# Patient Record
Sex: FEMALE | Race: WHITE | Employment: OTHER | URBAN - METROPOLITAN AREA
[De-identification: names, ages, dates, MRNs, and addresses within clinical notes are randomized per-mention and may not be internally consistent; named-entity substitution may affect disease eponyms.]

---

## 2017-01-06 RX ORDER — CARBAMAZEPINE 100 MG/1
200 TABLET, EXTENDED RELEASE ORAL 2 TIMES DAILY
Qty: 135 TABLET | OUTPATIENT
Start: 2017-01-06

## 2017-01-12 ENCOUNTER — NURSE ONLY (OUTPATIENT)
Dept: FAMILY MEDICINE CLINIC | Age: 82
End: 2017-01-12

## 2017-01-12 ENCOUNTER — HOSPITAL ENCOUNTER (OUTPATIENT)
Dept: NON INVASIVE DIAGNOSTICS | Age: 82
Discharge: HOME | End: 2017-01-12
Attending: PSYCHIATRY & NEUROLOGY | Admitting: PSYCHIATRY & NEUROLOGY

## 2017-01-12 DIAGNOSIS — E53.8 B12 DEFICIENCY: Primary | ICD-10-CM

## 2017-01-12 LAB
CARBAMAZEPINE DOSE: NORMAL
CARBAMAZEPINE LEVEL: 6.8 UG/ML (ref 4–12)

## 2017-01-12 PROCEDURE — 96372 THER/PROPH/DIAG INJ SC/IM: CPT | Performed by: FAMILY MEDICINE

## 2017-01-12 RX ORDER — CYANOCOBALAMIN 1000 UG/ML
1000 INJECTION INTRAMUSCULAR; SUBCUTANEOUS ONCE
Status: COMPLETED | OUTPATIENT
Start: 2017-01-12 | End: 2017-01-12

## 2017-01-12 RX ADMIN — CYANOCOBALAMIN 1000 MCG: 1000 INJECTION INTRAMUSCULAR; SUBCUTANEOUS at 08:47

## 2017-01-14 RX ORDER — CARBAMAZEPINE 100 MG/1
300 TABLET, EXTENDED RELEASE ORAL 3 TIMES DAILY
Qty: 135 TABLET | OUTPATIENT
Start: 2017-01-14

## 2017-01-27 ENCOUNTER — TELEPHONE (OUTPATIENT)
Dept: FAMILY MEDICINE CLINIC | Age: 82
End: 2017-01-27

## 2017-01-27 ENCOUNTER — OFFICE VISIT (OUTPATIENT)
Dept: PAIN MANAGEMENT | Age: 82
End: 2017-01-27

## 2017-01-27 VITALS
WEIGHT: 200 LBS | HEART RATE: 65 BPM | SYSTOLIC BLOOD PRESSURE: 164 MMHG | BODY MASS INDEX: 32.14 KG/M2 | DIASTOLIC BLOOD PRESSURE: 72 MMHG

## 2017-01-27 DIAGNOSIS — M54.12 CERVICAL RADICULITIS: ICD-10-CM

## 2017-01-27 DIAGNOSIS — G89.4 CHRONIC PAIN SYNDROME: ICD-10-CM

## 2017-01-27 DIAGNOSIS — M47.812 CERVICAL SPONDYLOSIS WITHOUT MYELOPATHY: ICD-10-CM

## 2017-01-27 DIAGNOSIS — M50.30 DEGENERATION OF CERVICAL INTERVERTEBRAL DISC: ICD-10-CM

## 2017-01-27 DIAGNOSIS — M54.30 SCIATICA, UNSPECIFIED LATERALITY: ICD-10-CM

## 2017-01-27 PROCEDURE — 4040F PNEUMOC VAC/ADMIN/RCVD: CPT | Performed by: INTERNAL MEDICINE

## 2017-01-27 PROCEDURE — 1123F ACP DISCUSS/DSCN MKR DOCD: CPT | Performed by: INTERNAL MEDICINE

## 2017-01-27 PROCEDURE — G8484 FLU IMMUNIZE NO ADMIN: HCPCS | Performed by: INTERNAL MEDICINE

## 2017-01-27 PROCEDURE — 1036F TOBACCO NON-USER: CPT | Performed by: INTERNAL MEDICINE

## 2017-01-27 PROCEDURE — G8419 CALC BMI OUT NRM PARAM NOF/U: HCPCS | Performed by: INTERNAL MEDICINE

## 2017-01-27 PROCEDURE — 99213 OFFICE O/P EST LOW 20 MIN: CPT | Performed by: INTERNAL MEDICINE

## 2017-01-27 PROCEDURE — G8427 DOCREV CUR MEDS BY ELIG CLIN: HCPCS | Performed by: INTERNAL MEDICINE

## 2017-01-27 PROCEDURE — 1090F PRES/ABSN URINE INCON ASSESS: CPT | Performed by: INTERNAL MEDICINE

## 2017-01-27 PROCEDURE — G8399 PT W/DXA RESULTS DOCUMENT: HCPCS | Performed by: INTERNAL MEDICINE

## 2017-01-27 PROCEDURE — G8598 ASA/ANTIPLAT THER USED: HCPCS | Performed by: INTERNAL MEDICINE

## 2017-01-27 RX ORDER — PREGABALIN 50 MG/1
CAPSULE ORAL
Qty: 270 CAPSULE | Refills: 0 | Status: SHIPPED | OUTPATIENT
Start: 2017-01-27 | End: 2017-05-03 | Stop reason: SDUPTHER

## 2017-01-27 RX ORDER — OXYCODONE AND ACETAMINOPHEN 7.5; 325 MG/1; MG/1
1 TABLET ORAL EVERY 6 HOURS PRN
Qty: 90 TABLET | Refills: 0 | Status: SHIPPED | OUTPATIENT
Start: 2017-01-27 | End: 2017-02-14 | Stop reason: SDUPTHER

## 2017-02-14 ENCOUNTER — OFFICE VISIT (OUTPATIENT)
Dept: FAMILY MEDICINE CLINIC | Age: 82
End: 2017-02-14

## 2017-02-14 VITALS
DIASTOLIC BLOOD PRESSURE: 70 MMHG | WEIGHT: 198.4 LBS | TEMPERATURE: 98 F | HEIGHT: 66 IN | BODY MASS INDEX: 31.88 KG/M2 | SYSTOLIC BLOOD PRESSURE: 134 MMHG

## 2017-02-14 DIAGNOSIS — E53.8 B12 DEFICIENCY: ICD-10-CM

## 2017-02-14 DIAGNOSIS — F41.9 ANXIETY: Primary | ICD-10-CM

## 2017-02-14 DIAGNOSIS — L65.9 HAIR LOSS: ICD-10-CM

## 2017-02-14 LAB — TSH REFLEX: 2.07 UIU/ML (ref 0.27–4.2)

## 2017-02-14 PROCEDURE — 4040F PNEUMOC VAC/ADMIN/RCVD: CPT | Performed by: FAMILY MEDICINE

## 2017-02-14 PROCEDURE — G8484 FLU IMMUNIZE NO ADMIN: HCPCS | Performed by: FAMILY MEDICINE

## 2017-02-14 PROCEDURE — 96372 THER/PROPH/DIAG INJ SC/IM: CPT | Performed by: FAMILY MEDICINE

## 2017-02-14 PROCEDURE — 99214 OFFICE O/P EST MOD 30 MIN: CPT | Performed by: FAMILY MEDICINE

## 2017-02-14 PROCEDURE — G8598 ASA/ANTIPLAT THER USED: HCPCS | Performed by: FAMILY MEDICINE

## 2017-02-14 PROCEDURE — 1123F ACP DISCUSS/DSCN MKR DOCD: CPT | Performed by: FAMILY MEDICINE

## 2017-02-14 PROCEDURE — G8399 PT W/DXA RESULTS DOCUMENT: HCPCS | Performed by: FAMILY MEDICINE

## 2017-02-14 PROCEDURE — 1090F PRES/ABSN URINE INCON ASSESS: CPT | Performed by: FAMILY MEDICINE

## 2017-02-14 PROCEDURE — G8417 CALC BMI ABV UP PARAM F/U: HCPCS | Performed by: FAMILY MEDICINE

## 2017-02-14 PROCEDURE — G8428 CUR MEDS NOT DOCUMENT: HCPCS | Performed by: FAMILY MEDICINE

## 2017-02-14 PROCEDURE — 1036F TOBACCO NON-USER: CPT | Performed by: FAMILY MEDICINE

## 2017-02-14 RX ORDER — ALPRAZOLAM 1 MG/1
TABLET ORAL
Qty: 60 TABLET | Refills: 0 | Status: SHIPPED | OUTPATIENT
Start: 2017-02-14 | End: 2017-02-14 | Stop reason: SDUPTHER

## 2017-02-14 RX ORDER — CYANOCOBALAMIN 1000 UG/ML
1000 INJECTION INTRAMUSCULAR; SUBCUTANEOUS ONCE
Status: COMPLETED | OUTPATIENT
Start: 2017-02-14 | End: 2017-02-14

## 2017-02-14 RX ORDER — ALPRAZOLAM 1 MG/1
TABLET ORAL
Qty: 60 TABLET | Refills: 0 | Status: SHIPPED | OUTPATIENT
Start: 2017-02-14 | End: 2017-05-18 | Stop reason: SDUPTHER

## 2017-02-14 RX ADMIN — CYANOCOBALAMIN 1000 MCG: 1000 INJECTION INTRAMUSCULAR; SUBCUTANEOUS at 10:48

## 2017-02-15 ENCOUNTER — TELEPHONE (OUTPATIENT)
Dept: FAMILY MEDICINE CLINIC | Age: 82
End: 2017-02-15

## 2017-02-20 ASSESSMENT — ENCOUNTER SYMPTOMS
COLOR CHANGE: 0
ABDOMINAL PAIN: 0
SHORTNESS OF BREATH: 0

## 2017-03-14 DIAGNOSIS — F41.9 ANXIETY: ICD-10-CM

## 2017-03-14 RX ORDER — ALPRAZOLAM 1 MG/1
TABLET ORAL
Qty: 60 TABLET | Refills: 0 | OUTPATIENT
Start: 2017-03-14

## 2017-03-16 ENCOUNTER — NURSE ONLY (OUTPATIENT)
Dept: FAMILY MEDICINE CLINIC | Age: 82
End: 2017-03-16

## 2017-03-16 DIAGNOSIS — E53.8 B12 DEFICIENCY: Primary | ICD-10-CM

## 2017-03-16 PROCEDURE — 96372 THER/PROPH/DIAG INJ SC/IM: CPT | Performed by: FAMILY MEDICINE

## 2017-03-16 RX ORDER — CYANOCOBALAMIN 1000 UG/ML
1000 INJECTION INTRAMUSCULAR; SUBCUTANEOUS ONCE
Status: COMPLETED | OUTPATIENT
Start: 2017-03-16 | End: 2017-03-16

## 2017-03-16 RX ADMIN — CYANOCOBALAMIN 1000 MCG: 1000 INJECTION INTRAMUSCULAR; SUBCUTANEOUS at 09:53

## 2017-04-13 ENCOUNTER — NURSE ONLY (OUTPATIENT)
Dept: FAMILY MEDICINE CLINIC | Age: 82
End: 2017-04-13

## 2017-04-13 DIAGNOSIS — E53.8 VITAMIN B 12 DEFICIENCY: Primary | ICD-10-CM

## 2017-04-13 PROCEDURE — 96372 THER/PROPH/DIAG INJ SC/IM: CPT | Performed by: FAMILY MEDICINE

## 2017-04-13 RX ORDER — CYANOCOBALAMIN 1000 UG/ML
1000 INJECTION INTRAMUSCULAR; SUBCUTANEOUS ONCE
Status: COMPLETED | OUTPATIENT
Start: 2017-04-13 | End: 2017-04-13

## 2017-04-13 RX ADMIN — CYANOCOBALAMIN 1000 MCG: 1000 INJECTION INTRAMUSCULAR; SUBCUTANEOUS at 11:50

## 2017-05-03 ENCOUNTER — OFFICE VISIT (OUTPATIENT)
Dept: PAIN MANAGEMENT | Age: 82
End: 2017-05-03

## 2017-05-03 VITALS
HEART RATE: 77 BPM | BODY MASS INDEX: 31.66 KG/M2 | DIASTOLIC BLOOD PRESSURE: 50 MMHG | SYSTOLIC BLOOD PRESSURE: 120 MMHG | WEIGHT: 197 LBS

## 2017-05-03 DIAGNOSIS — M47.812 CERVICAL SPONDYLOSIS WITHOUT MYELOPATHY: ICD-10-CM

## 2017-05-03 DIAGNOSIS — M54.30 SCIATICA, UNSPECIFIED LATERALITY: ICD-10-CM

## 2017-05-03 DIAGNOSIS — M54.12 CERVICAL RADICULITIS: ICD-10-CM

## 2017-05-03 DIAGNOSIS — M50.30 DEGENERATION OF CERVICAL INTERVERTEBRAL DISC: ICD-10-CM

## 2017-05-03 DIAGNOSIS — G89.4 CHRONIC PAIN SYNDROME: ICD-10-CM

## 2017-05-03 PROCEDURE — G8427 DOCREV CUR MEDS BY ELIG CLIN: HCPCS | Performed by: INTERNAL MEDICINE

## 2017-05-03 PROCEDURE — G8417 CALC BMI ABV UP PARAM F/U: HCPCS | Performed by: INTERNAL MEDICINE

## 2017-05-03 PROCEDURE — G8598 ASA/ANTIPLAT THER USED: HCPCS | Performed by: INTERNAL MEDICINE

## 2017-05-03 PROCEDURE — G8399 PT W/DXA RESULTS DOCUMENT: HCPCS | Performed by: INTERNAL MEDICINE

## 2017-05-03 PROCEDURE — 4040F PNEUMOC VAC/ADMIN/RCVD: CPT | Performed by: INTERNAL MEDICINE

## 2017-05-03 PROCEDURE — 1090F PRES/ABSN URINE INCON ASSESS: CPT | Performed by: INTERNAL MEDICINE

## 2017-05-03 PROCEDURE — 99213 OFFICE O/P EST LOW 20 MIN: CPT | Performed by: INTERNAL MEDICINE

## 2017-05-03 PROCEDURE — 1036F TOBACCO NON-USER: CPT | Performed by: INTERNAL MEDICINE

## 2017-05-03 PROCEDURE — 1123F ACP DISCUSS/DSCN MKR DOCD: CPT | Performed by: INTERNAL MEDICINE

## 2017-05-03 RX ORDER — PREGABALIN 50 MG/1
CAPSULE ORAL
Qty: 270 CAPSULE | Refills: 0 | Status: SHIPPED | OUTPATIENT
Start: 2017-05-03 | End: 2017-08-09 | Stop reason: SDUPTHER

## 2017-05-03 RX ORDER — OXYCODONE AND ACETAMINOPHEN 7.5; 325 MG/1; MG/1
1 TABLET ORAL EVERY 6 HOURS PRN
Qty: 90 TABLET | Refills: 0 | Status: SHIPPED | OUTPATIENT
Start: 2017-05-03 | End: 2017-08-09 | Stop reason: SDUPTHER

## 2017-05-18 ENCOUNTER — OFFICE VISIT (OUTPATIENT)
Dept: FAMILY MEDICINE CLINIC | Age: 82
End: 2017-05-18

## 2017-05-18 VITALS
WEIGHT: 193 LBS | BODY MASS INDEX: 31.02 KG/M2 | SYSTOLIC BLOOD PRESSURE: 100 MMHG | DIASTOLIC BLOOD PRESSURE: 70 MMHG | TEMPERATURE: 99.2 F

## 2017-05-18 DIAGNOSIS — F41.9 ANXIETY: Primary | ICD-10-CM

## 2017-05-18 DIAGNOSIS — E53.8 B12 DEFICIENCY: ICD-10-CM

## 2017-05-18 PROCEDURE — 1036F TOBACCO NON-USER: CPT | Performed by: FAMILY MEDICINE

## 2017-05-18 PROCEDURE — 4040F PNEUMOC VAC/ADMIN/RCVD: CPT | Performed by: FAMILY MEDICINE

## 2017-05-18 PROCEDURE — 99213 OFFICE O/P EST LOW 20 MIN: CPT | Performed by: FAMILY MEDICINE

## 2017-05-18 PROCEDURE — G8417 CALC BMI ABV UP PARAM F/U: HCPCS | Performed by: FAMILY MEDICINE

## 2017-05-18 PROCEDURE — 1090F PRES/ABSN URINE INCON ASSESS: CPT | Performed by: FAMILY MEDICINE

## 2017-05-18 PROCEDURE — 1123F ACP DISCUSS/DSCN MKR DOCD: CPT | Performed by: FAMILY MEDICINE

## 2017-05-18 PROCEDURE — G8399 PT W/DXA RESULTS DOCUMENT: HCPCS | Performed by: FAMILY MEDICINE

## 2017-05-18 PROCEDURE — 96372 THER/PROPH/DIAG INJ SC/IM: CPT | Performed by: FAMILY MEDICINE

## 2017-05-18 PROCEDURE — G8598 ASA/ANTIPLAT THER USED: HCPCS | Performed by: FAMILY MEDICINE

## 2017-05-18 PROCEDURE — G8427 DOCREV CUR MEDS BY ELIG CLIN: HCPCS | Performed by: FAMILY MEDICINE

## 2017-05-18 RX ORDER — ALPRAZOLAM 1 MG/1
TABLET ORAL
Qty: 60 TABLET | Refills: 0 | Status: SHIPPED | OUTPATIENT
Start: 2017-05-18 | End: 2017-05-18 | Stop reason: SDUPTHER

## 2017-05-18 RX ORDER — CYANOCOBALAMIN 1000 UG/ML
1000 INJECTION INTRAMUSCULAR; SUBCUTANEOUS ONCE
Status: COMPLETED | OUTPATIENT
Start: 2017-05-18 | End: 2017-05-18

## 2017-05-18 RX ORDER — ALPRAZOLAM 1 MG/1
TABLET ORAL
Qty: 60 TABLET | Refills: 0 | Status: SHIPPED | OUTPATIENT
Start: 2017-05-18 | End: 2017-08-17 | Stop reason: SDUPTHER

## 2017-05-18 RX ADMIN — CYANOCOBALAMIN 1000 MCG: 1000 INJECTION INTRAMUSCULAR; SUBCUTANEOUS at 11:18

## 2017-06-14 ASSESSMENT — ENCOUNTER SYMPTOMS
ABDOMINAL PAIN: 0
SHORTNESS OF BREATH: 0

## 2017-06-20 ENCOUNTER — NURSE ONLY (OUTPATIENT)
Dept: FAMILY MEDICINE CLINIC | Age: 82
End: 2017-06-20

## 2017-06-20 DIAGNOSIS — E53.8 VITAMIN B 12 DEFICIENCY: Primary | ICD-10-CM

## 2017-06-20 PROCEDURE — 96372 THER/PROPH/DIAG INJ SC/IM: CPT | Performed by: FAMILY MEDICINE

## 2017-06-20 RX ORDER — CYANOCOBALAMIN 1000 UG/ML
1000 INJECTION INTRAMUSCULAR; SUBCUTANEOUS ONCE
Status: COMPLETED | OUTPATIENT
Start: 2017-06-20 | End: 2017-06-20

## 2017-06-20 RX ADMIN — CYANOCOBALAMIN 1000 MCG: 1000 INJECTION INTRAMUSCULAR; SUBCUTANEOUS at 10:43

## 2017-07-20 ENCOUNTER — NURSE ONLY (OUTPATIENT)
Dept: FAMILY MEDICINE CLINIC | Age: 82
End: 2017-07-20

## 2017-07-20 DIAGNOSIS — E53.8 B12 DEFICIENCY: Primary | ICD-10-CM

## 2017-07-20 PROCEDURE — 96372 THER/PROPH/DIAG INJ SC/IM: CPT | Performed by: FAMILY MEDICINE

## 2017-07-20 RX ORDER — CYANOCOBALAMIN 1000 UG/ML
1000 INJECTION INTRAMUSCULAR; SUBCUTANEOUS ONCE
Status: COMPLETED | OUTPATIENT
Start: 2017-07-20 | End: 2017-07-20

## 2017-07-20 RX ADMIN — CYANOCOBALAMIN 1000 MCG: 1000 INJECTION INTRAMUSCULAR; SUBCUTANEOUS at 10:44

## 2017-07-25 RX ORDER — LEVOTHYROXINE SODIUM 0.1 MG/1
TABLET ORAL
Qty: 90 TABLET | Refills: 0 | Status: SHIPPED | OUTPATIENT
Start: 2017-07-25 | End: 2018-01-27 | Stop reason: SDUPTHER

## 2017-08-09 ENCOUNTER — OFFICE VISIT (OUTPATIENT)
Dept: PAIN MANAGEMENT | Age: 82
End: 2017-08-09

## 2017-08-09 VITALS
DIASTOLIC BLOOD PRESSURE: 61 MMHG | BODY MASS INDEX: 31.34 KG/M2 | HEART RATE: 62 BPM | WEIGHT: 195 LBS | SYSTOLIC BLOOD PRESSURE: 136 MMHG

## 2017-08-09 DIAGNOSIS — M54.12 CERVICAL RADICULITIS: ICD-10-CM

## 2017-08-09 DIAGNOSIS — G89.4 CHRONIC PAIN SYNDROME: ICD-10-CM

## 2017-08-09 DIAGNOSIS — M54.30 SCIATICA, UNSPECIFIED LATERALITY: ICD-10-CM

## 2017-08-09 DIAGNOSIS — M50.30 DEGENERATION OF CERVICAL INTERVERTEBRAL DISC: ICD-10-CM

## 2017-08-09 DIAGNOSIS — M47.812 CERVICAL SPONDYLOSIS WITHOUT MYELOPATHY: ICD-10-CM

## 2017-08-09 PROCEDURE — G8399 PT W/DXA RESULTS DOCUMENT: HCPCS | Performed by: INTERNAL MEDICINE

## 2017-08-09 PROCEDURE — 1123F ACP DISCUSS/DSCN MKR DOCD: CPT | Performed by: INTERNAL MEDICINE

## 2017-08-09 PROCEDURE — 1090F PRES/ABSN URINE INCON ASSESS: CPT | Performed by: INTERNAL MEDICINE

## 2017-08-09 PROCEDURE — G8417 CALC BMI ABV UP PARAM F/U: HCPCS | Performed by: INTERNAL MEDICINE

## 2017-08-09 PROCEDURE — 4040F PNEUMOC VAC/ADMIN/RCVD: CPT | Performed by: INTERNAL MEDICINE

## 2017-08-09 PROCEDURE — 1036F TOBACCO NON-USER: CPT | Performed by: INTERNAL MEDICINE

## 2017-08-09 PROCEDURE — G8598 ASA/ANTIPLAT THER USED: HCPCS | Performed by: INTERNAL MEDICINE

## 2017-08-09 PROCEDURE — G8427 DOCREV CUR MEDS BY ELIG CLIN: HCPCS | Performed by: INTERNAL MEDICINE

## 2017-08-09 PROCEDURE — 99213 OFFICE O/P EST LOW 20 MIN: CPT | Performed by: INTERNAL MEDICINE

## 2017-08-09 RX ORDER — PREGABALIN 50 MG/1
CAPSULE ORAL
Qty: 270 CAPSULE | Refills: 0 | Status: SHIPPED | OUTPATIENT
Start: 2017-08-09 | End: 2017-11-10 | Stop reason: SDUPTHER

## 2017-08-09 RX ORDER — OXYCODONE AND ACETAMINOPHEN 7.5; 325 MG/1; MG/1
1 TABLET ORAL EVERY 6 HOURS PRN
Qty: 90 TABLET | Refills: 0 | Status: SHIPPED | OUTPATIENT
Start: 2017-08-09 | End: 2017-11-10 | Stop reason: SDUPTHER

## 2017-08-17 ENCOUNTER — OFFICE VISIT (OUTPATIENT)
Dept: FAMILY MEDICINE CLINIC | Age: 82
End: 2017-08-17

## 2017-08-17 VITALS
HEIGHT: 66 IN | SYSTOLIC BLOOD PRESSURE: 122 MMHG | TEMPERATURE: 98 F | BODY MASS INDEX: 31.66 KG/M2 | DIASTOLIC BLOOD PRESSURE: 84 MMHG | HEART RATE: 62 BPM | WEIGHT: 197 LBS

## 2017-08-17 DIAGNOSIS — F41.9 ANXIETY: ICD-10-CM

## 2017-08-17 DIAGNOSIS — E53.8 B12 DEFICIENCY: Primary | ICD-10-CM

## 2017-08-17 PROCEDURE — G8598 ASA/ANTIPLAT THER USED: HCPCS | Performed by: FAMILY MEDICINE

## 2017-08-17 PROCEDURE — 96372 THER/PROPH/DIAG INJ SC/IM: CPT | Performed by: FAMILY MEDICINE

## 2017-08-17 PROCEDURE — G8427 DOCREV CUR MEDS BY ELIG CLIN: HCPCS | Performed by: FAMILY MEDICINE

## 2017-08-17 PROCEDURE — 1090F PRES/ABSN URINE INCON ASSESS: CPT | Performed by: FAMILY MEDICINE

## 2017-08-17 PROCEDURE — G8399 PT W/DXA RESULTS DOCUMENT: HCPCS | Performed by: FAMILY MEDICINE

## 2017-08-17 PROCEDURE — G8417 CALC BMI ABV UP PARAM F/U: HCPCS | Performed by: FAMILY MEDICINE

## 2017-08-17 PROCEDURE — 1036F TOBACCO NON-USER: CPT | Performed by: FAMILY MEDICINE

## 2017-08-17 PROCEDURE — 1123F ACP DISCUSS/DSCN MKR DOCD: CPT | Performed by: FAMILY MEDICINE

## 2017-08-17 PROCEDURE — 4040F PNEUMOC VAC/ADMIN/RCVD: CPT | Performed by: FAMILY MEDICINE

## 2017-08-17 PROCEDURE — 99213 OFFICE O/P EST LOW 20 MIN: CPT | Performed by: FAMILY MEDICINE

## 2017-08-17 RX ORDER — ALPRAZOLAM 1 MG/1
TABLET ORAL
Qty: 60 TABLET | Refills: 0 | Status: SHIPPED | OUTPATIENT
Start: 2017-08-17 | End: 2017-08-17 | Stop reason: SDUPTHER

## 2017-08-17 RX ORDER — ALPRAZOLAM 1 MG/1
TABLET ORAL
Qty: 60 TABLET | Refills: 0 | Status: SHIPPED | OUTPATIENT
Start: 2017-08-17 | End: 2017-11-21 | Stop reason: SDUPTHER

## 2017-08-17 RX ORDER — CYANOCOBALAMIN 1000 UG/ML
1000 INJECTION INTRAMUSCULAR; SUBCUTANEOUS ONCE
Status: COMPLETED | OUTPATIENT
Start: 2017-08-17 | End: 2017-08-17

## 2017-08-17 RX ADMIN — CYANOCOBALAMIN 1000 MCG: 1000 INJECTION INTRAMUSCULAR; SUBCUTANEOUS at 11:30

## 2017-08-21 ENCOUNTER — TELEPHONE (OUTPATIENT)
Dept: FAMILY MEDICINE CLINIC | Age: 82
End: 2017-08-21

## 2017-08-21 DIAGNOSIS — M25.561 ACUTE PAIN OF RIGHT KNEE: Primary | ICD-10-CM

## 2017-08-22 ENCOUNTER — HOSPITAL ENCOUNTER (OUTPATIENT)
Dept: NON INVASIVE DIAGNOSTICS | Age: 82
Discharge: HOME | End: 2017-08-22
Attending: FAMILY MEDICINE | Admitting: FAMILY MEDICINE

## 2017-08-22 DIAGNOSIS — M25.561 ACUTE PAIN OF RIGHT KNEE: ICD-10-CM

## 2017-08-23 ENCOUNTER — TELEPHONE (OUTPATIENT)
Dept: FAMILY MEDICINE CLINIC | Age: 82
End: 2017-08-23

## 2017-08-23 NOTE — TELEPHONE ENCOUNTER
Wilber Green is calling for the xray results. Pl advise. [phone]    Wilber Green is aware that  Is out of the office and this message will be addressed when the Dr. Pio Pearsonred back into the office.

## 2017-08-28 ASSESSMENT — ENCOUNTER SYMPTOMS
SHORTNESS OF BREATH: 0
ABDOMINAL PAIN: 0

## 2017-08-29 ENCOUNTER — TELEPHONE (OUTPATIENT)
Dept: FAMILY MEDICINE CLINIC | Age: 82
End: 2017-08-29

## 2017-08-29 NOTE — TELEPHONE ENCOUNTER
Pt is needing a rx from Dr. Femi Galloway for a lift chair. Pt already called her insurance and they will pay for this. Pt is needing to know where she can get one at. Pl advise.    [phone]

## 2017-08-31 NOTE — TELEPHONE ENCOUNTER
A lift chair? I know nothing about ordering this. So, I have more questions:   Do they mean just a chair like a recliner that helps her to stand from a sit? Is there a specific brand of chair that they want? She has trouble standing because of her arthritis? Anything else that prevents her from getting up on her own? Thanks for the additional info.

## 2017-08-31 NOTE — TELEPHONE ENCOUNTER
Spoke with patient she states she wants the chair like a recliner brings her up to the standing position, she does not have any certain brand in mind. She states she has leg weakness, and has trouble pushing herself up.

## 2017-09-05 ENCOUNTER — TELEPHONE (OUTPATIENT)
Dept: FAMILY MEDICINE CLINIC | Age: 82
End: 2017-09-05

## 2017-09-28 ENCOUNTER — NURSE ONLY (OUTPATIENT)
Dept: FAMILY MEDICINE CLINIC | Age: 82
End: 2017-09-28

## 2017-09-28 DIAGNOSIS — Z23 NEED FOR INFLUENZA VACCINATION: Primary | ICD-10-CM

## 2017-09-28 DIAGNOSIS — E53.8 B12 DEFICIENCY: ICD-10-CM

## 2017-09-28 PROCEDURE — 96372 THER/PROPH/DIAG INJ SC/IM: CPT | Performed by: FAMILY MEDICINE

## 2017-09-28 PROCEDURE — 90662 IIV NO PRSV INCREASED AG IM: CPT | Performed by: FAMILY MEDICINE

## 2017-09-28 PROCEDURE — G0008 ADMIN INFLUENZA VIRUS VAC: HCPCS | Performed by: FAMILY MEDICINE

## 2017-09-28 RX ORDER — CYANOCOBALAMIN 1000 UG/ML
1000 INJECTION INTRAMUSCULAR; SUBCUTANEOUS ONCE
Status: COMPLETED | OUTPATIENT
Start: 2017-09-28 | End: 2017-09-28

## 2017-09-28 RX ADMIN — CYANOCOBALAMIN 1000 MCG: 1000 INJECTION INTRAMUSCULAR; SUBCUTANEOUS at 11:23

## 2017-10-10 NOTE — TELEPHONE ENCOUNTER
Call to dtjuan Hong at [phone] (cell). Adv recliner lift chair rx ready for . She requested we fax to her work office at [phone]. Fax completed.

## 2017-10-26 ENCOUNTER — NURSE ONLY (OUTPATIENT)
Dept: FAMILY MEDICINE CLINIC | Age: 82
End: 2017-10-26

## 2017-10-26 DIAGNOSIS — E53.8 VITAMIN B 12 DEFICIENCY: Primary | ICD-10-CM

## 2017-10-26 PROCEDURE — 96372 THER/PROPH/DIAG INJ SC/IM: CPT | Performed by: FAMILY MEDICINE

## 2017-10-26 RX ORDER — CYANOCOBALAMIN 1000 UG/ML
1000 INJECTION INTRAMUSCULAR; SUBCUTANEOUS ONCE
Status: COMPLETED | OUTPATIENT
Start: 2017-10-26 | End: 2017-10-26

## 2017-10-26 RX ADMIN — CYANOCOBALAMIN 1000 MCG: 1000 INJECTION INTRAMUSCULAR; SUBCUTANEOUS at 10:13

## 2017-11-10 ENCOUNTER — OFFICE VISIT (OUTPATIENT)
Dept: PAIN MANAGEMENT | Age: 82
End: 2017-11-10

## 2017-11-10 VITALS
DIASTOLIC BLOOD PRESSURE: 65 MMHG | BODY MASS INDEX: 31.8 KG/M2 | SYSTOLIC BLOOD PRESSURE: 148 MMHG | HEART RATE: 50 BPM | WEIGHT: 197 LBS

## 2017-11-10 DIAGNOSIS — M54.12 CERVICAL RADICULITIS: ICD-10-CM

## 2017-11-10 DIAGNOSIS — M47.812 CERVICAL SPONDYLOSIS WITHOUT MYELOPATHY: ICD-10-CM

## 2017-11-10 DIAGNOSIS — G89.4 CHRONIC PAIN SYNDROME: ICD-10-CM

## 2017-11-10 DIAGNOSIS — M54.30 SCIATICA, UNSPECIFIED LATERALITY: ICD-10-CM

## 2017-11-10 DIAGNOSIS — M50.30 DEGENERATION OF CERVICAL INTERVERTEBRAL DISC: ICD-10-CM

## 2017-11-10 DIAGNOSIS — M17.11 PRIMARY OSTEOARTHRITIS OF RIGHT KNEE: ICD-10-CM

## 2017-11-10 PROCEDURE — 1123F ACP DISCUSS/DSCN MKR DOCD: CPT | Performed by: INTERNAL MEDICINE

## 2017-11-10 PROCEDURE — G8484 FLU IMMUNIZE NO ADMIN: HCPCS | Performed by: INTERNAL MEDICINE

## 2017-11-10 PROCEDURE — 20610 DRAIN/INJ JOINT/BURSA W/O US: CPT | Performed by: INTERNAL MEDICINE

## 2017-11-10 PROCEDURE — G8417 CALC BMI ABV UP PARAM F/U: HCPCS | Performed by: INTERNAL MEDICINE

## 2017-11-10 PROCEDURE — G8598 ASA/ANTIPLAT THER USED: HCPCS | Performed by: INTERNAL MEDICINE

## 2017-11-10 PROCEDURE — G8399 PT W/DXA RESULTS DOCUMENT: HCPCS | Performed by: INTERNAL MEDICINE

## 2017-11-10 PROCEDURE — 4040F PNEUMOC VAC/ADMIN/RCVD: CPT | Performed by: INTERNAL MEDICINE

## 2017-11-10 PROCEDURE — 1090F PRES/ABSN URINE INCON ASSESS: CPT | Performed by: INTERNAL MEDICINE

## 2017-11-10 PROCEDURE — 99213 OFFICE O/P EST LOW 20 MIN: CPT | Performed by: INTERNAL MEDICINE

## 2017-11-10 PROCEDURE — 1036F TOBACCO NON-USER: CPT | Performed by: INTERNAL MEDICINE

## 2017-11-10 PROCEDURE — G8427 DOCREV CUR MEDS BY ELIG CLIN: HCPCS | Performed by: INTERNAL MEDICINE

## 2017-11-10 RX ORDER — PREGABALIN 50 MG/1
CAPSULE ORAL
Qty: 270 CAPSULE | Refills: 0 | Status: SHIPPED | OUTPATIENT
Start: 2017-11-10 | End: 2018-01-10 | Stop reason: SDUPTHER

## 2017-11-10 RX ORDER — OXYCODONE AND ACETAMINOPHEN 7.5; 325 MG/1; MG/1
1 TABLET ORAL EVERY 6 HOURS PRN
Qty: 90 TABLET | Refills: 0 | Status: SHIPPED | OUTPATIENT
Start: 2017-11-10 | End: 2018-01-10 | Stop reason: SDUPTHER

## 2017-11-10 RX ORDER — TRIAMCINOLONE ACETONIDE 40 MG/ML
40 INJECTION, SUSPENSION INTRA-ARTICULAR; INTRAMUSCULAR ONCE
Status: SHIPPED | OUTPATIENT
Start: 2017-11-10

## 2017-11-10 NOTE — PROGRESS NOTES
Subjective:      Patient ID: Shantanu Chris is a 81 y.o. female.     HPI    Review of Systems    Objective:   Physical Exam    Assessment:      ***      Plan:      ***

## 2017-11-10 NOTE — PROGRESS NOTES
[facility]  1935  [id number]    HISTORY OF PRESENT ILLNESS:  Ms. Sapna Gil is a 81 y.o. female returns for a follow up visit for multiple medical problems. Her current presenting problems are   1. Chronic pain syndrome    2. Cervical spondylosis without myelopathy    3. Degeneration of cervical intervertebral disc    4. Cervical radiculitis    5. Primary osteoarthritis of right knee    . As per information/history obtained from the PADT(patient assessment and documentation tool) - She complains of pain in the neck with radiation to the shoulders Bilateral and knees Right She rates the pain 7/10 and describes it as sharp, aching. Pain is made worse by: walking. Current treatment regimen has helped relieve about 50% of the pain. She denies side effects from the current pain regimen. Patient reports that since the last follow up visit the physical functioning is worse, family/social relationships are unchanged, mood is unchanged and sleep patterns are unchanged, and that the overall functioning is worse. Patient denies neurological bowel or bladder. Patient denies misusing/abusing her narcotic pain medications or using any illegal drugs. There are No indicators for possible drug abuse, addiction or diversion problems. Upon obtaining the medical history from Ms. Sapna Gil regarding today's office visit for her presenting problems, Patient states she has not been able to walk because of the right knee hurting. She states she cannot bear weight, has been using a walker for ambulation. She says she has been hurting in the knee. Ms. Sapna Gil mentions she has tried the topical NSAID'S, which has not been helping. Patient states her sleep is fair. Has fairly normal sleep latency. Averages about 4-6 hours of sleep a night. Denies any signs of sleep apnea. Feels somewhat rested in the morning.        ALLERGIES: Patients list of allergies were reviewed     MEDICATIONS: Ms. Sapna Gil list of medications were reviewed. Her current medications are   Outpatient Medications Prior to Visit   Medication Sig Dispense Refill    ALPRAZolam (XANAX) 1 MG tablet TAKE ONE TABLET BY MOUTH TWICE A DAY AS NEEDED FOR ANXIETY. NO ACOHOL. NO DRIVING. Do not take with percocet. 60 tablet 0    oxyCODONE-acetaminophen (PERCOCET) 7.5-325 MG per tablet Take 1 tablet by mouth every 6 hours as needed for Pain  Max 1-2 PER DAY. 90 tablet 0    pregabalin (LYRICA) 50 MG capsule 1 tab AM 2 tabs PM. 270 capsule 0    levothyroxine (SYNTHROID) 100 MCG tablet TAKE ONE TABLET BY MOUTH DAILY 90 tablet 0    carBAMazepine (TEGRETOL-XR) 200 MG extended release tablet Take 200 mg by mouth 3 times daily Indications: taking 800 mg daily, 300mg morning, 200mg afternoon, 300mg at night      atorvastatin (LIPITOR) 80 MG tablet Take 80 mg by mouth daily      amLODIPine (NORVASC) 5 MG tablet Take 5 mg by mouth daily      DULoxetine (CYMBALTA) 20 MG extended release capsule Take 20 mg by mouth daily       carBAMazepine (TEGRETOL-XR) 100 MG extended release tablet Take 1 tablet by mouth 2 times daily (Patient taking differently: Take 300 mg by mouth 3 times daily Indications: Taking 800 mg daily, 300mg, 200mg, 300mg ) 100 tablet 3    allopurinol (ZYLOPRIM) 300 MG tablet Take 300 mg by mouth      Respiratory Therapy Supplies (NEBULIZER COMPRESSOR) KIT 1 kit by Does not apply route once      Roflumilast (DALIRESP) 500 MCG tablet Take 500 mcg by mouth daily      theophylline (PILAR-24) 200 MG SR capsule Take 1 capsule by mouth daily 30 capsule 3    isosorbide mononitrate (IMDUR) 30 MG CR tablet Take 1 tablet by mouth daily. (Patient taking differently: Take 30 mg by mouth 2 times daily.) 90 tablet 0    nitroGLYCERIN (NITROSTAT) 0.4 MG SL tablet 1 SL q 5 min prn chest pain. After 3 tabs, if pain persists, go to ER.  25 tablet 0    azithromycin (ZITHROMAX) 500 MG tablet Take 500 mg by mouth. m-w-f      pantoprazole (PROTONIX) 40 MG tablet Take 40 mg by mouth daily.      metoprolol (LOPRESSOR) 25 MG tablet Take 50 mg by mouth 2 times daily.  PREDNISONE Take 10 mg by mouth daily at 1800       tiotropium (SPIRIVA) 18 MCG inhalation capsule Inhale 18 mcg into the lungs daily.  montelukast (SINGULAIR) 10 MG tablet Take 10 mg by mouth nightly.  psyllium (METAMUCIL SMOOTH TEXTURE) 28 % packet Take 1 packet by mouth 2 times daily. Take with full glass of h20 or juice       albuterol (PROVENTIL HFA;VENTOLIN HFA) 108 (90 BASE) MCG/ACT inhaler Inhale 2 puffs into the lungs every 6 hours as needed.  Potassium Chloride (KLOR-CON PO) Take 20 mEq by mouth daily       fluticasone-salmeterol (ADVAIR DISKUS) 250-50 MCG/DOSE AEPB Inhale 1 puff into the lungs 2 times daily.  clopidogrel (PLAVIX) 75 MG tablet Take 75 mg by mouth daily.  aspirin 81 MG EC tablet Take 81 mg by mouth daily. No facility-administered medications prior to visit. SOCIAL/FAMILY/PAST MEDICAL HISTORY: Ms. Smith Self, family and past medical history was reviewed. REVIEW OF SYSTEMS:    Respiratory: Negative for apnea, chest tightness and shortness of breath or change in baseline breathing. Gastrointestinal: Negative for nausea, vomiting, abdominal pain, diarrhea, constipation, blood in stool and abdominal distention. PHYSICAL EXAM:   Nursing note and vitals reviewed. BP (!) 148/65 (Site: Right Arm, Position: Sitting)   Pulse 50   Wt 197 lb (89.4 kg)   Breastfeeding? No   BMI 31.80 kg/m²   Constitutional: She appears well-developed and well-nourished. No acute distress. Skin: Skin is warm and dry, good turgor. No rash noted. She is not diaphoretic. Cardiovascular: Normal rate, regular rhythm, normal heart sounds, and does not have murmur. Pulmonary/Chest: Effort normal. No respiratory distress. She does not have wheezes in the lung fields. She has no rales. Neurological/Psychiatric:She is alert and oriented to person, place, and time. and in the presence of Dr. Eric Pavon.    11/10/17  4:24 PM  Irma Chappell MA   I, Dr. Eric Pavon, personally performed the services described in this documentation as scribed by   Tod Graham MA in my presence and it is both accurate and complete

## 2017-11-21 ENCOUNTER — OFFICE VISIT (OUTPATIENT)
Dept: FAMILY MEDICINE CLINIC | Age: 82
End: 2017-11-21

## 2017-11-21 VITALS
HEIGHT: 66 IN | SYSTOLIC BLOOD PRESSURE: 118 MMHG | DIASTOLIC BLOOD PRESSURE: 55 MMHG | BODY MASS INDEX: 30.89 KG/M2 | WEIGHT: 192.2 LBS | HEART RATE: 76 BPM | TEMPERATURE: 97.4 F

## 2017-11-21 DIAGNOSIS — E03.9 ACQUIRED HYPOTHYROIDISM: ICD-10-CM

## 2017-11-21 DIAGNOSIS — E03.9 ACQUIRED HYPOTHYROIDISM: Primary | ICD-10-CM

## 2017-11-21 DIAGNOSIS — M54.12 CERVICAL RADICULITIS: ICD-10-CM

## 2017-11-21 DIAGNOSIS — Z51.81 ENCOUNTER FOR THERAPEUTIC DRUG MONITORING: ICD-10-CM

## 2017-11-21 DIAGNOSIS — I63.9 CEREBROVASCULAR ACCIDENT (CVA), UNSPECIFIED MECHANISM (HCC): ICD-10-CM

## 2017-11-21 DIAGNOSIS — M10.472 GOUT DUE TO OTHER SECONDARY CAUSE INVOLVING TOE OF LEFT FOOT, UNSPECIFIED CHRONICITY: ICD-10-CM

## 2017-11-21 DIAGNOSIS — Z13.1 DIABETES MELLITUS SCREENING: ICD-10-CM

## 2017-11-21 DIAGNOSIS — E78.1 HYPERTRIGLYCERIDEMIA: ICD-10-CM

## 2017-11-21 DIAGNOSIS — M81.0 AGE-RELATED OSTEOPOROSIS WITHOUT CURRENT PATHOLOGICAL FRACTURE: ICD-10-CM

## 2017-11-21 DIAGNOSIS — F41.9 ANXIETY: ICD-10-CM

## 2017-11-21 LAB
A/G RATIO: 1.7 (ref 1.1–2.2)
ALBUMIN SERPL-MCNC: 3.9 G/DL (ref 3.4–5)
ALP BLD-CCNC: 156 U/L (ref 40–129)
ALT SERPL-CCNC: 13 U/L (ref 10–40)
ANION GAP SERPL CALCULATED.3IONS-SCNC: 15 MMOL/L (ref 3–16)
AST SERPL-CCNC: 15 U/L (ref 15–37)
BASOPHILS ABSOLUTE: 0 K/UL (ref 0–0.2)
BASOPHILS RELATIVE PERCENT: 0.3 %
BILIRUB SERPL-MCNC: 0.3 MG/DL (ref 0–1)
BUN BLDV-MCNC: 13 MG/DL (ref 7–20)
CALCIUM SERPL-MCNC: 8.4 MG/DL (ref 8.3–10.6)
CHLORIDE BLD-SCNC: 99 MMOL/L (ref 99–110)
CHOLESTEROL, TOTAL: 188 MG/DL (ref 0–199)
CO2: 30 MMOL/L (ref 21–32)
CREAT SERPL-MCNC: 0.9 MG/DL (ref 0.6–1.2)
EOSINOPHILS ABSOLUTE: 0.2 K/UL (ref 0–0.6)
EOSINOPHILS RELATIVE PERCENT: 2.1 %
FOLATE: 3.48 NG/ML (ref 4.78–24.2)
GFR AFRICAN AMERICAN: >60
GFR NON-AFRICAN AMERICAN: 60
GLOBULIN: 2.3 G/DL
GLUCOSE BLD-MCNC: 121 MG/DL (ref 70–99)
HCT VFR BLD CALC: 38.5 % (ref 36–48)
HDLC SERPL-MCNC: 46 MG/DL (ref 40–60)
HEMOGLOBIN: 12.9 G/DL (ref 12–16)
LDL CHOLESTEROL CALCULATED: ABNORMAL MG/DL
LDL CHOLESTEROL DIRECT: 60 MG/DL
LYMPHOCYTES ABSOLUTE: 1.9 K/UL (ref 1–5.1)
LYMPHOCYTES RELATIVE PERCENT: 19.6 %
MCH RBC QN AUTO: 33.8 PG (ref 26–34)
MCHC RBC AUTO-ENTMCNC: 33.5 G/DL (ref 31–36)
MCV RBC AUTO: 100.8 FL (ref 80–100)
MONOCYTES ABSOLUTE: 0.8 K/UL (ref 0–1.3)
MONOCYTES RELATIVE PERCENT: 8.5 %
NEUTROPHILS ABSOLUTE: 6.6 K/UL (ref 1.7–7.7)
NEUTROPHILS RELATIVE PERCENT: 69.5 %
PDW BLD-RTO: 13.3 % (ref 12.4–15.4)
PLATELET # BLD: 210 K/UL (ref 135–450)
PMV BLD AUTO: 9.6 FL (ref 5–10.5)
POTASSIUM SERPL-SCNC: 3.5 MMOL/L (ref 3.5–5.1)
RBC # BLD: 3.82 M/UL (ref 4–5.2)
SODIUM BLD-SCNC: 144 MMOL/L (ref 136–145)
TOTAL PROTEIN: 6.2 G/DL (ref 6.4–8.2)
TRIGL SERPL-MCNC: 472 MG/DL (ref 0–150)
TSH REFLEX: 1.51 UIU/ML (ref 0.27–4.2)
URIC ACID, SERUM: 3.5 MG/DL (ref 2.6–6)
VITAMIN B-12: >2000 PG/ML (ref 211–911)
VITAMIN D 25-HYDROXY: 11.8 NG/ML
VLDLC SERPL CALC-MCNC: ABNORMAL MG/DL
WBC # BLD: 9.6 K/UL (ref 4–11)

## 2017-11-21 PROCEDURE — G8484 FLU IMMUNIZE NO ADMIN: HCPCS | Performed by: FAMILY MEDICINE

## 2017-11-21 PROCEDURE — 1090F PRES/ABSN URINE INCON ASSESS: CPT | Performed by: FAMILY MEDICINE

## 2017-11-21 PROCEDURE — 1036F TOBACCO NON-USER: CPT | Performed by: FAMILY MEDICINE

## 2017-11-21 PROCEDURE — G8399 PT W/DXA RESULTS DOCUMENT: HCPCS | Performed by: FAMILY MEDICINE

## 2017-11-21 PROCEDURE — G8598 ASA/ANTIPLAT THER USED: HCPCS | Performed by: FAMILY MEDICINE

## 2017-11-21 PROCEDURE — 4040F PNEUMOC VAC/ADMIN/RCVD: CPT | Performed by: FAMILY MEDICINE

## 2017-11-21 PROCEDURE — 1123F ACP DISCUSS/DSCN MKR DOCD: CPT | Performed by: FAMILY MEDICINE

## 2017-11-21 PROCEDURE — G8427 DOCREV CUR MEDS BY ELIG CLIN: HCPCS | Performed by: FAMILY MEDICINE

## 2017-11-21 PROCEDURE — 99213 OFFICE O/P EST LOW 20 MIN: CPT | Performed by: FAMILY MEDICINE

## 2017-11-21 PROCEDURE — G8417 CALC BMI ABV UP PARAM F/U: HCPCS | Performed by: FAMILY MEDICINE

## 2017-11-21 PROCEDURE — 4005F PHARM THX FOR OP RXD: CPT | Performed by: FAMILY MEDICINE

## 2017-11-21 RX ORDER — ALPRAZOLAM 1 MG/1
TABLET ORAL
Qty: 60 TABLET | Refills: 0 | Status: SHIPPED | OUTPATIENT
Start: 2017-11-21 | End: 2017-11-21 | Stop reason: SDUPTHER

## 2017-11-21 RX ORDER — CYANOCOBALAMIN 1000 UG/ML
1000 INJECTION INTRAMUSCULAR; SUBCUTANEOUS ONCE
Status: SHIPPED | OUTPATIENT
Start: 2017-11-21

## 2017-11-21 RX ORDER — ALPRAZOLAM 1 MG/1
TABLET ORAL
Qty: 60 TABLET | Refills: 0 | Status: SHIPPED | OUTPATIENT
Start: 2017-11-21 | End: 2018-02-15 | Stop reason: SDUPTHER

## 2017-11-26 LAB
6-ACETYLMORPHINE: NOT DETECTED
7-AMINOCLONAZEPAM: NOT DETECTED
ALPHA-OH-ALPRAZOLAM: PRESENT
ALPRAZOLAM: PRESENT
AMPHETAMINE: NOT DETECTED
BARBITURATES: NOT DETECTED
BENZOYLECGONINE: NOT DETECTED
BUPRENORPHINE: NOT DETECTED
CARISOPRODOL: NOT DETECTED
CLONAZEPAM: NOT DETECTED
CODEINE: NOT DETECTED
CREATININE URINE: 184 MG/DL (ref 20–400)
DIAZEPAM: NOT DETECTED
DRUGS EXPECTED: NORMAL
EER PAIN MGT DRUG PANEL, HIGH RES/EMIT U: NORMAL
ETHYL GLUCURONIDE: NOT DETECTED
FENTANYL: NOT DETECTED
HYDROCODONE: NOT DETECTED
HYDROMORPHONE: NOT DETECTED
LORAZEPAM: NOT DETECTED
MARIJUANA METABOLITE: NOT DETECTED
MDA: NOT DETECTED
MDEA: NOT DETECTED
MDMA URINE: NOT DETECTED
MEPERIDINE: NOT DETECTED
METHADONE: NOT DETECTED
METHAMPHETAMINE: NOT DETECTED
METHYLPHENIDATE: NOT DETECTED
MIDAZOLAM: NOT DETECTED
MORPHINE: NOT DETECTED
NORBUPRENORPHINE, FREE: NOT DETECTED
NORDIAZEPAM: NOT DETECTED
NORFENTANYL: NOT DETECTED
NORHYDROCODONE, URINE: NOT DETECTED
NOROXYCODONE: PRESENT
NOROXYMORPHONE, URINE: NOT DETECTED
OXAZEPAM: NOT DETECTED
OXYCODONE: PRESENT
OXYMORPHONE: NOT DETECTED
PAIN MANAGEMENT DRUG PANEL: NORMAL
PAIN MANAGEMENT DRUG PANEL: NORMAL
PCP: NOT DETECTED
PHENTERMINE: NOT DETECTED
PROPOXYPHENE: NOT DETECTED
TAPENTADOL, URINE: NOT DETECTED
TAPENTADOL-O-SULFATE, URINE: NOT DETECTED
TEMAZEPAM: NOT DETECTED
TRAMADOL: NOT DETECTED
ZOLPIDEM: NOT DETECTED

## 2017-11-27 ENCOUNTER — TELEPHONE (OUTPATIENT)
Dept: FAMILY MEDICINE CLINIC | Age: 82
End: 2017-11-27

## 2017-11-27 DIAGNOSIS — Z13.21 ENCOUNTER FOR VITAMIN DEFICIENCY SCREENING: Primary | ICD-10-CM

## 2017-11-27 DIAGNOSIS — Z13.1 SCREENING FOR DIABETES MELLITUS: ICD-10-CM

## 2017-11-28 ASSESSMENT — ENCOUNTER SYMPTOMS
SHORTNESS OF BREATH: 0
ABDOMINAL PAIN: 0

## 2017-11-28 NOTE — PROGRESS NOTES
11/21/2017    Yoko Novoa is a 81 y.o. female    Chief Complaint   Patient presents with    Follow-up     3 month follow up meds,        SUBJECTIVE  HPI   Esther Gómez is here today for refill of meds. She actually feels great       Review of Systems   Constitutional: Negative for chills, fatigue and fever. Respiratory: Negative for shortness of breath. Cardiovascular: Negative for chest pain. Gastrointestinal: Negative for abdominal pain. Musculoskeletal: Negative for myalgias. OBJECTIVE  Physical Exam   Constitutional: She is oriented to person, place, and time. She appears well-developed and well-nourished. Neck: No thyromegaly present. Cardiovascular: Normal rate and regular rhythm. Pulmonary/Chest: Effort normal and breath sounds normal.   Musculoskeletal: She exhibits no edema. Neurological: She is alert and oriented to person, place, and time. Psychiatric: She has a normal mood and affect. Her behavior is normal.   Vitals reviewed. Allergies  Codeine    Current Outpatient Prescriptions   Medication Sig Dispense Refill    ALPRAZolam (XANAX) 1 MG tablet TAKE ONE TABLET BY MOUTH TWICE A DAY AS NEEDED FOR ANXIETY. NO ACOHOL. NO DRIVING. Do not take with percocet. . 60 tablet 0    oxyCODONE-acetaminophen (PERCOCET) 7.5-325 MG per tablet Take 1 tablet by mouth every 6 hours as needed for Pain  Max 2-3 PER DAY.  90 tablet 0    pregabalin (LYRICA) 50 MG capsule 1 tab AM 2 tabs PM.. 270 capsule 0    diclofenac sodium (VOLTAREN) 1 % GEL Apply 2-4 grams to right knee TID 5 Tube 2    levothyroxine (SYNTHROID) 100 MCG tablet TAKE ONE TABLET BY MOUTH DAILY 90 tablet 0    carBAMazepine (TEGRETOL-XR) 200 MG extended release tablet Take 200 mg by mouth 3 times daily Indications: taking 800 mg daily, 300mg morning, 200mg afternoon, 300mg at night      atorvastatin (LIPITOR) 80 MG tablet Take 80 mg by mouth daily      amLODIPine (NORVASC) 5 MG tablet Take 5 mg by mouth daily      DULoxetine (CYMBALTA) 20 MG extended release capsule Take 20 mg by mouth daily       carBAMazepine (TEGRETOL-XR) 100 MG extended release tablet Take 1 tablet by mouth 2 times daily (Patient taking differently: Take 300 mg by mouth 3 times daily Indications: Taking 800 mg daily, 300mg, 200mg, 300mg ) 100 tablet 3    allopurinol (ZYLOPRIM) 300 MG tablet Take 300 mg by mouth      Respiratory Therapy Supplies (NEBULIZER COMPRESSOR) KIT 1 kit by Does not apply route once      Roflumilast (DALIRESP) 500 MCG tablet Take 500 mcg by mouth daily      theophylline (PILAR-24) 200 MG SR capsule Take 1 capsule by mouth daily 30 capsule 3    isosorbide mononitrate (IMDUR) 30 MG CR tablet Take 1 tablet by mouth daily. (Patient taking differently: Take 30 mg by mouth 2 times daily.) 90 tablet 0    nitroGLYCERIN (NITROSTAT) 0.4 MG SL tablet 1 SL q 5 min prn chest pain. After 3 tabs, if pain persists, go to ER. 25 tablet 0    azithromycin (ZITHROMAX) 500 MG tablet Take 500 mg by mouth. m-w-f      pantoprazole (PROTONIX) 40 MG tablet Take 40 mg by mouth daily.  metoprolol (LOPRESSOR) 25 MG tablet Take 50 mg by mouth 2 times daily.  PREDNISONE Take 10 mg by mouth daily at 1800       tiotropium (SPIRIVA) 18 MCG inhalation capsule Inhale 18 mcg into the lungs daily.  montelukast (SINGULAIR) 10 MG tablet Take 10 mg by mouth nightly.  psyllium (METAMUCIL SMOOTH TEXTURE) 28 % packet Take 1 packet by mouth 2 times daily. Take with full glass of h20 or juice       albuterol (PROVENTIL HFA;VENTOLIN HFA) 108 (90 BASE) MCG/ACT inhaler Inhale 2 puffs into the lungs every 6 hours as needed.  Potassium Chloride (KLOR-CON PO) Take 20 mEq by mouth daily       fluticasone-salmeterol (ADVAIR DISKUS) 250-50 MCG/DOSE AEPB Inhale 1 puff into the lungs 2 times daily.  clopidogrel (PLAVIX) 75 MG tablet Take 75 mg by mouth daily.  aspirin 81 MG EC tablet Take 81 mg by mouth daily.        Current Facility-Administered Medications   Medication Dose Route Frequency Provider Last Rate Last Dose    cyanocobalamin injection 1,000 mcg  1,000 mcg Intramuscular Once Monica Spence DO        triamcinolone acetonide (KENALOG-40) injection 40 mg  40 mg Intramuscular Once Aidan Cohen MD         Vitals:    11/21/17 1039   BP: (!) 118/55   Pulse:    Temp:      Body mass index is 31.02 kg/m². uds done today    Controlled Substances Monitoring:     Attestation: The Prescription Monitoring Report for this patient was reviewed today. Monica Spence DO)  Documentation: No signs of potential drug abuse or diversion identified., Existing medication contract. ALEN Clark          ASSESSMENT  1. Acquired hypothyroidism  TSH with Reflex   2. Hypertriglyceridemia  Lipid Panel   3. Cerebrovascular accident (CVA), unspecified mechanism ([facility] [address]  CBC Auto Differential   4. Diabetes mellitus screening  Vitamin B12    COMPREHENSIVE METABOLIC PANEL    FOLATE   5. 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 Age-related osteoporosis without current pathological fracture  Vitamin D 25 Hydroxy   7. Anxiety  ALPRAZolam (XANAX) 1 MG tablet    DISCONTINUED: ALPRAZolam (XANAX) 1 MG tablet    DISCONTINUED: ALPRAZolam (XANAX) 1 MG tablet   8. Cervical radiculitis  cyanocobalamin injection 1,000 mcg   9. Encounter for therapeutic drug monitoring  Drug Panel-PM-HI Res-UR Interp-A       PLAN  Pt is stable on current meds. Continue with current meds. New meds this visit:    Orders Placed This Encounter   Medications    DISCONTD: ALPRAZolam (XANAX) 1 MG tablet     Sig: TAKE ONE TABLET BY MOUTH TWICE A DAY AS NEEDED FOR ANXIETY. NO ACOHOL. NO DRIVING. Do not take with percocet. .     Dispense:  60 tablet     Refill:  0     Do not refill before 11/22/17    DISCONTD: ALPRAZolam (XANAX) 1 MG tablet     Sig: TAKE ONE TABLET BY MOUTH TWICE A DAY AS NEEDED FOR ANXIETY. NO ACOHOL. NO DRIVING. Do not take with percocet. .     Dispense:  60 tablet     Refill:  0     Do not refill before 12/22/17    ALPRAZolam (XANAX) 1 MG tablet     Sig: TAKE ONE TABLET BY MOUTH TWICE A DAY AS NEEDED FOR ANXIETY. NO ACOHOL. NO DRIVING. Do not take with percocet. .     Dispense:  60 tablet     Refill:  0     Do not refill before 1/21/18    cyanocobalamin injection 1,000 mcg     New orders placed this visit:    Orders Placed This Encounter   Procedures    CBC Auto Differential     Standing Status:   Future     Number of Occurrences:   1     Standing Expiration Date:   11/21/2018    Lipid Panel     Standing Status:   Future     Number of Occurrences:   1     Standing Expiration Date:   11/21/2018     Order Specific Question:   Is Patient Fasting?/# of Hours     Answer:   12 hours    TSH with Reflex     Standing Status:   Future     Number of Occurrences:   1     Standing Expiration Date:   11/21/2018    Uric Acid     Standing Status:   Future     Number of Occurrences:   1     Standing Expiration Date:   11/21/2018    Vitamin D 25 Hydroxy     Standing Status:   Future     Number of Occurrences:   1     Standing Expiration Date:   11/21/2018    Vitamin B12     Standing Status:   Future     Number of Occurrences:   1     Standing Expiration Date:   11/21/2018    COMPREHENSIVE METABOLIC PANEL     Standing Status:   Future     Number of Occurrences:   1     Standing Expiration Date:   11/21/2018    FOLATE     Standing Status:   Future     Number of Occurrences:   1     Standing Expiration Date:   11/21/2018    Drug Panel-PM-HI Res-UR Interp-A     Current Outpatient Prescriptions:  ALPRAZolam (XANAX) 1 MG tablet, TAKE ONE TABLET BY MOUTH TWICE A DAY AS NEEDED FOR ANXIETY. NO ACOHOL. NO DRIVING. Do not take with percocet. ., Disp: 60 tablet, Rfl: 0  oxyCODONE-acetaminophen (PERCOCET) 7.5-325 MG per tablet, Take 1 tablet by mouth every 6 hours as needed for Pain  Max 2-3 PER DAY., Disp: 90 afternoon, 300mg at night      atorvastatin (LIPITOR) 80 MG tablet Take 80 mg by mouth daily      amLODIPine (NORVASC) 5 MG tablet Take 5 mg by mouth daily      DULoxetine (CYMBALTA) 20 MG extended release capsule Take 20 mg by mouth daily       carBAMazepine (TEGRETOL-XR) 100 MG extended release tablet Take 1 tablet by mouth 2 times daily (Patient taking differently: Take 300 mg by mouth 3 times daily Indications: Taking 800 mg daily, 300mg, 200mg, 300mg ) 100 tablet 3    allopurinol (ZYLOPRIM) 300 MG tablet Take 300 mg by mouth      Respiratory Therapy Supplies (NEBULIZER COMPRESSOR) KIT 1 kit by Does not apply route once      Roflumilast (DALIRESP) 500 MCG tablet Take 500 mcg by mouth daily      theophylline (PILAR-24) 200 MG SR capsule Take 1 capsule by mouth daily 30 capsule 3    isosorbide mononitrate (IMDUR) 30 MG CR tablet Take 1 tablet by mouth daily. (Patient taking differently: Take 30 mg by mouth 2 times daily.) 90 tablet 0    nitroGLYCERIN (NITROSTAT) 0.4 MG SL tablet 1 SL q 5 min prn chest pain. After 3 tabs, if pain persists, go to ER. 25 tablet 0    azithromycin (ZITHROMAX) 500 MG tablet Take 500 mg by mouth. m-w-f      pantoprazole (PROTONIX) 40 MG tablet Take 40 mg by mouth daily.  metoprolol (LOPRESSOR) 25 MG tablet Take 50 mg by mouth 2 times daily.  PREDNISONE Take 10 mg by mouth daily at 1800       tiotropium (SPIRIVA) 18 MCG inhalation capsule Inhale 18 mcg into the lungs daily.  montelukast (SINGULAIR) 10 MG tablet Take 10 mg by mouth nightly.  psyllium (METAMUCIL SMOOTH TEXTURE) 28 % packet Take 1 packet by mouth 2 times daily. Take with full glass of h20 or juice       albuterol (PROVENTIL HFA;VENTOLIN HFA) 108 (90 BASE) MCG/ACT inhaler Inhale 2 puffs into the lungs every 6 hours as needed.       Potassium Chloride (KLOR-CON PO) Take 20 mEq by mouth daily       fluticasone-salmeterol (ADVAIR DISKUS) 250-50 MCG/DOSE AEPB Inhale 1 puff into the lungs 2 times

## 2017-11-30 RX ORDER — ERGOCALCIFEROL (VITAMIN D2) 1250 MCG
50000 CAPSULE ORAL WEEKLY
Qty: 4 CAPSULE | Refills: 3 | Status: SHIPPED | OUTPATIENT
Start: 2017-11-30 | End: 2018-02-15 | Stop reason: SDUPTHER

## 2018-01-02 RX ORDER — ERGOCALCIFEROL (VITAMIN D2) 1250 MCG
50000 CAPSULE ORAL WEEKLY
Qty: 12 CAPSULE | Refills: 1 | Status: CANCELLED | OUTPATIENT
Start: 2018-01-02

## 2018-01-10 ENCOUNTER — OFFICE VISIT (OUTPATIENT)
Dept: PAIN MANAGEMENT | Age: 83
End: 2018-01-10

## 2018-01-10 VITALS
BODY MASS INDEX: 30.99 KG/M2 | DIASTOLIC BLOOD PRESSURE: 98 MMHG | SYSTOLIC BLOOD PRESSURE: 129 MMHG | WEIGHT: 192 LBS | HEART RATE: 61 BPM

## 2018-01-10 DIAGNOSIS — M50.30 DEGENERATION OF CERVICAL INTERVERTEBRAL DISC: ICD-10-CM

## 2018-01-10 DIAGNOSIS — M47.812 CERVICAL SPONDYLOSIS WITHOUT MYELOPATHY: ICD-10-CM

## 2018-01-10 DIAGNOSIS — G89.4 CHRONIC PAIN SYNDROME: ICD-10-CM

## 2018-01-10 DIAGNOSIS — M17.11 PRIMARY OSTEOARTHRITIS OF RIGHT KNEE: ICD-10-CM

## 2018-01-10 DIAGNOSIS — M54.12 CERVICAL RADICULITIS: ICD-10-CM

## 2018-01-10 PROCEDURE — G8598 ASA/ANTIPLAT THER USED: HCPCS | Performed by: INTERNAL MEDICINE

## 2018-01-10 PROCEDURE — G8484 FLU IMMUNIZE NO ADMIN: HCPCS | Performed by: INTERNAL MEDICINE

## 2018-01-10 PROCEDURE — 1036F TOBACCO NON-USER: CPT | Performed by: INTERNAL MEDICINE

## 2018-01-10 PROCEDURE — 1090F PRES/ABSN URINE INCON ASSESS: CPT | Performed by: INTERNAL MEDICINE

## 2018-01-10 PROCEDURE — G8417 CALC BMI ABV UP PARAM F/U: HCPCS | Performed by: INTERNAL MEDICINE

## 2018-01-10 PROCEDURE — G8399 PT W/DXA RESULTS DOCUMENT: HCPCS | Performed by: INTERNAL MEDICINE

## 2018-01-10 PROCEDURE — G8427 DOCREV CUR MEDS BY ELIG CLIN: HCPCS | Performed by: INTERNAL MEDICINE

## 2018-01-10 PROCEDURE — 1123F ACP DISCUSS/DSCN MKR DOCD: CPT | Performed by: INTERNAL MEDICINE

## 2018-01-10 PROCEDURE — 4040F PNEUMOC VAC/ADMIN/RCVD: CPT | Performed by: INTERNAL MEDICINE

## 2018-01-10 PROCEDURE — 99213 OFFICE O/P EST LOW 20 MIN: CPT | Performed by: INTERNAL MEDICINE

## 2018-01-10 RX ORDER — OXYCODONE AND ACETAMINOPHEN 7.5; 325 MG/1; MG/1
1 TABLET ORAL EVERY 6 HOURS PRN
Qty: 90 TABLET | Refills: 0 | Status: SHIPPED | OUTPATIENT
Start: 2018-01-10 | End: 2018-03-07 | Stop reason: SDUPTHER

## 2018-01-10 RX ORDER — PREGABALIN 50 MG/1
CAPSULE ORAL
Qty: 270 CAPSULE | Refills: 1 | Status: SHIPPED | OUTPATIENT
Start: 2018-01-10 | End: 2018-03-07 | Stop reason: SDUPTHER

## 2018-01-10 NOTE — PROGRESS NOTES
mouth once a week 4 capsule 3    ALPRAZolam (XANAX) 1 MG tablet TAKE ONE TABLET BY MOUTH TWICE A DAY AS NEEDED FOR ANXIETY. NO ACOHOL. NO DRIVING. Do not take with percocet. . 60 tablet 0    oxyCODONE-acetaminophen (PERCOCET) 7.5-325 MG per tablet Take 1 tablet by mouth every 6 hours as needed for Pain  Max 2-3 PER DAY. 90 tablet 0    pregabalin (LYRICA) 50 MG capsule 1 tab AM 2 tabs PM.. 270 capsule 0    diclofenac sodium (VOLTAREN) 1 % GEL Apply 2-4 grams to right knee TID 5 Tube 2    levothyroxine (SYNTHROID) 100 MCG tablet TAKE ONE TABLET BY MOUTH DAILY 90 tablet 0    carBAMazepine (TEGRETOL-XR) 200 MG extended release tablet Take 200 mg by mouth 3 times daily Indications: taking 800 mg daily, 300mg morning, 200mg afternoon, 300mg at night      atorvastatin (LIPITOR) 80 MG tablet Take 80 mg by mouth daily      amLODIPine (NORVASC) 5 MG tablet Take 5 mg by mouth daily      DULoxetine (CYMBALTA) 20 MG extended release capsule Take 20 mg by mouth daily       carBAMazepine (TEGRETOL-XR) 100 MG extended release tablet Take 1 tablet by mouth 2 times daily (Patient taking differently: Take 300 mg by mouth 3 times daily Indications: Taking 800 mg daily, 300mg, 200mg, 300mg ) 100 tablet 3    allopurinol (ZYLOPRIM) 300 MG tablet Take 300 mg by mouth      Respiratory Therapy Supplies (NEBULIZER COMPRESSOR) KIT 1 kit by Does not apply route once      Roflumilast (DALIRESP) 500 MCG tablet Take 500 mcg by mouth daily      theophylline (PILAR-24) 200 MG SR capsule Take 1 capsule by mouth daily 30 capsule 3    isosorbide mononitrate (IMDUR) 30 MG CR tablet Take 1 tablet by mouth daily. (Patient taking differently: Take 30 mg by mouth 2 times daily.) 90 tablet 0    nitroGLYCERIN (NITROSTAT) 0.4 MG SL tablet 1 SL q 5 min prn chest pain. After 3 tabs, if pain persists, go to ER.  25 tablet 0    azithromycin (ZITHROMAX) 500 MG tablet Take 500 mg by mouth. m-w-f      pantoprazole (PROTONIX) 40 MG tablet Take 40 mg by BASE) MCG/ACT inhaler Inhale 2 puffs into the lungs every 6 hours as needed.  Potassium Chloride (KLOR-CON PO) Take 20 mEq by mouth daily       fluticasone-salmeterol (ADVAIR DISKUS) 250-50 MCG/DOSE AEPB Inhale 1 puff into the lungs 2 times daily.  clopidogrel (PLAVIX) 75 MG tablet Take 75 mg by mouth daily.  aspirin 81 MG EC tablet Take 81 mg by mouth daily. Current Facility-Administered Medications   Medication Dose Route Frequency Provider Last Rate Last Dose    cyanocobalamin injection 1,000 mcg  1,000 mcg Intramuscular Once Reda Damascus, DO        triamcinolone acetonide (KENALOG-40) injection 40 mg  40 mg Intramuscular Once Karyle Prosper, MD         I will continue her current medication regimen  which is part of the above treatment schedule. It has been helping with Ms. Inman's chronic  medical problems which for this visit include:   Diagnoses of Chronic pain syndrome, Cervical spondylosis without myelopathy, Degeneration of cervical intervertebral disc, Cervical radiculitis, Primary osteoarthritis of right knee, Sciatica, unspecified laterality, and Anxiety were pertinent to this visit. Risks and benefits of the medications and other alternative treatments  including no treatment were discussed with the patient. The common side effects of these medications were also explained to the patient. Informed verbal consent was obtained. Goals of current treatment regimen include improvement in pain, restoration of functioning- with focus on improvement in physical performance, general activity, work or disability,emotional distress, health care utilization and  decreased medication consumption. Will continue to monitor progress towards achieving/maintaining therapeutic goals with special emphasis on  1. Improvement in perceived interfernce  of pain with ADL's. Ability to do home exercises independently.  Ability to do household chores indoor and/or outdoor work and social and

## 2018-02-08 ENCOUNTER — TELEPHONE (OUTPATIENT)
Dept: PHARMACY | Facility: CLINIC | Age: 83
End: 2018-02-08

## 2018-02-09 ENCOUNTER — TELEPHONE (OUTPATIENT)
Dept: FAMILY MEDICINE CLINIC | Age: 83
End: 2018-02-09

## 2018-02-09 DIAGNOSIS — R73.9 HYPERGLYCEMIA: ICD-10-CM

## 2018-02-09 DIAGNOSIS — E78.1 HYPERTRIGLYCERIDEMIA: Primary | ICD-10-CM

## 2018-02-13 ENCOUNTER — TELEPHONE (OUTPATIENT)
Dept: FAMILY MEDICINE CLINIC | Age: 83
End: 2018-02-13

## 2018-02-13 DIAGNOSIS — Z00.00 ROUTINE GENERAL MEDICAL EXAMINATION AT A HEALTH CARE FACILITY: Primary | ICD-10-CM

## 2018-02-13 DIAGNOSIS — E78.1 HYPERTRIGLYCERIDEMIA: ICD-10-CM

## 2018-02-13 DIAGNOSIS — Z00.00 ROUTINE GENERAL MEDICAL EXAMINATION AT A HEALTH CARE FACILITY: ICD-10-CM

## 2018-02-13 DIAGNOSIS — R73.9 HYPERGLYCEMIA: ICD-10-CM

## 2018-02-13 LAB
CHOLESTEROL, TOTAL: 205 MG/DL (ref 0–199)
HDLC SERPL-MCNC: 48 MG/DL (ref 40–60)
LDL CHOLESTEROL CALCULATED: ABNORMAL MG/DL
LDL CHOLESTEROL DIRECT: 57 MG/DL
TRIGL SERPL-MCNC: 488 MG/DL (ref 0–150)
VITAMIN B-12: 663 PG/ML (ref 211–911)
VITAMIN B-12: 691 PG/ML (ref 211–911)
VITAMIN D 25-HYDROXY: 29.3 NG/ML
VITAMIN D 25-HYDROXY: 31.4 NG/ML
VLDLC SERPL CALC-MCNC: ABNORMAL MG/DL

## 2018-02-14 DIAGNOSIS — D50.0 IRON DEFICIENCY ANEMIA DUE TO CHRONIC BLOOD LOSS: ICD-10-CM

## 2018-02-14 LAB
ESTIMATED AVERAGE GLUCOSE: 148.5 MG/DL
HBA1C MFR BLD: 6.8 %

## 2018-02-15 ENCOUNTER — OFFICE VISIT (OUTPATIENT)
Dept: FAMILY MEDICINE CLINIC | Age: 83
End: 2018-02-15

## 2018-02-15 VITALS
TEMPERATURE: 99 F | BODY MASS INDEX: 31.66 KG/M2 | WEIGHT: 197 LBS | SYSTOLIC BLOOD PRESSURE: 122 MMHG | DIASTOLIC BLOOD PRESSURE: 52 MMHG | HEIGHT: 66 IN

## 2018-02-15 DIAGNOSIS — E78.1 HYPERTRIGLYCERIDEMIA: ICD-10-CM

## 2018-02-15 DIAGNOSIS — F41.9 ANXIETY: ICD-10-CM

## 2018-02-15 DIAGNOSIS — E55.9 VITAMIN D DEFICIENCY: ICD-10-CM

## 2018-02-15 PROCEDURE — 4040F PNEUMOC VAC/ADMIN/RCVD: CPT | Performed by: FAMILY MEDICINE

## 2018-02-15 PROCEDURE — G8598 ASA/ANTIPLAT THER USED: HCPCS | Performed by: FAMILY MEDICINE

## 2018-02-15 PROCEDURE — 99214 OFFICE O/P EST MOD 30 MIN: CPT | Performed by: FAMILY MEDICINE

## 2018-02-15 PROCEDURE — 1123F ACP DISCUSS/DSCN MKR DOCD: CPT | Performed by: FAMILY MEDICINE

## 2018-02-15 PROCEDURE — 1036F TOBACCO NON-USER: CPT | Performed by: FAMILY MEDICINE

## 2018-02-15 PROCEDURE — G8399 PT W/DXA RESULTS DOCUMENT: HCPCS | Performed by: FAMILY MEDICINE

## 2018-02-15 PROCEDURE — 96372 THER/PROPH/DIAG INJ SC/IM: CPT | Performed by: FAMILY MEDICINE

## 2018-02-15 PROCEDURE — 1090F PRES/ABSN URINE INCON ASSESS: CPT | Performed by: FAMILY MEDICINE

## 2018-02-15 PROCEDURE — G8484 FLU IMMUNIZE NO ADMIN: HCPCS | Performed by: FAMILY MEDICINE

## 2018-02-15 PROCEDURE — G8417 CALC BMI ABV UP PARAM F/U: HCPCS | Performed by: FAMILY MEDICINE

## 2018-02-15 PROCEDURE — G8427 DOCREV CUR MEDS BY ELIG CLIN: HCPCS | Performed by: FAMILY MEDICINE

## 2018-02-15 RX ORDER — CYANOCOBALAMIN 1000 UG/ML
1000 INJECTION INTRAMUSCULAR; SUBCUTANEOUS ONCE
Status: COMPLETED | OUTPATIENT
Start: 2018-02-15 | End: 2018-02-15

## 2018-02-15 RX ORDER — ERGOCALCIFEROL (VITAMIN D2) 1250 MCG
50000 CAPSULE ORAL WEEKLY
Qty: 4 CAPSULE | Refills: 3 | Status: SHIPPED | OUTPATIENT
Start: 2018-02-15 | End: 2018-04-19 | Stop reason: SDUPTHER

## 2018-02-15 RX ORDER — ALPRAZOLAM 1 MG/1
TABLET ORAL
Qty: 60 TABLET | Refills: 0 | Status: SHIPPED | OUTPATIENT
Start: 2018-02-26 | End: 2018-02-15 | Stop reason: SDUPTHER

## 2018-02-15 RX ORDER — ALPRAZOLAM 1 MG/1
TABLET ORAL
Qty: 60 TABLET | Refills: 0 | Status: SHIPPED | OUTPATIENT
Start: 2018-04-27 | End: 2018-02-15 | Stop reason: SDUPTHER

## 2018-02-15 RX ORDER — ALPRAZOLAM 1 MG/1
TABLET ORAL
Qty: 60 TABLET | Refills: 0 | Status: SHIPPED | OUTPATIENT
Start: 2018-03-28 | End: 2018-02-15 | Stop reason: SDUPTHER

## 2018-02-15 RX ORDER — ALPRAZOLAM 1 MG/1
TABLET ORAL
Qty: 60 TABLET | Refills: 0 | Status: SHIPPED | OUTPATIENT
Start: 2018-02-27 | End: 2018-06-21 | Stop reason: SDUPTHER

## 2018-02-15 RX ADMIN — CYANOCOBALAMIN 1000 MCG: 1000 INJECTION INTRAMUSCULAR; SUBCUTANEOUS at 11:08

## 2018-02-15 ASSESSMENT — ENCOUNTER SYMPTOMS
SHORTNESS OF BREATH: 0
ABDOMINAL PAIN: 0

## 2018-02-15 NOTE — PROGRESS NOTES
glass of h20 or juice       albuterol (PROVENTIL HFA;VENTOLIN HFA) 108 (90 BASE) MCG/ACT inhaler Inhale 2 puffs into the lungs every 6 hours as needed.  Potassium Chloride (KLOR-CON PO) Take 20 mEq by mouth daily       fluticasone-salmeterol (ADVAIR DISKUS) 250-50 MCG/DOSE AEPB Inhale 1 puff into the lungs 2 times daily.  clopidogrel (PLAVIX) 75 MG tablet Take 75 mg by mouth daily.  aspirin 81 MG EC tablet Take 81 mg by mouth daily. Current Facility-Administered Medications   Medication Dose Route Frequency Provider Last Rate Last Dose    cyanocobalamin injection 1,000 mcg  1,000 mcg Intramuscular Once Jennifer Sanders DO        triamcinolone acetonide (KENALOG-40) injection 40 mg  40 mg Intramuscular Once Tricia Cortes MD         Vitals:    02/15/18 1001   BP: (!) 122/52   Temp: 99 °F (37.2 °C)     Body mass index is 31.8 kg/m². Immunization History   Administered Date(s) Administered    Influenza Virus Vaccine 10/01/2014    Influenza, High Dose 09/24/2015, 10/14/2016, 09/28/2017    Pneumococcal 13-valent Conjugate ([id number]) 05/01/2015    Pneumococcal Polysaccharide ([id number]) 08/19/2014    Td 08/19/2014    Zoster Live (Zostavax) 01/01/2013     Lab Review   Orders Only on 02/13/2018   Component Date Value    LDL Direct 02/13/2018 57     Vit D, 25-Hydroxy 02/13/2018 31.4     Vitamin B-12 02/13/2018 691     Vitamin B-12 02/13/2018 663     Vit D, 25-Hydroxy 02/13/2018 29.3*   Orders Only on 02/13/2018   Component Date Value    Cholesterol, Total 02/13/2018 205*    Triglycerides 02/13/2018 488*    HDL 02/13/2018 48     LDL Calculated 02/13/2018 see below     VLDL Cholesterol Calcula* 02/13/2018 see below     Hemoglobin A1C 02/13/2018 6.8     eAG 02/13/2018 148.5        ASSESSMENT  1. Uncontrolled type 2 diabetes mellitus without complication, without long-term current use of insulin (HCC)  VITAMIN B12    Hemoglobin A1C   2.  Anxiety  ALPRAZolam (XANAX) 1 MG tablet DISCONTINUED: ALPRAZolam (XANAX) 1 MG tablet    DISCONTINUED: ALPRAZolam (XANAX) 1 MG tablet    DISCONTINUED: ALPRAZolam (XANAX) 1 MG tablet   3. Hypertriglyceridemia  Lipid Panel   4. Vitamin D deficiency  VITAMIN D 25 HYDROXY    cyanocobalamin injection 1,000 mcg       PLAN  She will decrease daily intake of cake and then recheck numbers in 8 weeks  i9n 2 months vit d  And b12 and a1c and flp  b12 today    Per pt daughter Sheryle Dam -- she filled the last xanax on 1/27/18      New meds this visit:    Orders Placed This Encounter   Medications    ergocalciferol (ERGOCALCIFEROL) 05490 units capsule     Sig: Take 1 capsule by mouth once a week     Dispense:  4 capsule     Refill:  3    DISCONTD: ALPRAZolam (XANAX) 1 MG tablet     Sig: TAKE ONE TABLET BY MOUTH TWICE A DAY AS NEEDED FOR ANXIETY. NO ACOHOL. NO DRIVING. Do not take with percocet. .     Dispense:  60 tablet     Refill:  0     Do not refill before 3/28/18    DISCONTD: ALPRAZolam (XANAX) 1 MG tablet     Sig: TAKE ONE TABLET BY MOUTH TWICE A DAY AS NEEDED FOR ANXIETY. NO ACOHOL. NO DRIVING. Do not take with percocet. .     Dispense:  60 tablet     Refill:  0     Do not refill before 4/27/18    DISCONTD: ALPRAZolam (XANAX) 1 MG tablet     Sig: TAKE ONE TABLET BY MOUTH TWICE A DAY AS NEEDED FOR ANXIETY. NO ACOHOL. NO DRIVING. Do not take with percocet. .     Dispense:  60 tablet     Refill:  0     Do not refill before 5/27/18    ALPRAZolam (XANAX) 1 MG tablet     Sig: TAKE ONE TABLET BY MOUTH TWICE A DAY AS NEEDED FOR ANXIETY. NO ACOHOL. NO DRIVING. Do not take with percocet. .     Dispense:  60 tablet     Refill:  0     Do not refill before 2/27/18    cyanocobalamin injection 1,000 mcg     New orders placed this visit:    Orders Placed This Encounter   Procedures    VITAMIN B12     Standing Status:   Future     Standing Expiration Date:   5/15/2018    VITAMIN D 25 HYDROXY     Standing Status:   Future     Standing Expiration Date:   5/15/2018    Hemoglobin A1C     Standing Status:   Future     Standing Expiration Date:   5/15/2018    Lipid Panel     Standing Status:   Future     Standing Expiration Date:   5/15/2018     Order Specific Question:   Is Patient Fasting?/# of Hours     Answer:   12 hours     Return in about 2 months (around 4/15/2018) for 30 min appt. continue with the following meds:    Outpatient Encounter Prescriptions as of 2/15/2018   Medication Sig Dispense Refill    ergocalciferol (ERGOCALCIFEROL) 09343 units capsule Take 1 capsule by mouth once a week 4 capsule 3    [START ON 2/27/2018] ALPRAZolam (XANAX) 1 MG tablet TAKE ONE TABLET BY MOUTH TWICE A DAY AS NEEDED FOR ANXIETY. NO ACOHOL. NO DRIVING. Do not take with percocet. . 60 tablet 0    levothyroxine (SYNTHROID) 100 MCG tablet TAKE ONE TABLET BY MOUTH DAILY 90 tablet 3    pregabalin (LYRICA) 50 MG capsule 1 tab AM 2 tabs PM.. 270 capsule 1    diclofenac sodium (VOLTAREN) 1 % GEL Apply 2-4 grams to right knee TID 5 Tube 2    carBAMazepine (TEGRETOL-XR) 200 MG extended release tablet Take 200 mg by mouth 3 times daily Indications: taking 800 mg daily, 300mg morning, 200mg afternoon, 300mg at night      atorvastatin (LIPITOR) 80 MG tablet Take 80 mg by mouth daily      amLODIPine (NORVASC) 5 MG tablet Take 5 mg by mouth daily      DULoxetine (CYMBALTA) 20 MG extended release capsule Take 20 mg by mouth daily       carBAMazepine (TEGRETOL-XR) 100 MG extended release tablet Take 1 tablet by mouth 2 times daily (Patient taking differently: Take 300 mg by mouth 3 times daily Indications: Taking 800 mg daily, 300mg, 200mg, 300mg ) 100 tablet 3    allopurinol (ZYLOPRIM) 300 MG tablet Take 300 mg by mouth      Respiratory Therapy Supplies (NEBULIZER COMPRESSOR) KIT 1 kit by Does not apply route once      Roflumilast (DALIRESP) 500 MCG tablet Take 500 mcg by mouth daily      theophylline (PILAR-24) 200 MG SR capsule Take 1 capsule by mouth daily 30 capsule 3    isosorbide mononitrate (IMDUR) 30 MG CR tablet Take 1 tablet by mouth daily. (Patient taking differently: Take 30 mg by mouth 2 times daily.) 90 tablet 0    nitroGLYCERIN (NITROSTAT) 0.4 MG SL tablet 1 SL q 5 min prn chest pain. After 3 tabs, if pain persists, go to ER. 25 tablet 0    azithromycin (ZITHROMAX) 500 MG tablet Take 500 mg by mouth. m-w-f      pantoprazole (PROTONIX) 40 MG tablet Take 40 mg by mouth daily.  metoprolol (LOPRESSOR) 25 MG tablet Take 50 mg by mouth 2 times daily.  PREDNISONE Take 10 mg by mouth daily at 1800       tiotropium (SPIRIVA) 18 MCG inhalation capsule Inhale 18 mcg into the lungs daily.  montelukast (SINGULAIR) 10 MG tablet Take 10 mg by mouth nightly.  psyllium (METAMUCIL SMOOTH TEXTURE) 28 % packet Take 1 packet by mouth 2 times daily. Take with full glass of h20 or juice       albuterol (PROVENTIL HFA;VENTOLIN HFA) 108 (90 BASE) MCG/ACT inhaler Inhale 2 puffs into the lungs every 6 hours as needed.  Potassium Chloride (KLOR-CON PO) Take 20 mEq by mouth daily       fluticasone-salmeterol (ADVAIR DISKUS) 250-50 MCG/DOSE AEPB Inhale 1 puff into the lungs 2 times daily.  clopidogrel (PLAVIX) 75 MG tablet Take 75 mg by mouth daily.  aspirin 81 MG EC tablet Take 81 mg by mouth daily.  [DISCONTINUED] ALPRAZolam (XANAX) 1 MG tablet TAKE ONE TABLET BY MOUTH TWICE A DAY AS NEEDED FOR ANXIETY. NO ACOHOL. NO DRIVING. Do not take with percocet. . 60 tablet 0    [DISCONTINUED] ALPRAZolam (XANAX) 1 MG tablet TAKE ONE TABLET BY MOUTH TWICE A DAY AS NEEDED FOR ANXIETY. NO ACOHOL. NO DRIVING. Do not take with percocet. . 60 tablet 0    [DISCONTINUED] ALPRAZolam (XANAX) 1 MG tablet TAKE ONE TABLET BY MOUTH TWICE A DAY AS NEEDED FOR ANXIETY. NO ACOHOL. NO DRIVING. Do not take with percocet. . 60 tablet 0    [DISCONTINUED] ergocalciferol (ERGOCALCIFEROL) 10840 units capsule Take 1 capsule by mouth once a week 4 capsule 3    [DISCONTINUED] ALPRAZolam (XANAX) 1 MG tablet TAKE

## 2018-03-07 ENCOUNTER — OFFICE VISIT (OUTPATIENT)
Dept: PAIN MANAGEMENT | Age: 83
End: 2018-03-07

## 2018-03-07 VITALS
SYSTOLIC BLOOD PRESSURE: 147 MMHG | HEART RATE: 68 BPM | WEIGHT: 192 LBS | DIASTOLIC BLOOD PRESSURE: 65 MMHG | BODY MASS INDEX: 30.99 KG/M2

## 2018-03-07 DIAGNOSIS — G89.4 CHRONIC PAIN SYNDROME: ICD-10-CM

## 2018-03-07 DIAGNOSIS — M50.30 DEGENERATION OF CERVICAL INTERVERTEBRAL DISC: ICD-10-CM

## 2018-03-07 DIAGNOSIS — M47.812 CERVICAL SPONDYLOSIS WITHOUT MYELOPATHY: ICD-10-CM

## 2018-03-07 DIAGNOSIS — M54.12 CERVICAL RADICULITIS: ICD-10-CM

## 2018-03-07 DIAGNOSIS — M17.11 PRIMARY OSTEOARTHRITIS OF RIGHT KNEE: ICD-10-CM

## 2018-03-07 DIAGNOSIS — M54.30 SCIATICA, UNSPECIFIED LATERALITY: ICD-10-CM

## 2018-03-07 PROCEDURE — 20610 DRAIN/INJ JOINT/BURSA W/O US: CPT | Performed by: INTERNAL MEDICINE

## 2018-03-07 PROCEDURE — G8399 PT W/DXA RESULTS DOCUMENT: HCPCS | Performed by: INTERNAL MEDICINE

## 2018-03-07 PROCEDURE — 1090F PRES/ABSN URINE INCON ASSESS: CPT | Performed by: INTERNAL MEDICINE

## 2018-03-07 PROCEDURE — 1123F ACP DISCUSS/DSCN MKR DOCD: CPT | Performed by: INTERNAL MEDICINE

## 2018-03-07 PROCEDURE — G8598 ASA/ANTIPLAT THER USED: HCPCS | Performed by: INTERNAL MEDICINE

## 2018-03-07 PROCEDURE — G8427 DOCREV CUR MEDS BY ELIG CLIN: HCPCS | Performed by: INTERNAL MEDICINE

## 2018-03-07 PROCEDURE — 1036F TOBACCO NON-USER: CPT | Performed by: INTERNAL MEDICINE

## 2018-03-07 PROCEDURE — 4040F PNEUMOC VAC/ADMIN/RCVD: CPT | Performed by: INTERNAL MEDICINE

## 2018-03-07 PROCEDURE — G8417 CALC BMI ABV UP PARAM F/U: HCPCS | Performed by: INTERNAL MEDICINE

## 2018-03-07 PROCEDURE — G8482 FLU IMMUNIZE ORDER/ADMIN: HCPCS | Performed by: INTERNAL MEDICINE

## 2018-03-07 PROCEDURE — 99213 OFFICE O/P EST LOW 20 MIN: CPT | Performed by: INTERNAL MEDICINE

## 2018-03-07 RX ORDER — OXYCODONE AND ACETAMINOPHEN 7.5; 325 MG/1; MG/1
1 TABLET ORAL EVERY 6 HOURS PRN
Qty: 90 TABLET | Refills: 0 | Status: SHIPPED | OUTPATIENT
Start: 2018-03-07 | End: 2018-05-02 | Stop reason: SDUPTHER

## 2018-03-07 RX ORDER — TRIAMCINOLONE ACETONIDE 40 MG/ML
40 INJECTION, SUSPENSION INTRA-ARTICULAR; INTRAMUSCULAR ONCE
Status: COMPLETED | OUTPATIENT
Start: 2018-03-07 | End: 2018-03-07

## 2018-03-07 RX ORDER — PREGABALIN 50 MG/1
CAPSULE ORAL
Qty: 270 CAPSULE | Refills: 1 | Status: SHIPPED | OUTPATIENT
Start: 2018-03-07 | End: 2018-05-02 | Stop reason: SDUPTHER

## 2018-03-07 RX ADMIN — TRIAMCINOLONE ACETONIDE 40 MG: 40 INJECTION, SUSPENSION INTRA-ARTICULAR; INTRAMUSCULAR at 17:25

## 2018-03-07 NOTE — PROGRESS NOTES
Lew Anaya  1935  [id number]    HISTORY OF PRESENT ILLNESS:  Ms. Zana Pete is a 81 y.o. female returns for a follow up visit for multiple medical problems. Her current presenting problems are   1. Primary osteoarthritis of right knee    2. Chronic pain syndrome    3. Cervical spondylosis without myelopathy    4. Sciatica, unspecified laterality    5. Degeneration of cervical intervertebral disc    6. Cervical radiculitis    . As per information/history obtained from the PADT(patient assessment and documentation tool) - She complains of pain in the neck, shoulders Bilateral, lower back and knees Right with radiation to the buttocks and hips Right She rates the pain 5/10 and describes it as throbbing. Pain is made worse by: walking. Current treatment regimen has helped relieve about 70% of the pain. She denies side effects from the current pain regimen. Patient reports that since the last follow up visit the physical functioning is worse, family/social relationships are unchanged, mood is unchanged and sleep patterns are unchanged, and that the overall functioning is worse. Patient denies neurological bowel or bladder. Patient denies misusing/abusing her narcotic pain medications or using any illegal drugs. There are No indicators for possible drug abuse, addiction or diversion problems. Upon obtaining the medical history from Ms. Zana Pete regarding today's office visit for her presenting problems, patient states her knees are hurting again especially her right side, injection helped 4 months ago. Ms. Zana Pete says she is using her V.Gel PRN. Not sure if she is compliant with her medications. She says her neck pain is baseline, her facial pain is baseline. ALLERGIES: Patients list of allergies were reviewed     MEDICATIONS: Ms. Zana Pete list of medications were reviewed. Her current medications are   Outpatient Medications Prior to Visit   Medication Sig Dispense Refill    ergocalciferol (ERGOCALCIFEROL) 20085 units capsule Take 1 capsule by mouth once a week 4 capsule 3    ALPRAZolam (XANAX) 1 MG tablet TAKE ONE TABLET BY MOUTH TWICE A DAY AS NEEDED FOR ANXIETY. NO ACOHOL. NO DRIVING. Do not take with percocet. . 60 tablet 0    levothyroxine (SYNTHROID) 100 MCG tablet TAKE ONE TABLET BY MOUTH DAILY 90 tablet 3    pregabalin (LYRICA) 50 MG capsule 1 tab AM 2 tabs PM.. 270 capsule 1    diclofenac sodium (VOLTAREN) 1 % GEL Apply 2-4 grams to right knee TID 5 Tube 2    atorvastatin (LIPITOR) 80 MG tablet Take 80 mg by mouth daily      amLODIPine (NORVASC) 5 MG tablet Take 5 mg by mouth daily      carBAMazepine (TEGRETOL-XR) 100 MG extended release tablet Take 1 tablet by mouth 2 times daily (Patient taking differently: Take 300 mg by mouth 3 times daily Indications: Taking 800 mg daily, 300mg, 200mg, 300mg ) 100 tablet 3    allopurinol (ZYLOPRIM) 300 MG tablet Take 300 mg by mouth      Respiratory Therapy Supplies (NEBULIZER COMPRESSOR) KIT 1 kit by Does not apply route once      Roflumilast (DALIRESP) 500 MCG tablet Take 500 mcg by mouth daily      theophylline (PILAR-24) 200 MG SR capsule Take 1 capsule by mouth daily 30 capsule 3    isosorbide mononitrate (IMDUR) 30 MG CR tablet Take 1 tablet by mouth daily. (Patient taking differently: Take 30 mg by mouth 2 times daily.) 90 tablet 0    nitroGLYCERIN (NITROSTAT) 0.4 MG SL tablet 1 SL q 5 min prn chest pain. After 3 tabs, if pain persists, go to ER. 25 tablet 0    azithromycin (ZITHROMAX) 500 MG tablet Take 500 mg by mouth. m-w-f      pantoprazole (PROTONIX) 40 MG tablet Take 40 mg by mouth daily.  metoprolol (LOPRESSOR) 25 MG tablet Take 50 mg by mouth 2 times daily.  PREDNISONE Take 10 mg by mouth daily at 1800       tiotropium (SPIRIVA) 18 MCG inhalation capsule Inhale 18 mcg into the lungs daily.  montelukast (SINGULAIR) 10 MG tablet Take 10 mg by mouth nightly.       psyllium (METAMUCIL SMOOTH TEXTURE) 28 % packet Take 1 packet by mouth 2 times daily. Take with full glass of h20 or juice       albuterol (PROVENTIL HFA;VENTOLIN HFA) 108 (90 BASE) MCG/ACT inhaler Inhale 2 puffs into the lungs every 6 hours as needed.  Potassium Chloride (KLOR-CON PO) Take 20 mEq by mouth daily       fluticasone-salmeterol (ADVAIR DISKUS) 250-50 MCG/DOSE AEPB Inhale 1 puff into the lungs 2 times daily.  clopidogrel (PLAVIX) 75 MG tablet Take 75 mg by mouth daily.  aspirin 81 MG EC tablet Take 81 mg by mouth daily.  carBAMazepine (TEGRETOL-XR) 200 MG extended release tablet Take 200 mg by mouth 3 times daily Indications: taking 800 mg daily, 300mg morning, 200mg afternoon, 300mg at night      DULoxetine (CYMBALTA) 20 MG extended release capsule Take 20 mg by mouth daily        Facility-Administered Medications Prior to Visit   Medication Dose Route Frequency Provider Last Rate Last Dose    cyanocobalamin injection 1,000 mcg  1,000 mcg Intramuscular Once Sandoval Olea,         triamcinolone acetonide (KENALOG-40) injection 40 mg  40 mg Intramuscular Once Yvonne Johnson MD           SOCIAL/FAMILY/PAST MEDICAL HISTORY: Ms. Gemini Rowell, family and past medical history was reviewed. REVIEW OF SYSTEMS:    Respiratory: Negative for apnea, chest tightness and shortness of breath or change in baseline breathing. Gastrointestinal: Negative for nausea, vomiting, abdominal pain, diarrhea, constipation, blood in stool and abdominal distention. PHYSICAL EXAM:   Nursing note and vitals reviewed. BP (!) 147/65   Pulse 68   Wt 192 lb (87.1 kg)   Breastfeeding? No   BMI 30.99 kg/m²   Constitutional: She appears well-developed and well-nourished. No acute distress. Skin: Skin is warm and dry, good turgor. No rash noted. She is not diaphoretic. Cardiovascular: Normal rate, regular rhythm, normal heart sounds, and does not have murmur. Pulmonary/Chest: Effort normal. No respiratory distress.  She + wheezes in the lung fields. She has no rales. + rhonchi     Neurological/Psychiatric:She is alert and oriented to person, place, and time. Coordination is  normal. Her mood isAppropriate and affect is Flat/blunted and Anxious . Other: right Knee: + crepitation/decrease ROM  IMPRESSION:   1. Primary osteoarthritis of right knee    2. Chronic pain syndrome    3. Cervical spondylosis without myelopathy    4. Sciatica, unspecified laterality    5. Degeneration of cervical intervertebral disc    6. Cervical radiculitis        PLAN:  Informed verbal consent was obtained  - Informed verbal consent was obtained from the patient. Risks and benefits of the procedure were explained. Complications of the procedure and side effects of kenalog/ lidocaine were discussed with patient. Using 0.25% marcaine and 1cc of kenalog=40mg the right knee was injected under aseptic conditions. Patient tolerated procedure well. Advised ice and rest.  -walking and stretching exercises as advised  -all Rx options including surgery discussed with patient  -continue with V.gel PRN  -Patient's urine drug screen results with GC/MS confirmation were obtained and reviewed and were negative for any illicit drugs. Prescribed medications were within acceptable range. Current Outpatient Prescriptions   Medication Sig Dispense Refill    ergocalciferol (ERGOCALCIFEROL) 48733 units capsule Take 1 capsule by mouth once a week 4 capsule 3    ALPRAZolam (XANAX) 1 MG tablet TAKE ONE TABLET BY MOUTH TWICE A DAY AS NEEDED FOR ANXIETY. NO ACOHOL. NO DRIVING. Do not take with percocet. . 60 tablet 0    levothyroxine (SYNTHROID) 100 MCG tablet TAKE ONE TABLET BY MOUTH DAILY 90 tablet 3    pregabalin (LYRICA) 50 MG capsule 1 tab AM 2 tabs PM.. 270 capsule 1    diclofenac sodium (VOLTAREN) 1 % GEL Apply 2-4 grams to right knee TID 5 Tube 2    atorvastatin (LIPITOR) 80 MG tablet Take 80 mg by mouth daily      amLODIPine (NORVASC) 5 MG tablet Take 5 mg by mouth daily or if the symptoms worsen, the patient should call the office. While transcribing every attempt was made to maintain the accuracy of the note in terms of it's contents,there may have been some errors made inadvertently. Thank you for allowing me to participate in the care of this patient. Teri Vasquez MD.    Cc: Juaquin Hart,     I, Ibis Nunez, scribing for in the presence  of Dr. Teri Vasquez. 03/07/18  5:16 PM  Jeronimo Sky Assistant  I, Dr. Teri Vasquez, personally performed the services described in this documentation as scribed by  Ibis Nunez MA in my presence and it is both accurate and complete

## 2018-03-22 ENCOUNTER — OFFICE VISIT (OUTPATIENT)
Dept: FAMILY MEDICINE CLINIC | Age: 83
End: 2018-03-22

## 2018-03-22 DIAGNOSIS — S51.011A SKIN TEAR OF RIGHT ELBOW WITHOUT COMPLICATION, INITIAL ENCOUNTER: Primary | ICD-10-CM

## 2018-03-22 PROCEDURE — G8417 CALC BMI ABV UP PARAM F/U: HCPCS | Performed by: FAMILY MEDICINE

## 2018-03-22 PROCEDURE — 1090F PRES/ABSN URINE INCON ASSESS: CPT | Performed by: FAMILY MEDICINE

## 2018-03-22 PROCEDURE — G8428 CUR MEDS NOT DOCUMENT: HCPCS | Performed by: FAMILY MEDICINE

## 2018-03-22 PROCEDURE — G8482 FLU IMMUNIZE ORDER/ADMIN: HCPCS | Performed by: FAMILY MEDICINE

## 2018-03-22 PROCEDURE — G8399 PT W/DXA RESULTS DOCUMENT: HCPCS | Performed by: FAMILY MEDICINE

## 2018-03-22 PROCEDURE — 1036F TOBACCO NON-USER: CPT | Performed by: FAMILY MEDICINE

## 2018-03-22 PROCEDURE — 1123F ACP DISCUSS/DSCN MKR DOCD: CPT | Performed by: FAMILY MEDICINE

## 2018-03-22 PROCEDURE — G8598 ASA/ANTIPLAT THER USED: HCPCS | Performed by: FAMILY MEDICINE

## 2018-03-22 PROCEDURE — 4040F PNEUMOC VAC/ADMIN/RCVD: CPT | Performed by: FAMILY MEDICINE

## 2018-03-22 PROCEDURE — 99213 OFFICE O/P EST LOW 20 MIN: CPT | Performed by: FAMILY MEDICINE

## 2018-03-26 ENCOUNTER — OFFICE VISIT (OUTPATIENT)
Dept: FAMILY MEDICINE CLINIC | Age: 83
End: 2018-03-26

## 2018-03-26 VITALS
TEMPERATURE: 98.5 F | WEIGHT: 195.2 LBS | HEART RATE: 60 BPM | SYSTOLIC BLOOD PRESSURE: 130 MMHG | BODY MASS INDEX: 31.37 KG/M2 | HEIGHT: 66 IN | DIASTOLIC BLOOD PRESSURE: 88 MMHG

## 2018-03-26 DIAGNOSIS — S41.101S ARM WOUND, RIGHT, SEQUELA: Primary | ICD-10-CM

## 2018-03-26 PROCEDURE — 99213 OFFICE O/P EST LOW 20 MIN: CPT | Performed by: FAMILY MEDICINE

## 2018-03-26 PROCEDURE — 4040F PNEUMOC VAC/ADMIN/RCVD: CPT | Performed by: FAMILY MEDICINE

## 2018-03-26 PROCEDURE — 1090F PRES/ABSN URINE INCON ASSESS: CPT | Performed by: FAMILY MEDICINE

## 2018-03-26 PROCEDURE — G8482 FLU IMMUNIZE ORDER/ADMIN: HCPCS | Performed by: FAMILY MEDICINE

## 2018-03-26 PROCEDURE — 1036F TOBACCO NON-USER: CPT | Performed by: FAMILY MEDICINE

## 2018-03-26 PROCEDURE — G8417 CALC BMI ABV UP PARAM F/U: HCPCS | Performed by: FAMILY MEDICINE

## 2018-03-26 PROCEDURE — G8598 ASA/ANTIPLAT THER USED: HCPCS | Performed by: FAMILY MEDICINE

## 2018-03-26 PROCEDURE — G8427 DOCREV CUR MEDS BY ELIG CLIN: HCPCS | Performed by: FAMILY MEDICINE

## 2018-03-26 PROCEDURE — 1123F ACP DISCUSS/DSCN MKR DOCD: CPT | Performed by: FAMILY MEDICINE

## 2018-03-26 PROCEDURE — G8399 PT W/DXA RESULTS DOCUMENT: HCPCS | Performed by: FAMILY MEDICINE

## 2018-03-26 RX ORDER — SULFAMETHOXAZOLE AND TRIMETHOPRIM 800; 160 MG/1; MG/1
1 TABLET ORAL 2 TIMES DAILY
Qty: 20 TABLET | Refills: 0 | Status: SHIPPED | OUTPATIENT
Start: 2018-03-26 | End: 2018-04-05

## 2018-03-27 ASSESSMENT — ENCOUNTER SYMPTOMS
ABDOMINAL PAIN: 0
SHORTNESS OF BREATH: 0

## 2018-03-28 NOTE — PROGRESS NOTES
Take 81 mg by mouth daily. Current Facility-Administered Medications   Medication Dose Route Frequency Provider Last Rate Last Dose    cyanocobalamin injection 1,000 mcg  1,000 mcg Intramuscular Once Elsy Roman DO        triamcinolone acetonide (KENALOG-40) injection 40 mg  40 mg Intramuscular Once Yvonne Johnson MD         Vitals:    03/26/18 1402   BP: 130/88   Pulse: 60   Temp: 98.5 °F (36.9 °C)     Body mass index is 31.51 kg/m². Lab Review   Orders Only on 02/13/2018   Component Date Value    LDL Direct 02/13/2018 57     Vit D, 25-Hydroxy 02/13/2018 31.4     Vitamin B-12 02/13/2018 691     Vitamin B-12 02/13/2018 663     Vit D, 25-Hydroxy 02/13/2018 29.3*   Orders Only on 02/13/2018   Component Date Value    Cholesterol, Total 02/13/2018 205*    Triglycerides 02/13/2018 488*    HDL 02/13/2018 48     LDL Calculated 02/13/2018 see below     VLDL Cholesterol Calcula* 02/13/2018 see below     Hemoglobin A1C 02/13/2018 6.8     eAG 02/13/2018 148.5            ASSESSMENT  1. Arm wound, right, sequela         PLAN  Antibiotic started today  Daily arm rinses/wash with antibact soap  No added vaseleine at this time to the wound  Ab started  Cont with taking daily pictures and let me know inb end of this week. Daughter baltazar. New meds this visit:    Orders Placed This Encounter   Medications    sulfamethoxazole-trimethoprim (BACTRIM DS) 800-160 MG per tablet     Sig: Take 1 tablet by mouth 2 times daily for 10 days     Dispense:  20 tablet     Refill:  0     New orders placed this visit:  No orders of the defined types were placed in this encounter. Return if symptoms worsen or fail to improve.   continue with the following meds:    Outpatient Encounter Prescriptions as of 3/26/2018   Medication Sig Dispense Refill    sulfamethoxazole-trimethoprim (BACTRIM DS) 800-160 MG per tablet Take 1 tablet by mouth 2 times daily for 10 days 20 tablet 0    oxyCODONE-acetaminophen (PERCOCET) mcg into the lungs daily.  montelukast (SINGULAIR) 10 MG tablet Take 10 mg by mouth nightly.  psyllium (METAMUCIL SMOOTH TEXTURE) 28 % packet Take 1 packet by mouth 2 times daily. Take with full glass of h20 or juice       albuterol (PROVENTIL HFA;VENTOLIN HFA) 108 (90 BASE) MCG/ACT inhaler Inhale 2 puffs into the lungs every 6 hours as needed.  Potassium Chloride (KLOR-CON PO) Take 20 mEq by mouth daily       fluticasone-salmeterol (ADVAIR DISKUS) 250-50 MCG/DOSE AEPB Inhale 1 puff into the lungs 2 times daily.  clopidogrel (PLAVIX) 75 MG tablet Take 75 mg by mouth daily.  aspirin 81 MG EC tablet Take 81 mg by mouth daily.        Facility-Administered Encounter Medications as of 3/26/2018   Medication Dose Route Frequency Provider Last Rate Last Dose    cyanocobalamin injection 1,000 mcg  1,000 mcg Intramuscular Once Lluvia No DO        triamcinolone acetonide (KENALOG-40) injection 40 mg  40 mg Intramuscular Once MD Lluvia Amaya D.O.

## 2018-03-28 NOTE — PROGRESS NOTES
Subjective:      Patient ID: Sy Gallegos is a 81 y.o. female.     HPI Sy Gallegos      Review of Systems    Objective:   Physical Exam    Assessment:      ***      Plan:      ***

## 2018-03-29 VITALS — TEMPERATURE: 98.3 F | RESPIRATION RATE: 12 BRPM | WEIGHT: 195 LBS | BODY MASS INDEX: 31.47 KG/M2

## 2018-03-29 NOTE — PROGRESS NOTES
3/22/2018    Gurpreet Dent is a 81 y.o. female    Chief Complaint   Patient presents with   Johnanna Sherley Fall     She fell on Sunday hit right arm on a rocking chair, complains of pain and bleeding       SUBJECTIVE  HPI   Pt here today with her daughter because she hit her right elbow and tore her skin,  They have bandaged it, but want it checked  She tripped -- no loc, did not hit her head. Able to move arm well, no numbness or tinglin    Review of Systems   Constitutional: Negative for chills and fever. Respiratory: Negative for shortness of breath. Cardiovascular: Negative for chest pain. Musculoskeletal: Negative for myalgias. Skin: Positive for wound. Negative for rash. OBJECTIVE  Physical Exam   Constitutional: She is oriented to person, place, and time. She appears well-developed and well-nourished. Neck: No thyromegaly present. Cardiovascular: Normal rate and regular rhythm. Pulmonary/Chest: Effort normal and breath sounds normal.   Musculoskeletal: She exhibits no edema. Neurological: She is alert and oriented to person, place, and time. Skin:   Right post elbow and forearm with superficial tear of skin  +bleeding after soaked arm in water to remove gauze. Psychiatric: She has a normal mood and affect. Her behavior is normal.   Vitals reviewed. Elbow bandaged with extra vaseline in [facility]  Td utd 2014      Allergies  Codeine    Current Outpatient Prescriptions   Medication Sig Dispense Refill    oxyCODONE-acetaminophen (PERCOCET) 7.5-325 MG per tablet Take 1 tablet by mouth every 6 hours as needed for Pain for up to 30 days Max 2-3 PER DAY.  90 tablet 0    pregabalin (LYRICA) 50 MG capsule 1 tab AM 2 tabs PM.. 270 capsule 1    diclofenac sodium (VOLTAREN) 1 % GEL Apply 2-4 grams to right knee TID 5 Tube 1    ergocalciferol (ERGOCALCIFEROL) 92323 units capsule Take 1 capsule by mouth once a week 4 capsule 3    levothyroxine (SYNTHROID) 100 MCG tablet TAKE ONE TABLET BY MOUTH DAILY 90 tablet 3    atorvastatin (LIPITOR) 80 MG tablet Take 80 mg by mouth daily      amLODIPine (NORVASC) 5 MG tablet Take 5 mg by mouth daily      carBAMazepine (TEGRETOL-XR) 100 MG extended release tablet Take 1 tablet by mouth 2 times daily (Patient taking differently: Take 300 mg by mouth 3 times daily Indications: Taking 800 mg daily, 300mg, 200mg, 300mg ) 100 tablet 3    allopurinol (ZYLOPRIM) 300 MG tablet Take 300 mg by mouth      Respiratory Therapy Supplies (NEBULIZER COMPRESSOR) KIT 1 kit by Does not apply route once      Roflumilast (DALIRESP) 500 MCG tablet Take 500 mcg by mouth daily      theophylline (PILAR-24) 200 MG SR capsule Take 1 capsule by mouth daily 30 capsule 3    isosorbide mononitrate (IMDUR) 30 MG CR tablet Take 1 tablet by mouth daily. (Patient taking differently: Take 30 mg by mouth 2 times daily.) 90 tablet 0    nitroGLYCERIN (NITROSTAT) 0.4 MG SL tablet 1 SL q 5 min prn chest pain. After 3 tabs, if pain persists, go to ER. 25 tablet 0    azithromycin (ZITHROMAX) 500 MG tablet Take 500 mg by mouth. m-w-f      pantoprazole (PROTONIX) 40 MG tablet Take 40 mg by mouth daily.  metoprolol (LOPRESSOR) 25 MG tablet Take 50 mg by mouth 2 times daily.  PREDNISONE Take 10 mg by mouth daily at 1800       tiotropium (SPIRIVA) 18 MCG inhalation capsule Inhale 18 mcg into the lungs daily.  montelukast (SINGULAIR) 10 MG tablet Take 10 mg by mouth nightly.  psyllium (METAMUCIL SMOOTH TEXTURE) 28 % packet Take 1 packet by mouth 2 times daily. Take with full glass of h20 or juice       albuterol (PROVENTIL HFA;VENTOLIN HFA) 108 (90 BASE) MCG/ACT inhaler Inhale 2 puffs into the lungs every 6 hours as needed.  Potassium Chloride (KLOR-CON PO) Take 20 mEq by mouth daily       fluticasone-salmeterol (ADVAIR DISKUS) 250-50 MCG/DOSE AEPB Inhale 1 puff into the lungs 2 times daily.  clopidogrel (PLAVIX) 75 MG tablet Take 75 mg by mouth daily.       aspirin 81 tablet Take 5 mg by mouth daily      carBAMazepine (TEGRETOL-XR) 100 MG extended release tablet Take 1 tablet by mouth 2 times daily (Patient taking differently: Take 300 mg by mouth 3 times daily Indications: Taking 800 mg daily, 300mg, 200mg, 300mg ) 100 tablet 3    allopurinol (ZYLOPRIM) 300 MG tablet Take 300 mg by mouth      Respiratory Therapy Supplies (NEBULIZER COMPRESSOR) KIT 1 kit by Does not apply route once      Roflumilast (DALIRESP) 500 MCG tablet Take 500 mcg by mouth daily      theophylline (PILAR-24) 200 MG SR capsule Take 1 capsule by mouth daily 30 capsule 3    isosorbide mononitrate (IMDUR) 30 MG CR tablet Take 1 tablet by mouth daily. (Patient taking differently: Take 30 mg by mouth 2 times daily.) 90 tablet 0    nitroGLYCERIN (NITROSTAT) 0.4 MG SL tablet 1 SL q 5 min prn chest pain. After 3 tabs, if pain persists, go to ER. 25 tablet 0    azithromycin (ZITHROMAX) 500 MG tablet Take 500 mg by mouth. m-w-f      pantoprazole (PROTONIX) 40 MG tablet Take 40 mg by mouth daily.  metoprolol (LOPRESSOR) 25 MG tablet Take 50 mg by mouth 2 times daily.  PREDNISONE Take 10 mg by mouth daily at 1800       tiotropium (SPIRIVA) 18 MCG inhalation capsule Inhale 18 mcg into the lungs daily.  montelukast (SINGULAIR) 10 MG tablet Take 10 mg by mouth nightly.  psyllium (METAMUCIL SMOOTH TEXTURE) 28 % packet Take 1 packet by mouth 2 times daily. Take with full glass of h20 or juice       albuterol (PROVENTIL HFA;VENTOLIN HFA) 108 (90 BASE) MCG/ACT inhaler Inhale 2 puffs into the lungs every 6 hours as needed.  Potassium Chloride (KLOR-CON PO) Take 20 mEq by mouth daily       fluticasone-salmeterol (ADVAIR DISKUS) 250-50 MCG/DOSE AEPB Inhale 1 puff into the lungs 2 times daily.  clopidogrel (PLAVIX) 75 MG tablet Take 75 mg by mouth daily.  aspirin 81 MG EC tablet Take 81 mg by mouth daily.        Facility-Administered Encounter Medications as of 3/22/2018   Medication Dose Route Frequency Provider Last Rate Last Dose    cyanocobalamin injection 1,000 mcg  1,000 mcg Intramuscular Once Kari Jaimes DO        triamcinolone acetonide (KENALOG-40) injection 40 mg  40 mg Intramuscular Once MD Kari Donovan, D.O.

## 2018-03-31 ASSESSMENT — ENCOUNTER SYMPTOMS: SHORTNESS OF BREATH: 0

## 2018-04-17 DIAGNOSIS — E55.9 VITAMIN D DEFICIENCY: ICD-10-CM

## 2018-04-17 DIAGNOSIS — E78.1 HYPERTRIGLYCERIDEMIA: ICD-10-CM

## 2018-04-17 LAB
CHOLESTEROL, TOTAL: 170 MG/DL (ref 0–199)
ESTIMATED AVERAGE GLUCOSE: 128.4 MG/DL
HBA1C MFR BLD: 6.1 %
HDLC SERPL-MCNC: 44 MG/DL (ref 40–60)
LDL CHOLESTEROL CALCULATED: ABNORMAL MG/DL
LDL CHOLESTEROL DIRECT: 57 MG/DL
TRIGL SERPL-MCNC: 395 MG/DL (ref 0–150)
VITAMIN B-12: 776 PG/ML (ref 211–911)
VITAMIN D 25-HYDROXY: 48.4 NG/ML
VLDLC SERPL CALC-MCNC: ABNORMAL MG/DL

## 2018-04-19 ENCOUNTER — OFFICE VISIT (OUTPATIENT)
Dept: FAMILY MEDICINE CLINIC | Age: 83
End: 2018-04-19

## 2018-04-19 VITALS
DIASTOLIC BLOOD PRESSURE: 60 MMHG | BODY MASS INDEX: 30.7 KG/M2 | TEMPERATURE: 98 F | WEIGHT: 191 LBS | SYSTOLIC BLOOD PRESSURE: 120 MMHG | HEIGHT: 66 IN

## 2018-04-19 DIAGNOSIS — E53.8 B12 DEFICIENCY: Primary | ICD-10-CM

## 2018-04-19 PROCEDURE — 1090F PRES/ABSN URINE INCON ASSESS: CPT | Performed by: FAMILY MEDICINE

## 2018-04-19 PROCEDURE — G8598 ASA/ANTIPLAT THER USED: HCPCS | Performed by: FAMILY MEDICINE

## 2018-04-19 PROCEDURE — G8427 DOCREV CUR MEDS BY ELIG CLIN: HCPCS | Performed by: FAMILY MEDICINE

## 2018-04-19 PROCEDURE — 99213 OFFICE O/P EST LOW 20 MIN: CPT | Performed by: FAMILY MEDICINE

## 2018-04-19 PROCEDURE — 4040F PNEUMOC VAC/ADMIN/RCVD: CPT | Performed by: FAMILY MEDICINE

## 2018-04-19 PROCEDURE — 1123F ACP DISCUSS/DSCN MKR DOCD: CPT | Performed by: FAMILY MEDICINE

## 2018-04-19 PROCEDURE — 1036F TOBACCO NON-USER: CPT | Performed by: FAMILY MEDICINE

## 2018-04-19 PROCEDURE — G8399 PT W/DXA RESULTS DOCUMENT: HCPCS | Performed by: FAMILY MEDICINE

## 2018-04-19 PROCEDURE — G8417 CALC BMI ABV UP PARAM F/U: HCPCS | Performed by: FAMILY MEDICINE

## 2018-04-19 PROCEDURE — 96372 THER/PROPH/DIAG INJ SC/IM: CPT | Performed by: FAMILY MEDICINE

## 2018-04-19 RX ORDER — ERGOCALCIFEROL (VITAMIN D2) 1250 MCG
50000 CAPSULE ORAL WEEKLY
Qty: 4 CAPSULE | Refills: 5 | Status: SHIPPED | OUTPATIENT
Start: 2018-04-19 | End: 2018-12-05 | Stop reason: SDUPTHER

## 2018-04-19 RX ORDER — CYANOCOBALAMIN 1000 UG/ML
1000 INJECTION INTRAMUSCULAR; SUBCUTANEOUS ONCE
Status: COMPLETED | OUTPATIENT
Start: 2018-04-19 | End: 2018-04-19

## 2018-04-19 RX ADMIN — CYANOCOBALAMIN 1000 MCG: 1000 INJECTION INTRAMUSCULAR; SUBCUTANEOUS at 10:25

## 2018-04-19 ASSESSMENT — ENCOUNTER SYMPTOMS
DIARRHEA: 0
CONSTIPATION: 0
ABDOMINAL PAIN: 0
EYE PAIN: 0
BLOOD IN STOOL: 0
SHORTNESS OF BREATH: 0
VOMITING: 0
NAUSEA: 0

## 2018-05-02 ENCOUNTER — OFFICE VISIT (OUTPATIENT)
Dept: PAIN MANAGEMENT | Age: 83
End: 2018-05-02

## 2018-05-02 VITALS
BODY MASS INDEX: 30.67 KG/M2 | HEART RATE: 62 BPM | SYSTOLIC BLOOD PRESSURE: 154 MMHG | DIASTOLIC BLOOD PRESSURE: 79 MMHG | WEIGHT: 190 LBS

## 2018-05-02 DIAGNOSIS — M17.11 PRIMARY OSTEOARTHRITIS OF RIGHT KNEE: ICD-10-CM

## 2018-05-02 DIAGNOSIS — M54.30 SCIATICA, UNSPECIFIED LATERALITY: ICD-10-CM

## 2018-05-02 DIAGNOSIS — G89.4 CHRONIC PAIN SYNDROME: ICD-10-CM

## 2018-05-02 DIAGNOSIS — M47.812 CERVICAL SPONDYLOSIS WITHOUT MYELOPATHY: ICD-10-CM

## 2018-05-02 DIAGNOSIS — M54.12 CERVICAL RADICULITIS: ICD-10-CM

## 2018-05-02 DIAGNOSIS — M50.30 DEGENERATION OF CERVICAL INTERVERTEBRAL DISC: ICD-10-CM

## 2018-05-02 PROCEDURE — G8417 CALC BMI ABV UP PARAM F/U: HCPCS | Performed by: INTERNAL MEDICINE

## 2018-05-02 PROCEDURE — 1036F TOBACCO NON-USER: CPT | Performed by: INTERNAL MEDICINE

## 2018-05-02 PROCEDURE — 1123F ACP DISCUSS/DSCN MKR DOCD: CPT | Performed by: INTERNAL MEDICINE

## 2018-05-02 PROCEDURE — G8399 PT W/DXA RESULTS DOCUMENT: HCPCS | Performed by: INTERNAL MEDICINE

## 2018-05-02 PROCEDURE — 4040F PNEUMOC VAC/ADMIN/RCVD: CPT | Performed by: INTERNAL MEDICINE

## 2018-05-02 PROCEDURE — 1090F PRES/ABSN URINE INCON ASSESS: CPT | Performed by: INTERNAL MEDICINE

## 2018-05-02 PROCEDURE — G8427 DOCREV CUR MEDS BY ELIG CLIN: HCPCS | Performed by: INTERNAL MEDICINE

## 2018-05-02 PROCEDURE — G8598 ASA/ANTIPLAT THER USED: HCPCS | Performed by: INTERNAL MEDICINE

## 2018-05-02 PROCEDURE — 99213 OFFICE O/P EST LOW 20 MIN: CPT | Performed by: INTERNAL MEDICINE

## 2018-05-02 RX ORDER — PREGABALIN 50 MG/1
CAPSULE ORAL
Qty: 270 CAPSULE | Refills: 1 | Status: SHIPPED | OUTPATIENT
Start: 2018-05-02 | End: 2018-06-27 | Stop reason: SDUPTHER

## 2018-05-02 RX ORDER — OXYCODONE AND ACETAMINOPHEN 7.5; 325 MG/1; MG/1
1 TABLET ORAL EVERY 6 HOURS PRN
Qty: 90 TABLET | Refills: 0 | Status: SHIPPED | OUTPATIENT
Start: 2018-05-02 | End: 2018-06-27 | Stop reason: SDUPTHER

## 2018-05-31 ENCOUNTER — OFFICE VISIT (OUTPATIENT)
Dept: FAMILY MEDICINE CLINIC | Age: 83
End: 2018-05-31

## 2018-05-31 VITALS
SYSTOLIC BLOOD PRESSURE: 118 MMHG | DIASTOLIC BLOOD PRESSURE: 60 MMHG | WEIGHT: 188 LBS | TEMPERATURE: 98.4 F | BODY MASS INDEX: 30.34 KG/M2

## 2018-05-31 DIAGNOSIS — R30.0 DYSURIA: Primary | ICD-10-CM

## 2018-05-31 LAB
BILIRUBIN, POC: NORMAL
BLOOD URINE, POC: NORMAL
CLARITY, POC: NORMAL
COLOR, POC: NORMAL
GLUCOSE URINE, POC: NORMAL
KETONES, POC: NORMAL
LEUKOCYTE EST, POC: NORMAL
NITRITE, POC: NORMAL
PH, POC: 5.5
PROTEIN, POC: NORMAL
SPECIFIC GRAVITY, POC: 1.01
UROBILINOGEN, POC: 0.2

## 2018-05-31 PROCEDURE — G8427 DOCREV CUR MEDS BY ELIG CLIN: HCPCS | Performed by: FAMILY MEDICINE

## 2018-05-31 PROCEDURE — 81002 URINALYSIS NONAUTO W/O SCOPE: CPT | Performed by: FAMILY MEDICINE

## 2018-05-31 PROCEDURE — 1090F PRES/ABSN URINE INCON ASSESS: CPT | Performed by: FAMILY MEDICINE

## 2018-05-31 PROCEDURE — 1123F ACP DISCUSS/DSCN MKR DOCD: CPT | Performed by: FAMILY MEDICINE

## 2018-05-31 PROCEDURE — G8399 PT W/DXA RESULTS DOCUMENT: HCPCS | Performed by: FAMILY MEDICINE

## 2018-05-31 PROCEDURE — G8598 ASA/ANTIPLAT THER USED: HCPCS | Performed by: FAMILY MEDICINE

## 2018-05-31 PROCEDURE — 99213 OFFICE O/P EST LOW 20 MIN: CPT | Performed by: FAMILY MEDICINE

## 2018-05-31 PROCEDURE — 4040F PNEUMOC VAC/ADMIN/RCVD: CPT | Performed by: FAMILY MEDICINE

## 2018-05-31 PROCEDURE — 1036F TOBACCO NON-USER: CPT | Performed by: FAMILY MEDICINE

## 2018-05-31 PROCEDURE — G8417 CALC BMI ABV UP PARAM F/U: HCPCS | Performed by: FAMILY MEDICINE

## 2018-05-31 RX ORDER — SULFAMETHOXAZOLE AND TRIMETHOPRIM 800; 160 MG/1; MG/1
1 TABLET ORAL 2 TIMES DAILY
Qty: 14 TABLET | Refills: 0 | Status: SHIPPED | OUTPATIENT
Start: 2018-05-31 | End: 2018-06-07

## 2018-05-31 ASSESSMENT — PATIENT HEALTH QUESTIONNAIRE - PHQ9
SUM OF ALL RESPONSES TO PHQ9 QUESTIONS 1 & 2: 0
1. LITTLE INTEREST OR PLEASURE IN DOING THINGS: 0
2. FEELING DOWN, DEPRESSED OR HOPELESS: 0
SUM OF ALL RESPONSES TO PHQ QUESTIONS 1-9: 0

## 2018-05-31 NOTE — PROGRESS NOTES
OBJECTIVE  Physical Exam   Constitutional: She is oriented to person, place, and time. She appears well-developed and well-nourished. Neck: No thyromegaly present. Cardiovascular: Normal rate and regular rhythm. Pulmonary/Chest: Effort normal and breath sounds normal.   Genitourinary:   Genitourinary Comments: Results for POC orders placed in visit on 05/31/18  -POCT Urinalysis no Micro       Result                                            Value                         Ref Range                       Color, UA                                         Light yellow                                                  Clarity, UA                                       cloudy                                                        Glucose, UA POC                                   neg                                                           Bilirubin, UA                                     neg                                                           Ketones, UA                                       neg                                                           Spec Grav, UA                                     1.015                                                         Blood, UA POC                                     small                                                         pH, UA                                            5.5                                                           Protein, UA POC                                   neg                                                           Urobilinogen, UA                                  0.2                                                           Leukocytes, UA                                    Large                                                         Nitrite, UA                                       neg                                                         Musculoskeletal: She exhibits no edema.    Neurological: She is alert and 300 MG tablet Take 300 mg by mouth      Respiratory Therapy Supplies (NEBULIZER COMPRESSOR) KIT 1 kit by Does not apply route once      Roflumilast (DALIRESP) 500 MCG tablet Take 500 mcg by mouth daily      isosorbide mononitrate (IMDUR) 30 MG CR tablet Take 1 tablet by mouth daily. (Patient taking differently: Take 30 mg by mouth 2 times daily.) 90 tablet 0    nitroGLYCERIN (NITROSTAT) 0.4 MG SL tablet 1 SL q 5 min prn chest pain. After 3 tabs, if pain persists, go to ER. 25 tablet 0    azithromycin (ZITHROMAX) 500 MG tablet Take 500 mg by mouth. m-w-f      metoprolol (LOPRESSOR) 25 MG tablet Take 50 mg by mouth 2 times daily.  PREDNISONE Take 10 mg by mouth daily at 1800       tiotropium (SPIRIVA) 18 MCG inhalation capsule Inhale 18 mcg into the lungs daily.  montelukast (SINGULAIR) 10 MG tablet Take 10 mg by mouth nightly.  psyllium (METAMUCIL SMOOTH TEXTURE) 28 % packet Take 1 packet by mouth 2 times daily. Take with full glass of h20 or juice       albuterol (PROVENTIL HFA;VENTOLIN HFA) 108 (90 BASE) MCG/ACT inhaler Inhale 2 puffs into the lungs every 6 hours as needed.  Potassium Chloride (KLOR-CON PO) Take 20 mEq by mouth daily       fluticasone-salmeterol (ADVAIR DISKUS) 250-50 MCG/DOSE AEPB Inhale 1 puff into the lungs 2 times daily.  clopidogrel (PLAVIX) 75 MG tablet Take 75 mg by mouth daily.  aspirin 81 MG EC tablet Take 81 mg by mouth daily.  [DISCONTINUED] theophylline (PILAR-24) 200 MG SR capsule Take 1 capsule by mouth daily 30 capsule 3    [DISCONTINUED] pantoprazole (PROTONIX) 40 MG tablet Take 40 mg by mouth daily.        Facility-Administered Encounter Medications as of 5/31/2018   Medication Dose Route Frequency Provider Last Rate Last Dose    cyanocobalamin injection 1,000 mcg  1,000 mcg Intramuscular Once Sami Pelaez DO        triamcinolone acetonide (KENALOG-40) injection 40 mg  40 mg Intramuscular Once Karri Cheney MD

## 2018-06-03 LAB
ORGANISM: ABNORMAL
URINE CULTURE, ROUTINE: ABNORMAL
URINE CULTURE, ROUTINE: ABNORMAL

## 2018-06-21 ENCOUNTER — OFFICE VISIT (OUTPATIENT)
Dept: FAMILY MEDICINE CLINIC | Age: 83
End: 2018-06-21

## 2018-06-21 VITALS
HEIGHT: 66 IN | WEIGHT: 192 LBS | SYSTOLIC BLOOD PRESSURE: 120 MMHG | BODY MASS INDEX: 30.86 KG/M2 | DIASTOLIC BLOOD PRESSURE: 58 MMHG

## 2018-06-21 DIAGNOSIS — F41.9 ANXIETY: ICD-10-CM

## 2018-06-21 PROCEDURE — G8598 ASA/ANTIPLAT THER USED: HCPCS | Performed by: FAMILY MEDICINE

## 2018-06-21 PROCEDURE — G8399 PT W/DXA RESULTS DOCUMENT: HCPCS | Performed by: FAMILY MEDICINE

## 2018-06-21 PROCEDURE — 1036F TOBACCO NON-USER: CPT | Performed by: FAMILY MEDICINE

## 2018-06-21 PROCEDURE — G8427 DOCREV CUR MEDS BY ELIG CLIN: HCPCS | Performed by: FAMILY MEDICINE

## 2018-06-21 PROCEDURE — 99213 OFFICE O/P EST LOW 20 MIN: CPT | Performed by: FAMILY MEDICINE

## 2018-06-21 PROCEDURE — G8417 CALC BMI ABV UP PARAM F/U: HCPCS | Performed by: FAMILY MEDICINE

## 2018-06-21 PROCEDURE — 4040F PNEUMOC VAC/ADMIN/RCVD: CPT | Performed by: FAMILY MEDICINE

## 2018-06-21 PROCEDURE — 1123F ACP DISCUSS/DSCN MKR DOCD: CPT | Performed by: FAMILY MEDICINE

## 2018-06-21 PROCEDURE — 1090F PRES/ABSN URINE INCON ASSESS: CPT | Performed by: FAMILY MEDICINE

## 2018-06-21 RX ORDER — ALPRAZOLAM 1 MG/1
TABLET ORAL
Qty: 60 TABLET | Refills: 0 | Status: SHIPPED | OUTPATIENT
Start: 2018-07-25 | End: 2018-06-21 | Stop reason: SDUPTHER

## 2018-06-21 RX ORDER — ALPRAZOLAM 1 MG/1
TABLET ORAL
Qty: 60 TABLET | Refills: 0 | Status: SHIPPED | OUTPATIENT
Start: 2018-06-25 | End: 2018-06-21 | Stop reason: SDUPTHER

## 2018-06-21 RX ORDER — ALPRAZOLAM 1 MG/1
TABLET ORAL
Qty: 60 TABLET | Refills: 0 | Status: SHIPPED | OUTPATIENT
Start: 2018-08-24 | End: 2018-09-28 | Stop reason: SDUPTHER

## 2018-06-21 NOTE — PROGRESS NOTES
6/21/2018    Dorian Cedillo is a 81 y.o. female    Chief Complaint   Patient presents with    Anxiety     3 month med check       SUBJECTIVE  HPI Dorian Cedillo is a 81 y.o. female presenting today for a 3 month medication check. Pt here today for xanax refill. No problems with meds  Bottle date 5/26/18  Pill count of #9    Review of Systems   Constitutional: Negative for chills, fatigue and fever. Respiratory: Negative for shortness of breath. Cardiovascular: Negative for chest pain. Gastrointestinal: Negative for abdominal pain. Musculoskeletal: Negative for myalgias. OBJECTIVE  Physical Exam   Constitutional: She is oriented to person, place, and time. She appears well-developed and well-nourished. Neck: No thyromegaly present. Cardiovascular: Normal rate and regular rhythm. Pulmonary/Chest: Effort normal and breath sounds normal.   Musculoskeletal: She exhibits no edema. Neurological: She is alert and oriented to person, place, and time. Psychiatric: She has a normal mood and affect. Her behavior is normal.   Vitals reviewed. Allergies  Codeine    Current Outpatient Prescriptions   Medication Sig Dispense Refill    [START ON 8/24/2018] ALPRAZolam (XANAX) 1 MG tablet TAKE ONE TABLET BY MOUTH TWICE A DAY AS NEEDED FOR ANXIETY. NO ACOHOL. NO DRIVING. Do not take with percocet. . 60 tablet 0    ergocalciferol (ERGOCALCIFEROL) 03771 units capsule Take 1 capsule by mouth once a week 4 capsule 5    levothyroxine (SYNTHROID) 100 MCG tablet TAKE ONE TABLET BY MOUTH DAILY 90 tablet 3    atorvastatin (LIPITOR) 80 MG tablet Take 80 mg by mouth daily      amLODIPine (NORVASC) 5 MG tablet Take 5 mg by mouth daily      carBAMazepine (TEGRETOL-XR) 100 MG extended release tablet Take 1 tablet by mouth 2 times daily (Patient taking differently: Take 300 mg by mouth 3 times daily Indications: Taking 800 mg daily, 300mg, 200mg, 300mg ) 100 tablet 3    allopurinol (ZYLOPRIM) 300 MG tablet Take 300 mg by mouth      Respiratory Therapy Supplies (NEBULIZER COMPRESSOR) KIT 1 kit by Does not apply route once      Roflumilast (DALIRESP) 500 MCG tablet Take 500 mcg by mouth daily      isosorbide mononitrate (IMDUR) 30 MG CR tablet Take 1 tablet by mouth daily. (Patient taking differently: Take 30 mg by mouth 2 times daily.) 90 tablet 0    nitroGLYCERIN (NITROSTAT) 0.4 MG SL tablet 1 SL q 5 min prn chest pain. After 3 tabs, if pain persists, go to ER. 25 tablet 0    azithromycin (ZITHROMAX) 500 MG tablet Take 500 mg by mouth. m-w-f      metoprolol (LOPRESSOR) 25 MG tablet Take 50 mg by mouth 2 times daily.  PREDNISONE Take 10 mg by mouth daily at 1800       tiotropium (SPIRIVA) 18 MCG inhalation capsule Inhale 18 mcg into the lungs daily.  montelukast (SINGULAIR) 10 MG tablet Take 10 mg by mouth nightly.  psyllium (METAMUCIL SMOOTH TEXTURE) 28 % packet Take 1 packet by mouth 2 times daily. Take with full glass of h20 or juice       albuterol (PROVENTIL HFA;VENTOLIN HFA) 108 (90 BASE) MCG/ACT inhaler Inhale 2 puffs into the lungs every 6 hours as needed.  Potassium Chloride (KLOR-CON PO) Take 20 mEq by mouth daily       fluticasone-salmeterol (ADVAIR DISKUS) 250-50 MCG/DOSE AEPB Inhale 1 puff into the lungs 2 times daily.  clopidogrel (PLAVIX) 75 MG tablet Take 75 mg by mouth daily.  aspirin 81 MG EC tablet Take 81 mg by mouth daily.  oxyCODONE-acetaminophen (PERCOCET) 7.5-325 MG per tablet Take 1 tablet by mouth every 6 hours as needed for Pain for up to 30 days. Max 2-3 PER DAY.  90 tablet 0    diclofenac sodium (VOLTAREN) 1 % GEL Apply 2-4 grams to right knee TID 5 Tube 1    pregabalin (LYRICA) 50 MG capsule 1 tab AM 2 tabs PM.. 270 capsule 1     Current Facility-Administered Medications   Medication Dose Route Frequency Provider Last Rate Last Dose    cyanocobalamin injection 1,000 mcg  1,000 mcg Intramuscular Once Dufm Distance, DO refill meds. continue with the following meds:    Outpatient Encounter Prescriptions as of 6/21/2018   Medication Sig Dispense Refill    [START ON 8/24/2018] ALPRAZolam (XANAX) 1 MG tablet TAKE ONE TABLET BY MOUTH TWICE A DAY AS NEEDED FOR ANXIETY. NO ACOHOL. NO DRIVING. Do not take with percocet. . 60 tablet 0    [DISCONTINUED] pregabalin (LYRICA) 50 MG capsule 1 tab AM 2 tabs PM.. 270 capsule 1    [DISCONTINUED] diclofenac sodium (VOLTAREN) 1 % GEL Apply 2-4 grams to right knee TID 5 Tube 1    ergocalciferol (ERGOCALCIFEROL) 96680 units capsule Take 1 capsule by mouth once a week 4 capsule 5    levothyroxine (SYNTHROID) 100 MCG tablet TAKE ONE TABLET BY MOUTH DAILY 90 tablet 3    atorvastatin (LIPITOR) 80 MG tablet Take 80 mg by mouth daily      amLODIPine (NORVASC) 5 MG tablet Take 5 mg by mouth daily      carBAMazepine (TEGRETOL-XR) 100 MG extended release tablet Take 1 tablet by mouth 2 times daily (Patient taking differently: Take 300 mg by mouth 3 times daily Indications: Taking 800 mg daily, 300mg, 200mg, 300mg ) 100 tablet 3    allopurinol (ZYLOPRIM) 300 MG tablet Take 300 mg by mouth      Respiratory Therapy Supplies (NEBULIZER COMPRESSOR) KIT 1 kit by Does not apply route once      Roflumilast (DALIRESP) 500 MCG tablet Take 500 mcg by mouth daily      isosorbide mononitrate (IMDUR) 30 MG CR tablet Take 1 tablet by mouth daily. (Patient taking differently: Take 30 mg by mouth 2 times daily.) 90 tablet 0    nitroGLYCERIN (NITROSTAT) 0.4 MG SL tablet 1 SL q 5 min prn chest pain. After 3 tabs, if pain persists, go to ER. 25 tablet 0    azithromycin (ZITHROMAX) 500 MG tablet Take 500 mg by mouth. m-w-f      metoprolol (LOPRESSOR) 25 MG tablet Take 50 mg by mouth 2 times daily.  PREDNISONE Take 10 mg by mouth daily at 1800       tiotropium (SPIRIVA) 18 MCG inhalation capsule Inhale 18 mcg into the lungs daily.  montelukast (SINGULAIR) 10 MG tablet Take 10 mg by mouth nightly.       psyllium (METAMUCIL SMOOTH TEXTURE) 28 % packet Take 1 packet by mouth 2 times daily. Take with full glass of h20 or juice       albuterol (PROVENTIL HFA;VENTOLIN HFA) 108 (90 BASE) MCG/ACT inhaler Inhale 2 puffs into the lungs every 6 hours as needed.  Potassium Chloride (KLOR-CON PO) Take 20 mEq by mouth daily       fluticasone-salmeterol (ADVAIR DISKUS) 250-50 MCG/DOSE AEPB Inhale 1 puff into the lungs 2 times daily.  clopidogrel (PLAVIX) 75 MG tablet Take 75 mg by mouth daily.  aspirin 81 MG EC tablet Take 81 mg by mouth daily.  [DISCONTINUED] ALPRAZolam (XANAX) 1 MG tablet TAKE ONE TABLET BY MOUTH TWICE A DAY AS NEEDED FOR ANXIETY. NO ACOHOL. NO DRIVING. Do not take with percocet. . 60 tablet 0    [DISCONTINUED] ALPRAZolam (XANAX) 1 MG tablet TAKE ONE TABLET BY MOUTH TWICE A DAY AS NEEDED FOR ANXIETY. NO ACOHOL. NO DRIVING. Do not take with percocet. . 60 tablet 0    [DISCONTINUED] theophylline (PILAR-24) 200 MG SR capsule Take 1 capsule by mouth daily 30 capsule 3     Facility-Administered Encounter Medications as of 6/21/2018   Medication Dose Route Frequency Provider Last Rate Last Dose    cyanocobalamin injection 1,000 mcg  1,000 mcg Intramuscular Once Anabella Fountain DO        triamcinolone acetonide (KENALOG-40) injection 40 mg  40 mg Intramuscular Once MD Anabella Hazel D.O.

## 2018-06-27 ENCOUNTER — OFFICE VISIT (OUTPATIENT)
Dept: PAIN MANAGEMENT | Age: 83
End: 2018-06-27

## 2018-06-27 VITALS
HEART RATE: 56 BPM | WEIGHT: 192 LBS | BODY MASS INDEX: 30.99 KG/M2 | DIASTOLIC BLOOD PRESSURE: 61 MMHG | SYSTOLIC BLOOD PRESSURE: 132 MMHG

## 2018-06-27 DIAGNOSIS — M50.30 DEGENERATION OF CERVICAL INTERVERTEBRAL DISC: ICD-10-CM

## 2018-06-27 DIAGNOSIS — M17.11 PRIMARY OSTEOARTHRITIS OF RIGHT KNEE: ICD-10-CM

## 2018-06-27 DIAGNOSIS — M54.12 CERVICAL RADICULITIS: ICD-10-CM

## 2018-06-27 DIAGNOSIS — M47.812 CERVICAL SPONDYLOSIS WITHOUT MYELOPATHY: ICD-10-CM

## 2018-06-27 DIAGNOSIS — M54.30 SCIATICA, UNSPECIFIED LATERALITY: ICD-10-CM

## 2018-06-27 DIAGNOSIS — G89.4 CHRONIC PAIN SYNDROME: ICD-10-CM

## 2018-06-27 PROCEDURE — G8598 ASA/ANTIPLAT THER USED: HCPCS | Performed by: INTERNAL MEDICINE

## 2018-06-27 PROCEDURE — 1090F PRES/ABSN URINE INCON ASSESS: CPT | Performed by: INTERNAL MEDICINE

## 2018-06-27 PROCEDURE — G8399 PT W/DXA RESULTS DOCUMENT: HCPCS | Performed by: INTERNAL MEDICINE

## 2018-06-27 PROCEDURE — 1123F ACP DISCUSS/DSCN MKR DOCD: CPT | Performed by: INTERNAL MEDICINE

## 2018-06-27 PROCEDURE — 99213 OFFICE O/P EST LOW 20 MIN: CPT | Performed by: INTERNAL MEDICINE

## 2018-06-27 PROCEDURE — G8417 CALC BMI ABV UP PARAM F/U: HCPCS | Performed by: INTERNAL MEDICINE

## 2018-06-27 PROCEDURE — 4040F PNEUMOC VAC/ADMIN/RCVD: CPT | Performed by: INTERNAL MEDICINE

## 2018-06-27 PROCEDURE — 1036F TOBACCO NON-USER: CPT | Performed by: INTERNAL MEDICINE

## 2018-06-27 PROCEDURE — G8427 DOCREV CUR MEDS BY ELIG CLIN: HCPCS | Performed by: INTERNAL MEDICINE

## 2018-06-27 RX ORDER — PREGABALIN 50 MG/1
CAPSULE ORAL
Qty: 270 CAPSULE | Refills: 1 | Status: SHIPPED | OUTPATIENT
Start: 2018-06-27 | End: 2018-09-12 | Stop reason: SDUPTHER

## 2018-06-27 RX ORDER — OXYCODONE AND ACETAMINOPHEN 7.5; 325 MG/1; MG/1
1 TABLET ORAL EVERY 6 HOURS PRN
Qty: 90 TABLET | Refills: 0 | Status: SHIPPED | OUTPATIENT
Start: 2018-06-27 | End: 2018-09-12 | Stop reason: SDUPTHER

## 2018-07-10 ASSESSMENT — ENCOUNTER SYMPTOMS
ABDOMINAL PAIN: 0
SHORTNESS OF BREATH: 0
SHORTNESS OF BREATH: 0
ABDOMINAL PAIN: 0

## 2018-08-02 ENCOUNTER — TELEPHONE (OUTPATIENT)
Dept: FAMILY MEDICINE CLINIC | Age: 83
End: 2018-08-02

## 2018-09-12 ENCOUNTER — OFFICE VISIT (OUTPATIENT)
Dept: PAIN MANAGEMENT | Age: 83
End: 2018-09-12

## 2018-09-12 VITALS
DIASTOLIC BLOOD PRESSURE: 78 MMHG | BODY MASS INDEX: 30.34 KG/M2 | WEIGHT: 188 LBS | SYSTOLIC BLOOD PRESSURE: 174 MMHG | HEART RATE: 60 BPM

## 2018-09-12 DIAGNOSIS — M50.30 DEGENERATION OF CERVICAL INTERVERTEBRAL DISC: ICD-10-CM

## 2018-09-12 DIAGNOSIS — G89.4 CHRONIC PAIN SYNDROME: ICD-10-CM

## 2018-09-12 DIAGNOSIS — M17.11 PRIMARY OSTEOARTHRITIS OF RIGHT KNEE: ICD-10-CM

## 2018-09-12 DIAGNOSIS — M54.12 CERVICAL RADICULITIS: ICD-10-CM

## 2018-09-12 DIAGNOSIS — M47.812 CERVICAL SPONDYLOSIS WITHOUT MYELOPATHY: ICD-10-CM

## 2018-09-12 DIAGNOSIS — M54.30 SCIATICA, UNSPECIFIED LATERALITY: ICD-10-CM

## 2018-09-12 PROCEDURE — 4040F PNEUMOC VAC/ADMIN/RCVD: CPT | Performed by: INTERNAL MEDICINE

## 2018-09-12 PROCEDURE — G8417 CALC BMI ABV UP PARAM F/U: HCPCS | Performed by: INTERNAL MEDICINE

## 2018-09-12 PROCEDURE — 1123F ACP DISCUSS/DSCN MKR DOCD: CPT | Performed by: INTERNAL MEDICINE

## 2018-09-12 PROCEDURE — 1090F PRES/ABSN URINE INCON ASSESS: CPT | Performed by: INTERNAL MEDICINE

## 2018-09-12 PROCEDURE — G8598 ASA/ANTIPLAT THER USED: HCPCS | Performed by: INTERNAL MEDICINE

## 2018-09-12 PROCEDURE — 99213 OFFICE O/P EST LOW 20 MIN: CPT | Performed by: INTERNAL MEDICINE

## 2018-09-12 PROCEDURE — 1036F TOBACCO NON-USER: CPT | Performed by: INTERNAL MEDICINE

## 2018-09-12 PROCEDURE — 1101F PT FALLS ASSESS-DOCD LE1/YR: CPT | Performed by: INTERNAL MEDICINE

## 2018-09-12 PROCEDURE — G8427 DOCREV CUR MEDS BY ELIG CLIN: HCPCS | Performed by: INTERNAL MEDICINE

## 2018-09-12 PROCEDURE — G8399 PT W/DXA RESULTS DOCUMENT: HCPCS | Performed by: INTERNAL MEDICINE

## 2018-09-12 RX ORDER — PREGABALIN 50 MG/1
CAPSULE ORAL
Qty: 270 CAPSULE | Refills: 1 | Status: SHIPPED | OUTPATIENT
Start: 2018-09-12 | End: 2019-01-09 | Stop reason: SDUPTHER

## 2018-09-12 RX ORDER — OXYCODONE AND ACETAMINOPHEN 7.5; 325 MG/1; MG/1
1 TABLET ORAL EVERY 6 HOURS PRN
Qty: 90 TABLET | Refills: 0 | Status: SHIPPED | OUTPATIENT
Start: 2018-09-12 | End: 2019-01-09 | Stop reason: SDUPTHER

## 2018-09-12 NOTE — PROGRESS NOTES
Tube 1    pregabalin (LYRICA) 50 MG capsule 1 tab AM 2 tabs PM.. 270 capsule 1    ALPRAZolam (XANAX) 1 MG tablet TAKE ONE TABLET BY MOUTH TWICE A DAY AS NEEDED FOR ANXIETY. NO ACOHOL. NO DRIVING. Do not take with percocet. . 60 tablet 0    ergocalciferol (ERGOCALCIFEROL) 07471 units capsule Take 1 capsule by mouth once a week 4 capsule 5    levothyroxine (SYNTHROID) 100 MCG tablet TAKE ONE TABLET BY MOUTH DAILY 90 tablet 3    atorvastatin (LIPITOR) 80 MG tablet Take 80 mg by mouth daily      amLODIPine (NORVASC) 5 MG tablet Take 5 mg by mouth daily      carBAMazepine (TEGRETOL-XR) 100 MG extended release tablet Take 1 tablet by mouth 2 times daily (Patient taking differently: Take 300 mg by mouth 3 times daily Indications: Taking 800 mg daily, 300mg, 200mg, 300mg ) 100 tablet 3    allopurinol (ZYLOPRIM) 300 MG tablet Take 300 mg by mouth      Respiratory Therapy Supplies (NEBULIZER COMPRESSOR) KIT 1 kit by Does not apply route once      Roflumilast (DALIRESP) 500 MCG tablet Take 500 mcg by mouth daily      isosorbide mononitrate (IMDUR) 30 MG CR tablet Take 1 tablet by mouth daily. (Patient taking differently: Take 30 mg by mouth 2 times daily.) 90 tablet 0    nitroGLYCERIN (NITROSTAT) 0.4 MG SL tablet 1 SL q 5 min prn chest pain. After 3 tabs, if pain persists, go to ER. 25 tablet 0    azithromycin (ZITHROMAX) 500 MG tablet Take 500 mg by mouth. m-w-f      metoprolol (LOPRESSOR) 25 MG tablet Take 50 mg by mouth 2 times daily.  PREDNISONE Take 10 mg by mouth daily at 1800       tiotropium (SPIRIVA) 18 MCG inhalation capsule Inhale 18 mcg into the lungs daily.  montelukast (SINGULAIR) 10 MG tablet Take 10 mg by mouth nightly.  psyllium (METAMUCIL SMOOTH TEXTURE) 28 % packet Take 1 packet by mouth 2 times daily. Take with full glass of h20 or juice       albuterol (PROVENTIL HFA;VENTOLIN HFA) 108 (90 BASE) MCG/ACT inhaler Inhale 2 puffs into the lungs every 6 hours as needed.       current regimen  -Walking as tolerated 15-20 minutes daily  -continue with V. Gel and Lyrica, same dose  -She was advised to increase fluids ( 5-7  glasses of fluid daily), limit caffeine, avoid cheese products, increase dietary fiber, increase activity and exercise as tolerated and relax regularly and enjoy meals  -continue with Percocet 1-2 per day     Current Outpatient Prescriptions   Medication Sig Dispense Refill    diclofenac sodium (VOLTAREN) 1 % GEL Apply 2-4 grams to right knee TID 5 Tube 1    pregabalin (LYRICA) 50 MG capsule 1 tab AM 2 tabs PM.. 270 capsule 1    ALPRAZolam (XANAX) 1 MG tablet TAKE ONE TABLET BY MOUTH TWICE A DAY AS NEEDED FOR ANXIETY. NO ACOHOL. NO DRIVING. Do not take with percocet. . 60 tablet 0    ergocalciferol (ERGOCALCIFEROL) 98361 units capsule Take 1 capsule by mouth once a week 4 capsule 5    levothyroxine (SYNTHROID) 100 MCG tablet TAKE ONE TABLET BY MOUTH DAILY 90 tablet 3    atorvastatin (LIPITOR) 80 MG tablet Take 80 mg by mouth daily      amLODIPine (NORVASC) 5 MG tablet Take 5 mg by mouth daily      carBAMazepine (TEGRETOL-XR) 100 MG extended release tablet Take 1 tablet by mouth 2 times daily (Patient taking differently: Take 300 mg by mouth 3 times daily Indications: Taking 800 mg daily, 300mg, 200mg, 300mg ) 100 tablet 3    allopurinol (ZYLOPRIM) 300 MG tablet Take 300 mg by mouth      Respiratory Therapy Supplies (NEBULIZER COMPRESSOR) KIT 1 kit by Does not apply route once      Roflumilast (DALIRESP) 500 MCG tablet Take 500 mcg by mouth daily      isosorbide mononitrate (IMDUR) 30 MG CR tablet Take 1 tablet by mouth daily. (Patient taking differently: Take 30 mg by mouth 2 times daily.) 90 tablet 0    nitroGLYCERIN (NITROSTAT) 0.4 MG SL tablet 1 SL q 5 min prn chest pain. After 3 tabs, if pain persists, go to ER.  25 tablet 0    azithromycin (ZITHROMAX) 500 MG tablet Take 500 mg by mouth. m-w-f      metoprolol (LOPRESSOR) 25 MG tablet Take 50 mg by mouth

## 2018-09-28 ENCOUNTER — OFFICE VISIT (OUTPATIENT)
Dept: FAMILY MEDICINE CLINIC | Age: 83
End: 2018-09-28
Payer: MEDICARE

## 2018-09-28 VITALS
BODY MASS INDEX: 30.37 KG/M2 | SYSTOLIC BLOOD PRESSURE: 106 MMHG | HEIGHT: 66 IN | WEIGHT: 189 LBS | DIASTOLIC BLOOD PRESSURE: 60 MMHG

## 2018-09-28 DIAGNOSIS — F41.9 ANXIETY: Primary | ICD-10-CM

## 2018-09-28 DIAGNOSIS — E53.8 B12 DEFICIENCY: ICD-10-CM

## 2018-09-28 DIAGNOSIS — Z23 NEED FOR INFLUENZA VACCINATION: ICD-10-CM

## 2018-09-28 LAB — VITAMIN B-12: 705 PG/ML (ref 211–911)

## 2018-09-28 PROCEDURE — 90662 IIV NO PRSV INCREASED AG IM: CPT | Performed by: FAMILY MEDICINE

## 2018-09-28 PROCEDURE — G0008 ADMIN INFLUENZA VIRUS VAC: HCPCS | Performed by: FAMILY MEDICINE

## 2018-09-28 PROCEDURE — G8417 CALC BMI ABV UP PARAM F/U: HCPCS | Performed by: FAMILY MEDICINE

## 2018-09-28 PROCEDURE — G8598 ASA/ANTIPLAT THER USED: HCPCS | Performed by: FAMILY MEDICINE

## 2018-09-28 PROCEDURE — 4040F PNEUMOC VAC/ADMIN/RCVD: CPT | Performed by: FAMILY MEDICINE

## 2018-09-28 PROCEDURE — 1036F TOBACCO NON-USER: CPT | Performed by: FAMILY MEDICINE

## 2018-09-28 PROCEDURE — G8427 DOCREV CUR MEDS BY ELIG CLIN: HCPCS | Performed by: FAMILY MEDICINE

## 2018-09-28 PROCEDURE — G8399 PT W/DXA RESULTS DOCUMENT: HCPCS | Performed by: FAMILY MEDICINE

## 2018-09-28 PROCEDURE — 1101F PT FALLS ASSESS-DOCD LE1/YR: CPT | Performed by: FAMILY MEDICINE

## 2018-09-28 PROCEDURE — 1090F PRES/ABSN URINE INCON ASSESS: CPT | Performed by: FAMILY MEDICINE

## 2018-09-28 PROCEDURE — 99213 OFFICE O/P EST LOW 20 MIN: CPT | Performed by: FAMILY MEDICINE

## 2018-09-28 PROCEDURE — 1123F ACP DISCUSS/DSCN MKR DOCD: CPT | Performed by: FAMILY MEDICINE

## 2018-09-28 RX ORDER — ALPRAZOLAM 1 MG/1
TABLET ORAL
Qty: 60 TABLET | Refills: 0 | Status: SHIPPED | OUTPATIENT
Start: 2018-11-07 | End: 2018-09-28 | Stop reason: SDUPTHER

## 2018-09-28 RX ORDER — ALPRAZOLAM 1 MG/1
TABLET ORAL
Qty: 60 TABLET | Refills: 0 | Status: SHIPPED | OUTPATIENT
Start: 2018-10-08 | End: 2018-09-28 | Stop reason: SDUPTHER

## 2018-09-28 RX ORDER — ALPRAZOLAM 1 MG/1
TABLET ORAL
Qty: 60 TABLET | Refills: 0 | Status: SHIPPED | OUTPATIENT
Start: 2018-12-07 | End: 2019-01-03 | Stop reason: SDUPTHER

## 2018-09-28 NOTE — PROGRESS NOTES
Vaccine Information Sheet, \"Influenza - Inactivated\"  given to Shari Whittington, or parent/legal guardian of  Shari Whittington and verbalized understanding. Patient responses:    Have you ever had a reaction to a flu vaccine? No  Are you able to eat eggs without adverse effects? Yes  Do you have any current illness? No  Have you ever had Guillian [place] Syndrome? No    Flu vaccine given per order. Please see immunization tab.
Refill:  0     Do not refill before 10/8/18    DISCONTD: ALPRAZolam (XANAX) 1 MG tablet     Sig: TAKE ONE TABLET BY MOUTH TWICE A DAY AS NEEDED FOR ANXIETY. NO ACOHOL. NO DRIVING. Do not take with percocet. .     Dispense:  60 tablet     Refill:  0     Do not refill before 11/7/18    ALPRAZolam (XANAX) 1 MG tablet     Sig: TAKE ONE TABLET BY MOUTH TWICE A DAY AS NEEDED FOR ANXIETY. NO ACOHOL. NO DRIVING. Do not take with percocet. .     Dispense:  60 tablet     Refill:  0     Do not refill before 12/7/18     New orders placed this visit:    Orders Placed This Encounter   Procedures    INFLUENZA, HIGH DOSE, 65 YRS +, IM, PF, PREFILL SYR, 0.5ML (FLUZONE HD)    VITAMIN B12     Standing Status:   Future     Number of Occurrences:   1     Standing Expiration Date:   9/28/2019     Return in about 3 months (around 1/2/2019) for refill meds 1st week jan 2019. continue with the following meds:    Outpatient Encounter Prescriptions as of 9/28/2018   Medication Sig Dispense Refill    [START ON 12/7/2018] ALPRAZolam (XANAX) 1 MG tablet TAKE ONE TABLET BY MOUTH TWICE A DAY AS NEEDED FOR ANXIETY. NO ACOHOL. NO DRIVING. Do not take with percocet. . 60 tablet 0    diclofenac sodium (VOLTAREN) 1 % GEL Apply 2-4 grams to right knee TID 5 Tube 1    pregabalin (LYRICA) 50 MG capsule 1 tab AM 2 tabs PM.. 270 capsule 1    oxyCODONE-acetaminophen (PERCOCET) 7.5-325 MG per tablet Take 1 tablet by mouth every 6 hours as needed for Pain for up to 30 days. Max 2-3 PER DAY.  90 tablet 0    ergocalciferol (ERGOCALCIFEROL) 74836 units capsule Take 1 capsule by mouth once a week 4 capsule 5    levothyroxine (SYNTHROID) 100 MCG tablet TAKE ONE TABLET BY MOUTH DAILY 90 tablet 3    atorvastatin (LIPITOR) 80 MG tablet Take 80 mg by mouth daily      amLODIPine (NORVASC) 5 MG tablet Take 5 mg by mouth daily      carBAMazepine (TEGRETOL-XR) 100 MG extended release tablet Take 1 tablet by mouth 2 times daily (Patient taking differently: Take

## 2018-10-03 ASSESSMENT — ENCOUNTER SYMPTOMS
CONSTIPATION: 0
ABDOMINAL PAIN: 0
NAUSEA: 0
BLOOD IN STOOL: 0
SHORTNESS OF BREATH: 1
DIARRHEA: 0
VOMITING: 0
EYE PAIN: 0

## 2018-12-07 RX ORDER — ERGOCALCIFEROL 1.25 MG/1
CAPSULE ORAL
Qty: 4 CAPSULE | Refills: 4 | Status: SHIPPED | OUTPATIENT
Start: 2018-12-07 | End: 2019-05-08 | Stop reason: SDUPTHER

## 2019-01-01 ENCOUNTER — OFFICE VISIT (OUTPATIENT)
Dept: FAMILY MEDICINE CLINIC | Age: 84
End: 2019-01-01
Payer: MEDICARE

## 2019-01-01 ENCOUNTER — OFFICE VISIT (OUTPATIENT)
Dept: PAIN MANAGEMENT | Age: 84
End: 2019-01-01
Payer: MEDICARE

## 2019-01-01 VITALS
HEART RATE: 72 BPM | SYSTOLIC BLOOD PRESSURE: 151 MMHG | WEIGHT: 203 LBS | BODY MASS INDEX: 32.77 KG/M2 | DIASTOLIC BLOOD PRESSURE: 66 MMHG

## 2019-01-01 VITALS
BODY MASS INDEX: 31.47 KG/M2 | SYSTOLIC BLOOD PRESSURE: 110 MMHG | DIASTOLIC BLOOD PRESSURE: 68 MMHG | TEMPERATURE: 97.7 F | WEIGHT: 195.8 LBS | HEIGHT: 66 IN

## 2019-01-01 DIAGNOSIS — M50.30 DEGENERATION OF CERVICAL INTERVERTEBRAL DISC: ICD-10-CM

## 2019-01-01 DIAGNOSIS — G50.0 TRIGEMINAL NEURALGIA: ICD-10-CM

## 2019-01-01 DIAGNOSIS — M47.812 CERVICAL SPONDYLOSIS WITHOUT MYELOPATHY: ICD-10-CM

## 2019-01-01 DIAGNOSIS — M17.11 PRIMARY OSTEOARTHRITIS OF RIGHT KNEE: ICD-10-CM

## 2019-01-01 DIAGNOSIS — M54.12 CERVICAL RADICULITIS: ICD-10-CM

## 2019-01-01 DIAGNOSIS — G89.4 CHRONIC PAIN SYNDROME: ICD-10-CM

## 2019-01-01 PROCEDURE — 1090F PRES/ABSN URINE INCON ASSESS: CPT | Performed by: FAMILY MEDICINE

## 2019-01-01 PROCEDURE — 1123F ACP DISCUSS/DSCN MKR DOCD: CPT | Performed by: INTERNAL MEDICINE

## 2019-01-01 PROCEDURE — G8598 ASA/ANTIPLAT THER USED: HCPCS | Performed by: INTERNAL MEDICINE

## 2019-01-01 PROCEDURE — G8399 PT W/DXA RESULTS DOCUMENT: HCPCS | Performed by: FAMILY MEDICINE

## 2019-01-01 PROCEDURE — G8482 FLU IMMUNIZE ORDER/ADMIN: HCPCS | Performed by: FAMILY MEDICINE

## 2019-01-01 PROCEDURE — 1036F TOBACCO NON-USER: CPT | Performed by: FAMILY MEDICINE

## 2019-01-01 PROCEDURE — 1090F PRES/ABSN URINE INCON ASSESS: CPT | Performed by: INTERNAL MEDICINE

## 2019-01-01 PROCEDURE — G8598 ASA/ANTIPLAT THER USED: HCPCS | Performed by: FAMILY MEDICINE

## 2019-01-01 PROCEDURE — 1036F TOBACCO NON-USER: CPT | Performed by: INTERNAL MEDICINE

## 2019-01-01 PROCEDURE — G8399 PT W/DXA RESULTS DOCUMENT: HCPCS | Performed by: INTERNAL MEDICINE

## 2019-01-01 PROCEDURE — 99213 OFFICE O/P EST LOW 20 MIN: CPT | Performed by: INTERNAL MEDICINE

## 2019-01-01 PROCEDURE — G8482 FLU IMMUNIZE ORDER/ADMIN: HCPCS | Performed by: INTERNAL MEDICINE

## 2019-01-01 PROCEDURE — 4040F PNEUMOC VAC/ADMIN/RCVD: CPT | Performed by: INTERNAL MEDICINE

## 2019-01-01 PROCEDURE — G8417 CALC BMI ABV UP PARAM F/U: HCPCS | Performed by: INTERNAL MEDICINE

## 2019-01-01 PROCEDURE — G8417 CALC BMI ABV UP PARAM F/U: HCPCS | Performed by: FAMILY MEDICINE

## 2019-01-01 PROCEDURE — G8427 DOCREV CUR MEDS BY ELIG CLIN: HCPCS | Performed by: INTERNAL MEDICINE

## 2019-01-01 PROCEDURE — 4040F PNEUMOC VAC/ADMIN/RCVD: CPT | Performed by: FAMILY MEDICINE

## 2019-01-01 PROCEDURE — 99213 OFFICE O/P EST LOW 20 MIN: CPT | Performed by: FAMILY MEDICINE

## 2019-01-01 PROCEDURE — G8427 DOCREV CUR MEDS BY ELIG CLIN: HCPCS | Performed by: FAMILY MEDICINE

## 2019-01-01 PROCEDURE — 1123F ACP DISCUSS/DSCN MKR DOCD: CPT | Performed by: FAMILY MEDICINE

## 2019-01-01 RX ORDER — OXYCODONE AND ACETAMINOPHEN 7.5; 325 MG/1; MG/1
1 TABLET ORAL EVERY 6 HOURS PRN
Qty: 90 TABLET | Refills: 0 | Status: SHIPPED | OUTPATIENT
Start: 2019-01-01 | End: 2020-01-01 | Stop reason: SDUPTHER

## 2019-01-01 RX ORDER — ALPRAZOLAM 1 MG/1
TABLET ORAL
Qty: 60 TABLET | Refills: 0 | Status: SHIPPED | OUTPATIENT
Start: 2019-01-22 | End: 2019-01-01 | Stop reason: SDUPTHER

## 2019-01-01 RX ORDER — PREGABALIN 50 MG/1
50 CAPSULE ORAL DAILY
Qty: 90 CAPSULE | Refills: 0 | Status: SHIPPED | OUTPATIENT
Start: 2019-01-01 | End: 2019-01-01 | Stop reason: SDUPTHER

## 2019-01-01 RX ORDER — LEVOTHYROXINE SODIUM 0.1 MG/1
TABLET ORAL
Qty: 90 TABLET | Refills: 0 | Status: SHIPPED | OUTPATIENT
Start: 2019-01-01 | End: 2020-01-01 | Stop reason: SDUPTHER

## 2019-01-01 RX ORDER — PREGABALIN 50 MG/1
50 CAPSULE ORAL DAILY
Qty: 90 CAPSULE | Refills: 0 | Status: SHIPPED | OUTPATIENT
Start: 2019-01-01 | End: 2020-01-01 | Stop reason: SDUPTHER

## 2019-01-01 RX ORDER — ALPRAZOLAM 1 MG/1
TABLET ORAL
Qty: 60 TABLET | Refills: 0 | Status: SHIPPED | OUTPATIENT
Start: 2020-01-01 | End: 2020-01-01 | Stop reason: SDUPTHER

## 2019-01-01 RX ORDER — ALPRAZOLAM 1 MG/1
TABLET ORAL
Qty: 60 TABLET | Refills: 0 | Status: SHIPPED | OUTPATIENT
Start: 2019-01-01 | End: 2019-01-01 | Stop reason: SDUPTHER

## 2019-01-01 ASSESSMENT — ENCOUNTER SYMPTOMS
ABDOMINAL PAIN: 0
SHORTNESS OF BREATH: 0

## 2019-01-03 ENCOUNTER — OFFICE VISIT (OUTPATIENT)
Dept: FAMILY MEDICINE CLINIC | Age: 84
End: 2019-01-03
Payer: MEDICARE

## 2019-01-03 VITALS
HEIGHT: 66 IN | DIASTOLIC BLOOD PRESSURE: 80 MMHG | SYSTOLIC BLOOD PRESSURE: 120 MMHG | TEMPERATURE: 98.2 F | WEIGHT: 199.2 LBS | BODY MASS INDEX: 32.02 KG/M2

## 2019-01-03 DIAGNOSIS — Z02.83 ENCOUNTER FOR DRUG SCREENING: ICD-10-CM

## 2019-01-03 DIAGNOSIS — F41.9 ANXIETY: Primary | ICD-10-CM

## 2019-01-03 PROCEDURE — G8417 CALC BMI ABV UP PARAM F/U: HCPCS | Performed by: FAMILY MEDICINE

## 2019-01-03 PROCEDURE — 1090F PRES/ABSN URINE INCON ASSESS: CPT | Performed by: FAMILY MEDICINE

## 2019-01-03 PROCEDURE — G8482 FLU IMMUNIZE ORDER/ADMIN: HCPCS | Performed by: FAMILY MEDICINE

## 2019-01-03 PROCEDURE — 99213 OFFICE O/P EST LOW 20 MIN: CPT | Performed by: FAMILY MEDICINE

## 2019-01-03 PROCEDURE — G8427 DOCREV CUR MEDS BY ELIG CLIN: HCPCS | Performed by: FAMILY MEDICINE

## 2019-01-03 PROCEDURE — G8399 PT W/DXA RESULTS DOCUMENT: HCPCS | Performed by: FAMILY MEDICINE

## 2019-01-03 PROCEDURE — 1123F ACP DISCUSS/DSCN MKR DOCD: CPT | Performed by: FAMILY MEDICINE

## 2019-01-03 PROCEDURE — 4040F PNEUMOC VAC/ADMIN/RCVD: CPT | Performed by: FAMILY MEDICINE

## 2019-01-03 PROCEDURE — G8598 ASA/ANTIPLAT THER USED: HCPCS | Performed by: FAMILY MEDICINE

## 2019-01-03 PROCEDURE — 1036F TOBACCO NON-USER: CPT | Performed by: FAMILY MEDICINE

## 2019-01-03 PROCEDURE — 1101F PT FALLS ASSESS-DOCD LE1/YR: CPT | Performed by: FAMILY MEDICINE

## 2019-01-03 RX ORDER — ALPRAZOLAM 1 MG/1
TABLET ORAL
Qty: 60 TABLET | Refills: 0 | Status: SHIPPED | OUTPATIENT
Start: 2019-03-17 | End: 2019-04-16 | Stop reason: SDUPTHER

## 2019-01-03 RX ORDER — PANTOPRAZOLE SODIUM 20 MG/1
20 TABLET, DELAYED RELEASE ORAL DAILY
COMMUNITY

## 2019-01-03 RX ORDER — ALPRAZOLAM 1 MG/1
TABLET ORAL
Qty: 60 TABLET | Refills: 0 | Status: SHIPPED | OUTPATIENT
Start: 2019-01-16 | End: 2019-01-03 | Stop reason: SDUPTHER

## 2019-01-03 RX ORDER — ALPRAZOLAM 1 MG/1
TABLET ORAL
Qty: 60 TABLET | Refills: 0 | Status: SHIPPED | OUTPATIENT
Start: 2019-02-15 | End: 2019-01-03 | Stop reason: SDUPTHER

## 2019-01-03 RX ORDER — RANOLAZINE 1000 MG/1
1000 TABLET, EXTENDED RELEASE ORAL 2 TIMES DAILY
COMMUNITY

## 2019-01-03 ASSESSMENT — ENCOUNTER SYMPTOMS
SHORTNESS OF BREATH: 0
ABDOMINAL PAIN: 0

## 2019-01-07 LAB
6-ACETYLMORPHINE: NOT DETECTED
7-AMINOCLONAZEPAM: NOT DETECTED
ALPHA-OH-ALPRAZOLAM: PRESENT
ALPRAZOLAM: PRESENT
AMPHETAMINE: NOT DETECTED
BARBITURATES: NOT DETECTED
BENZOYLECGONINE: NOT DETECTED
BUPRENORPHINE: NOT DETECTED
CARISOPRODOL: NOT DETECTED
CLONAZEPAM: NOT DETECTED
CODEINE: NOT DETECTED
CREATININE URINE: 130 MG/DL (ref 20–400)
DIAZEPAM: NOT DETECTED
DRUGS EXPECTED: NORMAL
EER PAIN MGT DRUG PANEL, HIGH RES/EMIT U: NORMAL
ETHYL GLUCURONIDE: NOT DETECTED
FENTANYL: NOT DETECTED
HYDROCODONE: NOT DETECTED
HYDROMORPHONE: NOT DETECTED
LORAZEPAM: NOT DETECTED
MARIJUANA METABOLITE: NOT DETECTED
MDA: NOT DETECTED
MDEA: NOT DETECTED
MDMA URINE: NOT DETECTED
MEPERIDINE: NOT DETECTED
METHADONE: NOT DETECTED
METHAMPHETAMINE: NOT DETECTED
METHYLPHENIDATE: NOT DETECTED
MIDAZOLAM: NOT DETECTED
MORPHINE: NOT DETECTED
NORBUPRENORPHINE, FREE: NOT DETECTED
NORDIAZEPAM: NOT DETECTED
NORFENTANYL: NOT DETECTED
NORHYDROCODONE, URINE: NOT DETECTED
NOROXYCODONE: PRESENT
NOROXYMORPHONE, URINE: NOT DETECTED
OXAZEPAM: NOT DETECTED
OXYCODONE: PRESENT
OXYMORPHONE: NOT DETECTED
PAIN MANAGEMENT DRUG PANEL: NORMAL
PAIN MANAGEMENT DRUG PANEL: NORMAL
PCP: NOT DETECTED
PHENTERMINE: NOT DETECTED
PROPOXYPHENE: NOT DETECTED
TAPENTADOL, URINE: NOT DETECTED
TAPENTADOL-O-SULFATE, URINE: NOT DETECTED
TEMAZEPAM: NOT DETECTED
TRAMADOL: NOT DETECTED
ZOLPIDEM: NOT DETECTED

## 2019-01-09 ENCOUNTER — OFFICE VISIT (OUTPATIENT)
Dept: PAIN MANAGEMENT | Age: 84
End: 2019-01-09
Payer: MEDICARE

## 2019-01-09 VITALS
DIASTOLIC BLOOD PRESSURE: 71 MMHG | SYSTOLIC BLOOD PRESSURE: 153 MMHG | HEART RATE: 72 BPM | WEIGHT: 199 LBS | BODY MASS INDEX: 32.12 KG/M2

## 2019-01-09 DIAGNOSIS — M47.812 CERVICAL SPONDYLOSIS WITHOUT MYELOPATHY: ICD-10-CM

## 2019-01-09 DIAGNOSIS — M50.30 DEGENERATION OF CERVICAL INTERVERTEBRAL DISC: ICD-10-CM

## 2019-01-09 DIAGNOSIS — M17.11 PRIMARY OSTEOARTHRITIS OF RIGHT KNEE: ICD-10-CM

## 2019-01-09 DIAGNOSIS — G89.4 CHRONIC PAIN SYNDROME: ICD-10-CM

## 2019-01-09 DIAGNOSIS — M54.12 CERVICAL RADICULITIS: ICD-10-CM

## 2019-01-09 PROCEDURE — G8399 PT W/DXA RESULTS DOCUMENT: HCPCS | Performed by: INTERNAL MEDICINE

## 2019-01-09 PROCEDURE — 99213 OFFICE O/P EST LOW 20 MIN: CPT | Performed by: INTERNAL MEDICINE

## 2019-01-09 PROCEDURE — 1101F PT FALLS ASSESS-DOCD LE1/YR: CPT | Performed by: INTERNAL MEDICINE

## 2019-01-09 PROCEDURE — G8598 ASA/ANTIPLAT THER USED: HCPCS | Performed by: INTERNAL MEDICINE

## 2019-01-09 PROCEDURE — G8427 DOCREV CUR MEDS BY ELIG CLIN: HCPCS | Performed by: INTERNAL MEDICINE

## 2019-01-09 PROCEDURE — G8417 CALC BMI ABV UP PARAM F/U: HCPCS | Performed by: INTERNAL MEDICINE

## 2019-01-09 PROCEDURE — G8482 FLU IMMUNIZE ORDER/ADMIN: HCPCS | Performed by: INTERNAL MEDICINE

## 2019-01-09 PROCEDURE — 1090F PRES/ABSN URINE INCON ASSESS: CPT | Performed by: INTERNAL MEDICINE

## 2019-01-09 PROCEDURE — 1036F TOBACCO NON-USER: CPT | Performed by: INTERNAL MEDICINE

## 2019-01-09 PROCEDURE — 1123F ACP DISCUSS/DSCN MKR DOCD: CPT | Performed by: INTERNAL MEDICINE

## 2019-01-09 PROCEDURE — 4040F PNEUMOC VAC/ADMIN/RCVD: CPT | Performed by: INTERNAL MEDICINE

## 2019-01-09 RX ORDER — OXYCODONE AND ACETAMINOPHEN 7.5; 325 MG/1; MG/1
1 TABLET ORAL EVERY 6 HOURS PRN
Qty: 90 TABLET | Refills: 0 | Status: SHIPPED | OUTPATIENT
Start: 2019-01-09 | End: 2019-03-20 | Stop reason: SDUPTHER

## 2019-01-09 RX ORDER — PREGABALIN 50 MG/1
CAPSULE ORAL
Qty: 270 CAPSULE | Refills: 1 | Status: SHIPPED | OUTPATIENT
Start: 2019-01-09 | End: 2019-03-20 | Stop reason: SDUPTHER

## 2019-01-17 ENCOUNTER — TELEPHONE (OUTPATIENT)
Dept: PAIN MANAGEMENT | Age: 84
End: 2019-01-17

## 2019-01-19 ENCOUNTER — APPOINTMENT (OUTPATIENT)
Dept: CT IMAGING | Age: 84
End: 2019-01-19
Payer: MEDICARE

## 2019-01-19 ENCOUNTER — HOSPITAL ENCOUNTER (EMERGENCY)
Age: 84
Discharge: HOME | End: 2019-01-19
Attending: EMERGENCY MEDICINE
Payer: MEDICARE

## 2019-01-19 VITALS
WEIGHT: 202.38 LBS | HEIGHT: 66 IN | DIASTOLIC BLOOD PRESSURE: 68 MMHG | SYSTOLIC BLOOD PRESSURE: 126 MMHG | BODY MASS INDEX: 32.53 KG/M2 | OXYGEN SATURATION: 94 % | RESPIRATION RATE: 19 BRPM | TEMPERATURE: 98.1 F | HEART RATE: 83 BPM

## 2019-01-19 DIAGNOSIS — S22.41XD CLOSED FRACTURE OF MULTIPLE RIBS OF RIGHT SIDE WITH ROUTINE HEALING, SUBSEQUENT ENCOUNTER: Primary | ICD-10-CM

## 2019-01-19 PROCEDURE — 6370000000 HC RX 637 (ALT 250 FOR IP): Performed by: EMERGENCY MEDICINE

## 2019-01-19 PROCEDURE — 99284 EMERGENCY DEPT VISIT MOD MDM: CPT

## 2019-01-19 PROCEDURE — 70450 CT HEAD/BRAIN W/O DYE: CPT

## 2019-01-19 PROCEDURE — 6360000002 HC RX W HCPCS: Performed by: EMERGENCY MEDICINE

## 2019-01-19 PROCEDURE — 96374 THER/PROPH/DIAG INJ IV PUSH: CPT

## 2019-01-19 PROCEDURE — 71250 CT THORAX DX C-: CPT

## 2019-01-19 RX ORDER — MORPHINE SULFATE 2 MG/ML
2 INJECTION, SOLUTION INTRAMUSCULAR; INTRAVENOUS
Status: DISCONTINUED | OUTPATIENT
Start: 2019-01-19 | End: 2019-01-19

## 2019-01-19 RX ORDER — OXYCODONE HYDROCHLORIDE AND ACETAMINOPHEN 5; 325 MG/1; MG/1
1 TABLET ORAL ONCE
Status: COMPLETED | OUTPATIENT
Start: 2019-01-19 | End: 2019-01-19

## 2019-01-19 RX ORDER — MORPHINE SULFATE 2 MG/ML
2 INJECTION, SOLUTION INTRAMUSCULAR; INTRAVENOUS ONCE
Status: COMPLETED | OUTPATIENT
Start: 2019-01-19 | End: 2019-01-19

## 2019-01-19 RX ORDER — LIDOCAINE 50 MG/G
1 PATCH TOPICAL DAILY
Qty: 30 PATCH | Refills: 0 | Status: SHIPPED | OUTPATIENT
Start: 2019-01-19 | End: 2019-02-18

## 2019-01-19 RX ADMIN — OXYCODONE AND ACETAMINOPHEN 1 TABLET: 5; 325 TABLET ORAL at 10:36

## 2019-01-19 RX ADMIN — MORPHINE SULFATE 2 MG: 2 INJECTION, SOLUTION INTRAMUSCULAR; INTRAVENOUS at 11:25

## 2019-01-19 ASSESSMENT — PAIN DESCRIPTION - LOCATION
LOCATION: RIB CAGE
LOCATION: RIB CAGE

## 2019-01-19 ASSESSMENT — PAIN SCALES - GENERAL
PAINLEVEL_OUTOF10: 5
PAINLEVEL_OUTOF10: 10
PAINLEVEL_OUTOF10: 5

## 2019-01-19 ASSESSMENT — PAIN - FUNCTIONAL ASSESSMENT: PAIN_FUNCTIONAL_ASSESSMENT: 0-10

## 2019-01-19 ASSESSMENT — PAIN DESCRIPTION - PROGRESSION: CLINICAL_PROGRESSION: NOT CHANGED

## 2019-01-19 ASSESSMENT — PAIN DESCRIPTION - DESCRIPTORS: DESCRIPTORS: STABBING

## 2019-01-19 ASSESSMENT — PAIN DESCRIPTION - PAIN TYPE
TYPE: ACUTE PAIN
TYPE: ACUTE PAIN

## 2019-01-19 ASSESSMENT — PAIN DESCRIPTION - ORIENTATION: ORIENTATION: RIGHT

## 2019-01-23 ENCOUNTER — TELEPHONE (OUTPATIENT)
Dept: FAMILY MEDICINE CLINIC | Age: 84
End: 2019-01-23

## 2019-01-29 ENCOUNTER — OFFICE VISIT (OUTPATIENT)
Dept: FAMILY MEDICINE CLINIC | Age: 84
End: 2019-01-29
Payer: MEDICARE

## 2019-01-29 VITALS
BODY MASS INDEX: 31.98 KG/M2 | WEIGHT: 199 LBS | DIASTOLIC BLOOD PRESSURE: 70 MMHG | HEIGHT: 66 IN | SYSTOLIC BLOOD PRESSURE: 108 MMHG

## 2019-01-29 DIAGNOSIS — R07.89 CHEST WALL PAIN: Primary | ICD-10-CM

## 2019-01-29 PROCEDURE — 1090F PRES/ABSN URINE INCON ASSESS: CPT | Performed by: FAMILY MEDICINE

## 2019-01-29 PROCEDURE — 99213 OFFICE O/P EST LOW 20 MIN: CPT | Performed by: FAMILY MEDICINE

## 2019-01-29 PROCEDURE — G8598 ASA/ANTIPLAT THER USED: HCPCS | Performed by: FAMILY MEDICINE

## 2019-01-29 PROCEDURE — G8427 DOCREV CUR MEDS BY ELIG CLIN: HCPCS | Performed by: FAMILY MEDICINE

## 2019-01-29 PROCEDURE — G8399 PT W/DXA RESULTS DOCUMENT: HCPCS | Performed by: FAMILY MEDICINE

## 2019-01-29 PROCEDURE — 1036F TOBACCO NON-USER: CPT | Performed by: FAMILY MEDICINE

## 2019-01-29 PROCEDURE — 1123F ACP DISCUSS/DSCN MKR DOCD: CPT | Performed by: FAMILY MEDICINE

## 2019-01-29 PROCEDURE — G8417 CALC BMI ABV UP PARAM F/U: HCPCS | Performed by: FAMILY MEDICINE

## 2019-01-29 PROCEDURE — 4040F PNEUMOC VAC/ADMIN/RCVD: CPT | Performed by: FAMILY MEDICINE

## 2019-01-29 PROCEDURE — G8482 FLU IMMUNIZE ORDER/ADMIN: HCPCS | Performed by: FAMILY MEDICINE

## 2019-01-29 PROCEDURE — 1101F PT FALLS ASSESS-DOCD LE1/YR: CPT | Performed by: FAMILY MEDICINE

## 2019-02-08 RX ORDER — LEVOTHYROXINE SODIUM 0.1 MG/1
TABLET ORAL
Qty: 90 TABLET | Refills: 2 | Status: SHIPPED | OUTPATIENT
Start: 2019-02-08 | End: 2019-01-01 | Stop reason: SDUPTHER

## 2019-03-06 ASSESSMENT — ENCOUNTER SYMPTOMS
SHORTNESS OF BREATH: 0
ABDOMINAL PAIN: 0

## 2019-03-20 ENCOUNTER — OFFICE VISIT (OUTPATIENT)
Dept: PAIN MANAGEMENT | Age: 84
End: 2019-03-20
Payer: MEDICARE

## 2019-03-20 VITALS
WEIGHT: 198 LBS | SYSTOLIC BLOOD PRESSURE: 145 MMHG | HEART RATE: 66 BPM | BODY MASS INDEX: 31.96 KG/M2 | DIASTOLIC BLOOD PRESSURE: 69 MMHG

## 2019-03-20 DIAGNOSIS — M50.30 DEGENERATION OF CERVICAL INTERVERTEBRAL DISC: ICD-10-CM

## 2019-03-20 DIAGNOSIS — M54.12 CERVICAL RADICULITIS: ICD-10-CM

## 2019-03-20 DIAGNOSIS — M47.812 CERVICAL SPONDYLOSIS WITHOUT MYELOPATHY: ICD-10-CM

## 2019-03-20 DIAGNOSIS — G50.0 TRIGEMINAL NEURALGIA: ICD-10-CM

## 2019-03-20 DIAGNOSIS — G89.4 CHRONIC PAIN SYNDROME: ICD-10-CM

## 2019-03-20 DIAGNOSIS — M17.11 PRIMARY OSTEOARTHRITIS OF RIGHT KNEE: ICD-10-CM

## 2019-03-20 PROCEDURE — 1090F PRES/ABSN URINE INCON ASSESS: CPT | Performed by: INTERNAL MEDICINE

## 2019-03-20 PROCEDURE — G8482 FLU IMMUNIZE ORDER/ADMIN: HCPCS | Performed by: INTERNAL MEDICINE

## 2019-03-20 PROCEDURE — G8427 DOCREV CUR MEDS BY ELIG CLIN: HCPCS | Performed by: INTERNAL MEDICINE

## 2019-03-20 PROCEDURE — 99213 OFFICE O/P EST LOW 20 MIN: CPT | Performed by: INTERNAL MEDICINE

## 2019-03-20 PROCEDURE — 1101F PT FALLS ASSESS-DOCD LE1/YR: CPT | Performed by: INTERNAL MEDICINE

## 2019-03-20 PROCEDURE — 1123F ACP DISCUSS/DSCN MKR DOCD: CPT | Performed by: INTERNAL MEDICINE

## 2019-03-20 PROCEDURE — 4040F PNEUMOC VAC/ADMIN/RCVD: CPT | Performed by: INTERNAL MEDICINE

## 2019-03-20 PROCEDURE — G8417 CALC BMI ABV UP PARAM F/U: HCPCS | Performed by: INTERNAL MEDICINE

## 2019-03-20 PROCEDURE — G8399 PT W/DXA RESULTS DOCUMENT: HCPCS | Performed by: INTERNAL MEDICINE

## 2019-03-20 PROCEDURE — G8598 ASA/ANTIPLAT THER USED: HCPCS | Performed by: INTERNAL MEDICINE

## 2019-03-20 PROCEDURE — 1036F TOBACCO NON-USER: CPT | Performed by: INTERNAL MEDICINE

## 2019-03-20 RX ORDER — OXYCODONE AND ACETAMINOPHEN 7.5; 325 MG/1; MG/1
1 TABLET ORAL EVERY 6 HOURS PRN
Qty: 70 TABLET | Refills: 0 | Status: SHIPPED | OUTPATIENT
Start: 2019-03-20 | End: 2019-05-15 | Stop reason: SDUPTHER

## 2019-03-20 RX ORDER — PREGABALIN 50 MG/1
CAPSULE ORAL
Qty: 270 CAPSULE | Refills: 1 | Status: SHIPPED | OUTPATIENT
Start: 2019-03-20 | End: 2019-05-15 | Stop reason: SDUPTHER

## 2019-04-16 ENCOUNTER — OFFICE VISIT (OUTPATIENT)
Dept: FAMILY MEDICINE CLINIC | Age: 84
End: 2019-04-16
Payer: MEDICARE

## 2019-04-16 VITALS
SYSTOLIC BLOOD PRESSURE: 104 MMHG | BODY MASS INDEX: 31.5 KG/M2 | WEIGHT: 196 LBS | TEMPERATURE: 98.4 F | DIASTOLIC BLOOD PRESSURE: 64 MMHG | HEIGHT: 66 IN

## 2019-04-16 DIAGNOSIS — F41.9 ANXIETY: ICD-10-CM

## 2019-04-16 DIAGNOSIS — F51.01 PRIMARY INSOMNIA: Primary | ICD-10-CM

## 2019-04-16 DIAGNOSIS — M81.0 POSTMENOPAUSAL OSTEOPOROSIS: ICD-10-CM

## 2019-04-16 LAB
A/G RATIO: 1.3 (ref 1.1–2.2)
ALBUMIN SERPL-MCNC: 3.7 G/DL (ref 3.4–5)
ALP BLD-CCNC: 129 U/L (ref 40–129)
ALT SERPL-CCNC: 10 U/L (ref 10–40)
ANION GAP SERPL CALCULATED.3IONS-SCNC: 11 MMOL/L (ref 3–16)
AST SERPL-CCNC: 19 U/L (ref 15–37)
BILIRUB SERPL-MCNC: 0.3 MG/DL (ref 0–1)
BUN BLDV-MCNC: 10 MG/DL (ref 7–20)
CALCIUM SERPL-MCNC: 8.2 MG/DL (ref 8.3–10.6)
CHLORIDE BLD-SCNC: 99 MMOL/L (ref 99–110)
CHOLESTEROL, TOTAL: 187 MG/DL (ref 0–199)
CO2: 29 MMOL/L (ref 21–32)
CREAT SERPL-MCNC: 0.9 MG/DL (ref 0.6–1.2)
CREATININE URINE: 197.1 MG/DL (ref 28–259)
GFR AFRICAN AMERICAN: >60
GFR NON-AFRICAN AMERICAN: 60
GLOBULIN: 2.9 G/DL
GLUCOSE BLD-MCNC: 156 MG/DL (ref 70–99)
HDLC SERPL-MCNC: 42 MG/DL (ref 40–60)
LDL CHOLESTEROL CALCULATED: ABNORMAL MG/DL
LDL CHOLESTEROL DIRECT: 60 MG/DL
MICROALBUMIN UR-MCNC: <1.2 MG/DL
MICROALBUMIN/CREAT UR-RTO: NORMAL MG/G (ref 0–30)
POTASSIUM SERPL-SCNC: 4.4 MMOL/L (ref 3.5–5.1)
SODIUM BLD-SCNC: 139 MMOL/L (ref 136–145)
TOTAL PROTEIN: 6.6 G/DL (ref 6.4–8.2)
TRIGL SERPL-MCNC: 405 MG/DL (ref 0–150)
VLDLC SERPL CALC-MCNC: ABNORMAL MG/DL

## 2019-04-16 PROCEDURE — 1036F TOBACCO NON-USER: CPT | Performed by: FAMILY MEDICINE

## 2019-04-16 PROCEDURE — 99215 OFFICE O/P EST HI 40 MIN: CPT | Performed by: FAMILY MEDICINE

## 2019-04-16 PROCEDURE — 1123F ACP DISCUSS/DSCN MKR DOCD: CPT | Performed by: FAMILY MEDICINE

## 2019-04-16 PROCEDURE — G8598 ASA/ANTIPLAT THER USED: HCPCS | Performed by: FAMILY MEDICINE

## 2019-04-16 PROCEDURE — 4040F PNEUMOC VAC/ADMIN/RCVD: CPT | Performed by: FAMILY MEDICINE

## 2019-04-16 PROCEDURE — G8399 PT W/DXA RESULTS DOCUMENT: HCPCS | Performed by: FAMILY MEDICINE

## 2019-04-16 PROCEDURE — G8417 CALC BMI ABV UP PARAM F/U: HCPCS | Performed by: FAMILY MEDICINE

## 2019-04-16 PROCEDURE — 1090F PRES/ABSN URINE INCON ASSESS: CPT | Performed by: FAMILY MEDICINE

## 2019-04-16 PROCEDURE — G8428 CUR MEDS NOT DOCUMENT: HCPCS | Performed by: FAMILY MEDICINE

## 2019-04-16 PROCEDURE — G0438 PPPS, INITIAL VISIT: HCPCS | Performed by: FAMILY MEDICINE

## 2019-04-16 RX ORDER — ALPRAZOLAM 1 MG/1
TABLET ORAL
Qty: 60 TABLET | Refills: 0 | Status: SHIPPED | OUTPATIENT
Start: 2019-05-27 | End: 2019-04-16 | Stop reason: SDUPTHER

## 2019-04-16 RX ORDER — ALPRAZOLAM 1 MG/1
TABLET ORAL
Qty: 60 TABLET | Refills: 0 | Status: SHIPPED | OUTPATIENT
Start: 2019-06-26 | End: 2019-07-16 | Stop reason: SDUPTHER

## 2019-04-16 RX ORDER — ALPRAZOLAM 1 MG/1
TABLET ORAL
Qty: 60 TABLET | Refills: 0 | Status: SHIPPED | OUTPATIENT
Start: 2019-04-27 | End: 2019-04-16 | Stop reason: SDUPTHER

## 2019-04-16 ASSESSMENT — PATIENT HEALTH QUESTIONNAIRE - PHQ9
SUM OF ALL RESPONSES TO PHQ9 QUESTIONS 1 & 2: 0
1. LITTLE INTEREST OR PLEASURE IN DOING THINGS: 0
2. FEELING DOWN, DEPRESSED OR HOPELESS: 0
SUM OF ALL RESPONSES TO PHQ QUESTIONS 1-9: 0
SUM OF ALL RESPONSES TO PHQ QUESTIONS 1-9: 0

## 2019-04-16 ASSESSMENT — ENCOUNTER SYMPTOMS
BLOOD IN STOOL: 0
NAUSEA: 0
DIARRHEA: 0
COUGH: 1
SHORTNESS OF BREATH: 1
COLOR CHANGE: 0
VOMITING: 0
CONSTIPATION: 0
SINUS PRESSURE: 0
SINUS PAIN: 0
WHEEZING: 1
TROUBLE SWALLOWING: 0

## 2019-04-16 NOTE — PROGRESS NOTES
Subjective:      Patient ID: Neto De La Vega is a 81 y.o. female. Chief Complaint   Patient presents with    Annual Exam     Here for a yearly exam, pt is fasting       HPI     Ceec Mosqueda is an 81year old female with COPD who presented to the clinic for an annual physical. She has no new complaints or issues. She did not get labs before this appointment, but was fasting and will get them after. She is also here for a medication check on her xanax. Took last xanax pill last night (4/15/19)  1/7/19= last urine drug screen  Pill count=22 and patient agrees  Bottle date= 3/27/19       Review of Systems   Constitutional: Negative for fever. HENT: Negative for congestion, sinus pressure, sinus pain and trouble swallowing. Eyes: Negative for visual disturbance. Respiratory: Positive for cough, shortness of breath and wheezing. Pulmonologist-- Last seen in jan 2019 monitoring COPD and will see him again in June  States she is doing okay with her breathing but is coughing  and wheezing more because of weather. Usually lung doctor will increase steroid during the spring   Gastrointestinal: Negative for blood in stool, constipation, diarrhea, nausea and vomiting. Genitourinary: Negative for frequency and hematuria. Skin: Negative for color change. Neurological: Positive for dizziness. Negative for syncope and headaches. She complains of dizziness but the cardiologist is managing this and recently cut norvasc in half (now 2.5mg)       Psychiatric/Behavioral: Negative for agitation and behavioral problems.         Patient Active Problem List   Diagnosis    Sprain of neck    Sciatica    Cervical spondylosis without myelopathy    Degeneration of cervical intervertebral disc    Cervical radiculitis    Chronic pain syndrome    Stroke ([address]    Postmenopausal    COPD, severe ([address]    HTN (hypertension)    Acquired hypothyroidism    Hypertriglyceridemia    Itching    Anxiety    Coronary artery disease involving native coronary artery of native heart with angina pectoris (HCC)    Rash    Primary osteoarthritis of right knee    Age-related osteoporosis without current pathological fracture    Uncontrolled type 2 diabetes mellitus without complication, without long-term current use of insulin ([facility]     Family History   Problem Relation Age of Onset    Cancer Mother     Tuberculosis Father     Cancer Sister     Heart Disease Brother     Cancer Brother     Heart Attack Brother      Past Surgical History:   Procedure Laterality Date    CATARACT REMOVAL      CHOLECYSTECTOMY       Social History     Socioeconomic History    Marital status:       Spouse name: Not on file    Number of children: 1    Years of education: Not on file    Highest education level: Not on file   Occupational History    Not on file   Social Needs    Financial resource strain: Not on file    Food insecurity:     Worry: Not on file     Inability: Not on file    Transportation needs:     Medical: Not on file     Non-medical: Not on file   Tobacco Use    Smoking status: Former Smoker     Last attempt to quit: 1982     Years since quittin.4    Smokeless tobacco: Never Used   Substance and Sexual Activity    Alcohol use: No     Alcohol/week: 0.0 oz    Drug use: No    Sexual activity: Not on file   Lifestyle    Physical activity:     Days per week: Not on file     Minutes per session: Not on file    Stress: Not on file   Relationships    Social connections:     Talks on phone: Not on file     Gets together: Not on file     Attends Bahai service: Not on file     Active member of club or organization: Not on file     Attends meetings of clubs or organizations: Not on file     Relationship status: Not on file    Intimate partner violence:     Fear of current or ex partner: Not on file     Emotionally abused: Not on file     Physically abused: Not on file     Forced sexual activity: Not on file   Other Topics Concern    Not on file   Social History Narrative    Not on file           Objective:   Physical Exam   Constitutional: She is oriented to person, place, and time. She appears well-developed and well-nourished. No distress. HENT:   Head: Normocephalic and atraumatic. Right Ear: External ear normal.   Left Ear: External ear normal.   Nose: Nose normal.   Mouth/Throat: Oropharynx is clear and moist. No oropharyngeal exudate. Eyes: Pupils are equal, round, and reactive to light. Conjunctivae and EOM are normal. Right eye exhibits no discharge. Left eye exhibits no discharge. Neck: Normal range of motion. Neck supple. No thyromegaly present. Cardiovascular: Normal rate, regular rhythm and normal heart sounds. Pulmonary/Chest: She has wheezes. She has rales. Abdominal: Soft. Bowel sounds are normal. She exhibits no distension and no mass. There is no tenderness. There is no guarding. Musculoskeletal: Normal range of motion. Lymphadenopathy:     She has no cervical adenopathy. Neurological: She is alert and oriented to person, place, and time. Skin: Skin is warm. She is not diaphoretic. Psychiatric: She has a normal mood and affect. Her behavior is normal.        Vitals:    04/16/19 1002   BP: 104/64   Temp: 98.4 °F (36.9 °C)   Pulse ox on room air=92%    Body mass index is 31.64 kg/m². Allergies   Allergen Reactions    Acetaminophen-Codeine Nausea Only    Codeine      Current Outpatient Medications   Medication Sig Dispense Refill    [START ON 6/26/2019] ALPRAZolam (XANAX) 1 MG tablet TAKE ONE TABLET BY MOUTH TWICE A DAY AS NEEDED FOR ANXIETY. NO ACOHOL. NO DRIVING. Do not take with percocet.  60 tablet 0    pregabalin (LYRICA) 50 MG capsule 1 tab AM 2 tabs PM. 270 capsule 1    diclofenac sodium (VOLTAREN) 1 % GEL Apply 2-4 grams to right knee TID 5 Tube 1    levothyroxine (SYNTHROID) 100 MCG tablet TAKE ONE TABLET BY MOUTH DAILY 90 tablet 2    pantoprazole (METAMUCIL SMOOTH TEXTURE) 28 % packet Take 1 packet by mouth 2 times daily. Take with full glass of h20 or juice        Current Facility-Administered Medications   Medication Dose Route Frequency Provider Last Rate Last Dose    cyanocobalamin injection 1,000 mcg  1,000 mcg Intramuscular Once Anell Angelica, DO        triamcinolone acetonide (KENALOG-40) injection 40 mg  40 mg Intramuscular Once Luis Deleon MD         Lab Review   No visits with results within 2 Month(s) from this visit.    Latest known visit with results is:   Office Visit on 01/03/2019   Component Date Value    Drugs Expected 01/03/2019 See Interp     Pain Management Drug Pan* 01/03/2019 Inconsistent     Creatinine, Ur 01/03/2019 130.0     Codeine 01/03/2019 Not Detected     Morphine 01/03/2019 Not Detected     6-Acetylmorphine 01/03/2019 Not Detected     Oxycodone 01/03/2019 Present     Noroxycodone 01/03/2019 Present     Oxymorphone 01/03/2019 Not Detected     NOROXYMORPHONE, URINE 01/03/2019 Not Detected     Hydrocodone 01/03/2019 Not Detected     NORHYDROCODONE, URINE 01/03/2019 Not Detected     Hydromorphone 01/03/2019 Not Detected     Buprenorphine 01/03/2019 Not Detected     Norbuprenorphine 01/03/2019 Not Detected     Fentanyl 01/03/2019 Not Detected     Norfentanyl 01/03/2019 Not Detected     Meperidine 01/03/2019 Not Detected     Tapentadol, Urine 01/03/2019 Not Detected     Tapentadol-O-Sulfate, Ur* 01/03/2019 Not Detected     Methadone 01/03/2019 Not Detected     Propoxyphene 01/03/2019 Not Detected     Tramadol 01/03/2019 Not Detected     Amphetamine 01/03/2019 Not Detected     Methamphetamine 01/03/2019 Not Detected     MDMA, Urine 01/03/2019 Not Detected     MDA 01/03/2019 Not Detected     MDEA 01/03/2019 Not Detected     Methylphenidate 01/03/2019 Not Detected     Phentermine 01/03/2019 Not Detected     Benzoylecgonine 01/03/2019 Not Detected     Alprazolam 01/03/2019 Present     Alpha-OH-alprazolam 01/03/2019 Present     Clonazepam 01/03/2019 Not Detected     7-aminoclonazepam 01/03/2019 Not Detected     Diazepam 01/03/2019 Not Detected     NORDIAZEPAM 01/03/2019 Not Detected     OXAZEPAM 01/03/2019 Not Detected     TEMAZEPAM 01/03/2019 Not Detected     Lorazepam 01/03/2019 Not Detected     Midazolam 01/03/2019 Not Detected     Zolpidem 01/03/2019 Not Detected     Barbiturates 01/03/2019 Not Detected     Ethyl Glucuronide 01/03/2019 Not Detected     Marijuana Metabolite 01/03/2019 Not Detected     PCP 01/03/2019 Not Detected     CARISOPRODOL 01/03/2019 Not Detected     Pain Management Drug Pan* 01/03/2019 See Below     [facility] Pain Mgt Drug Panel,* 01/03/2019 See Note            Assessment:         Diagnosis Orders   1. Primary insomnia  ALPRAZolam (XANAX) 1 MG tablet    DISCONTINUED: ALPRAZolam (XANAX) 1 MG tablet    DISCONTINUED: ALPRAZolam (XANAX) 1 MG tablet   2. Anxiety     3. Postmenopausal osteoporosis  DEXA BONE DENSITY AXIAL SKELETON   4. BMI 31.0-31.9,adult             Plan:           Return in about 3 months (around 7/16/2019) for medication check. She will get lab work done after this appointment. We discussed her medications including the xanax. She asked if there was anything else that she could take for sleep and we discussed that this is contraindicated in patients over 65. She is not having any problems with her current meds. We also discussed that she has been taking miralax daily for the last 2 months. She was concerned about this because the bottle states not to take it past 7 days. We explained that the manufacturers put this disclaimer on the package because they can't be sure what the reason for you taking the med is. She was reassured that taking this daily is not a problem. We discussed her COPD and her breathing. She knows she needs to follow up with her pulmonologist because she is starting to wheeze more.       Her last dexa scan was in . She was given an order for another DEXA scan. She knows she needs a new one so that we can monitor the progression of her osteoporosis. She was also given a handout about the new shingles vaccine. She will also get on the waiting list to receive the vaccine at the pharmacy. New meds this visit:    Orders Placed This Encounter   Medications    DISCONTD: ALPRAZolam (XANAX) 1 MG tablet     Sig: TAKE ONE TABLET BY MOUTH TWICE A DAY AS NEEDED FOR ANXIETY. NO ACOHOL. NO DRIVING. Do not take with percocet. Dispense:  60 tablet     Refill:  0     Do not refill before 19    DISCONTD: ALPRAZolam (XANAX) 1 MG tablet     Sig: TAKE ONE TABLET BY MOUTH TWICE A DAY AS NEEDED FOR ANXIETY. NO ACOHOL. NO DRIVING. Do not take with percocet. Dispense:  60 tablet     Refill:  0     Do not refill before 19    ALPRAZolam (XANAX) 1 MG tablet     Sig: TAKE ONE TABLET BY MOUTH TWICE A DAY AS NEEDED FOR ANXIETY. NO ACOHOL. NO DRIVING. Do not take with percocet. Dispense:  60 tablet     Refill:  0     Do not refill before 19       New orders placed this visit:    Orders Placed This Encounter   Procedures    DEXA BONE DENSITY AXIAL SKELETON     Standing Status:   Future     Number of Occurrences:   1     Standing Expiration Date:   2020       Return in about 3 months (around 2019) for medication check. continue with the following meds:    Outpatient Encounter Medications as of 2019   Medication Sig Dispense Refill    [START ON 2019] ALPRAZolam (XANAX) 1 MG tablet TAKE ONE TABLET BY MOUTH TWICE A DAY AS NEEDED FOR ANXIETY. NO ACOHOL. NO DRIVING. Do not take with percocet.  60 tablet 0    pregabalin (LYRICA) 50 MG capsule 1 tab AM 2 tabs PM. 270 capsule 1    diclofenac sodium (VOLTAREN) 1 % GEL Apply 2-4 grams to right knee TID 5 Tube 1    [] oxyCODONE-acetaminophen (PERCOCET) 7.5-325 MG per tablet Take 1 tablet by mouth every 6 hours as needed for Pain for up to 28 days. Max 2-3 PER DAY 70 tablet 0    levothyroxine (SYNTHROID) 100 MCG tablet TAKE ONE TABLET BY MOUTH DAILY 90 tablet 2    pantoprazole (PROTONIX) 20 MG tablet Take 20 mg by mouth daily      ranolazine (RANEXA) 500 MG extended release tablet Take 500 mg by mouth 2 times daily      vitamin D (ERGOCALCIFEROL) 22880 units CAPS capsule TAKE ONE CAPSULE BY MOUTH ONCE WEEKLY 4 capsule 4    atorvastatin (LIPITOR) 80 MG tablet Take 80 mg by mouth daily      amLODIPine (NORVASC) 5 MG tablet Take 2.5 mg by mouth daily       carBAMazepine (TEGRETOL-XR) 100 MG extended release tablet Take 1 tablet by mouth 2 times daily (Patient taking differently: Take 300 mg by mouth 3 times daily Indications: Taking 800 mg daily, 300mg, 200mg, 300mg ) 100 tablet 3    allopurinol (ZYLOPRIM) 300 MG tablet Take 300 mg by mouth      Respiratory Therapy Supplies (NEBULIZER COMPRESSOR) KIT 1 kit by Does not apply route once      Roflumilast (DALIRESP) 500 MCG tablet Take 500 mcg by mouth daily      isosorbide mononitrate (IMDUR) 30 MG CR tablet Take 1 tablet by mouth daily. (Patient taking differently: Take 30 mg by mouth 2 times daily.) 90 tablet 0    nitroGLYCERIN (NITROSTAT) 0.4 MG SL tablet 1 SL q 5 min prn chest pain. After 3 tabs, if pain persists, go to ER. 25 tablet 0    azithromycin (ZITHROMAX) 500 MG tablet Take 500 mg by mouth. [id number]      metoprolol (LOPRESSOR) 25 MG tablet Take 50 mg by mouth 2 times daily.  PREDNISONE Take 10 mg by mouth daily at 1800       tiotropium (SPIRIVA) 18 MCG inhalation capsule Inhale 18 mcg into the lungs daily.  montelukast (SINGULAIR) 10 MG tablet Take 10 mg by mouth nightly.  albuterol (PROVENTIL HFA;VENTOLIN HFA) 108 (90 BASE) MCG/ACT inhaler Inhale 2 puffs into the lungs every 6 hours as needed.  Potassium Chloride (KLOR-CON PO) Take 20 mEq by mouth daily       fluticasone-salmeterol (ADVAIR DISKUS) 250-50 MCG/DOSE AEPB Inhale 1 puff into the lungs 2 times daily.  clopidogrel (PLAVIX) 75 MG tablet Take 75 mg by mouth daily.  aspirin 81 MG EC tablet Take 81 mg by mouth daily.  [DISCONTINUED] ALPRAZolam (XANAX) 1 MG tablet TAKE ONE TABLET BY MOUTH TWICE A DAY AS NEEDED FOR ANXIETY. NO ACOHOL. NO DRIVING. Do not take with percocet. 60 tablet 0    [DISCONTINUED] ALPRAZolam (XANAX) 1 MG tablet TAKE ONE TABLET BY MOUTH TWICE A DAY AS NEEDED FOR ANXIETY. NO ACOHOL. NO DRIVING. Do not take with percocet. 60 tablet 0    [DISCONTINUED] ALPRAZolam (XANAX) 1 MG tablet TAKE ONE TABLET BY MOUTH TWICE A DAY AS NEEDED FOR ANXIETY. NO ACOHOL. NO DRIVING. Do not take with percocet. . 60 tablet 0    psyllium (METAMUCIL SMOOTH TEXTURE) 28 % packet Take 1 packet by mouth 2 times daily.  Take with full glass of h20 or juice        Facility-Administered Encounter Medications as of 4/16/2019   Medication Dose Route Frequency Provider Last Rate Last Dose    cyanocobalamin injection 1,000 mcg  1,000 mcg Intramuscular Once Thomas Noyola DO        triamcinolone acetonide (KENALOG-40) injection 40 mg  40 mg Intramuscular Once Deonna Bolaños MD           Quality & Risk Score Accuracy    Last edited 04/16/19 12:14 EDT by DO Thomas [place] Jamie, DO

## 2019-04-16 NOTE — PATIENT INSTRUCTIONS
Please call your pharmacy if you need any refills of your medication(s). Please call our office at [phone] if you don't hear from us about your test results. Please be sure to call our office if your illness/problem has been treated for but has not completely resolved. Bring an accurate list of your medications with you at every appointment to ensure that we have the correct information. Our office hours are: Monday - Friday 7 am- 5 pm; Saturdays vary on doctor working. Phone lines turn on at 8 am.    Patient Education        Recombinant Zoster (Shingles) Vaccine, RZV: What you need to know  Why get vaccinated? Shingles (also called herpes zoster, or just zoster) is a painful skin rash, often with blisters. Shingles is caused by the varicella zoster virus, the same virus that causes chickenpox. After you have chickenpox, the virus stays in your body and can cause shingles later in life. You can't catch shingles from another person. However, a person who has never had chickenpox (or chickenpox vaccine) could get chickenpox from someone with shingles. A shingles rash usually appears on one side of the face or body and heals within 2 to 4 weeks. Its main symptom is pain, which can be severe. Other symptoms can include fever, headache, chills, and upset stomach. Very rarely, a shingles infection can lead to pneumonia, hearing problems, blindness, brain inflammation (encephalitis), or death. For about 1 person in 5, severe pain can continue even long after the rash has cleared up. This long-lasting pain is called post-herpetic neuralgia (PHN). Shingles is far more common in people 48years of age and older than in younger people, and the risk increases with age. It is also more common in people whose immune system is weakened because of a disease such as cancer, or by drugs such as steroids or chemotherapy.  At least 1 million people a year in the [facility] get shingles. Shingles vaccine (recombinant)  Recombinant shingles vaccine was approved by FDA in 2017 for the prevention of shingles. In clinical trials, it was more than 90% effective in preventing shingles. It can also reduce the likelihood of PHN. Two doses, 2 to 6 months apart, are recommended for adults 48 and older. This vaccine is also recommended for people who have already gotten the live shingles vaccine (Zostavax). There is no live virus in this vaccine. Some people should not get this vaccine  Tell your vaccine provider if you:  · Have any severe, life-threatening allergies. A person who has ever had a life-threatening allergic reaction after a dose of recombinant shingles vaccine, or has a severe allergy to any component of this vaccine, may be advised not to be vaccinated. Ask your health care provider if you want information about vaccine components. · Are pregnant or breastfeeding. There is not much information about use of recombinant shingles vaccine in pregnant or nursing women. Your healthcare provider might recommend delaying vaccination. · Are not feeling well. If you have a mild illness, such as a cold, you can probably get the vaccine today. If you are moderately or severely ill, you should probably wait until you recover. Your doctor can advise you. Risks of a vaccine reaction  With any medicine, including vaccines, there is a chance of reactions. After recombinant shingles vaccination, a person might experience:  · Pain, redness, soreness, or swelling at the site of the injection  · Headache, muscle aches, fever, shivering, fatigue  In clinical trials, most people got a sore arm with mild or moderate pain after vaccination, and some also had redness and swelling where they got the shot. Some people felt tired, had muscle pain, a headache, shivering, fever, stomach pain, or nausea.  About 1 out of 6 people who got recombinant zoster vaccine experienced side effects that prevented them from doing regular activities. Symptoms went away on their own in about 2 to 3 days. Side effects were more common in younger people. You should still get the second dose of recombinant zoster vaccine even if you had one of these reactions after the first dose. Other things that could happen after this vaccine:  · People sometimes faint after medical procedures, including vaccination. Sitting or lying down for about 15 minutes can help prevent fainting and injuries caused by a fall. Tell your provider if you feel dizzy or have vision changes or ringing in the ears. · Some people get shoulder pain that can be more severe and longer-lasting than routine soreness that can follow injections. This happens very rarely. · Any medication can cause a severe allergic reaction. Such reactions to a vaccine are estimated at about 1 in a million doses, and would happen within a few minutes to a few hours after the vaccination. As with any medicine, there is a very remote chance of a vaccine causing a serious injury or death. The safety of vaccines is always being monitored. For more information, visit: www.cdc.gov/vaccinesafety/  What if there is a serious problem? What should I look for? · Look for anything that concerns you, such as signs of a severe allergic reaction, very high fever, or unusual behavior. Signs of a severe allergic reaction can include hives, swelling of the face and throat, difficulty breathing, a fast heartbeat, dizziness, and weakness. These would usually start a few minutes to a few hours after the vaccination. What should I do? · If you think it is a severe allergic reaction or other emergency that can't wait, call 9-1-1 or get to the nearest hospital. Otherwise, call your health care provider. · Afterward, the reaction should be reported to the Vaccine Adverse Event Reporting System (VAERS).  Your doctor should file this report, or you can do it yourself through the VAERS website at www.[employer]. [facility].gov, or by calling 9-403.195.2260. VAERS does not give medical advice. .  How can I learn more? · Ask your health care provider. He or she can give you the vaccine package insert or suggest other sources of information. · Call your local or state health department. · Contact the Centers for Disease Control and Prevention (CDC):  ? Call 1-128.754.9017 (1-800-CDC-INFO) or  ? Visit CDC's website at www.cdc.gov/vaccines  Vaccine Information Statement (Interim)  Recombinant Zoster Vaccine  2/12/2018  [facility] for Disease Control and Prevention  Many Vaccine Information Statements are available in [place] and other languages. See www.immunize.org/vis. Hojas de Información Sobre Vacunas están disponibles en Español y en muchos otros idiomas. Visite [email or web address]   Care instructions adapted under license by [facility] ([facility]). If you have questions about a medical condition or this instruction, always ask your healthcare professional. [address] any warranty or liability for your use of this information.

## 2019-04-17 LAB
ESTIMATED AVERAGE GLUCOSE: 139.9 MG/DL
HBA1C MFR BLD: 6.5 %

## 2019-04-30 ENCOUNTER — HOSPITAL ENCOUNTER (OUTPATIENT)
Dept: WOMENS IMAGING | Age: 84
Discharge: HOME | End: 2019-04-30
Payer: MEDICARE

## 2019-04-30 DIAGNOSIS — M81.0 POSTMENOPAUSAL OSTEOPOROSIS: ICD-10-CM

## 2019-04-30 PROCEDURE — 77080 DXA BONE DENSITY AXIAL: CPT

## 2019-05-07 ENCOUNTER — OFFICE VISIT (OUTPATIENT)
Dept: FAMILY MEDICINE CLINIC | Age: 84
End: 2019-05-07
Payer: MEDICARE

## 2019-05-07 VITALS
SYSTOLIC BLOOD PRESSURE: 118 MMHG | HEIGHT: 66 IN | BODY MASS INDEX: 31.98 KG/M2 | TEMPERATURE: 98.9 F | DIASTOLIC BLOOD PRESSURE: 68 MMHG | WEIGHT: 199 LBS

## 2019-05-07 DIAGNOSIS — M81.0 AGE-RELATED OSTEOPOROSIS WITHOUT CURRENT PATHOLOGICAL FRACTURE: Primary | ICD-10-CM

## 2019-05-07 PROCEDURE — 99213 OFFICE O/P EST LOW 20 MIN: CPT | Performed by: FAMILY MEDICINE

## 2019-05-07 PROCEDURE — G8417 CALC BMI ABV UP PARAM F/U: HCPCS | Performed by: FAMILY MEDICINE

## 2019-05-07 PROCEDURE — 1036F TOBACCO NON-USER: CPT | Performed by: FAMILY MEDICINE

## 2019-05-07 PROCEDURE — G8427 DOCREV CUR MEDS BY ELIG CLIN: HCPCS | Performed by: FAMILY MEDICINE

## 2019-05-07 PROCEDURE — G8399 PT W/DXA RESULTS DOCUMENT: HCPCS | Performed by: FAMILY MEDICINE

## 2019-05-07 PROCEDURE — 4040F PNEUMOC VAC/ADMIN/RCVD: CPT | Performed by: FAMILY MEDICINE

## 2019-05-07 PROCEDURE — 1090F PRES/ABSN URINE INCON ASSESS: CPT | Performed by: FAMILY MEDICINE

## 2019-05-07 PROCEDURE — 1123F ACP DISCUSS/DSCN MKR DOCD: CPT | Performed by: FAMILY MEDICINE

## 2019-05-07 PROCEDURE — G8598 ASA/ANTIPLAT THER USED: HCPCS | Performed by: FAMILY MEDICINE

## 2019-05-07 RX ORDER — ALENDRONATE SODIUM 70 MG/1
70 TABLET ORAL
COMMUNITY
End: 2020-01-01

## 2019-05-07 NOTE — PROGRESS NOTES
5/7/2019    Татьяна Hatfield is a 81 y.o. female    Chief Complaint   Patient presents with    Check-Up     follow up on dexa scan       SUBJECTIVE  HPI patient is here today to follow up with her abnormal DEXA scan  She states her pulmonologist put her on'calcium'for her bones that she takes once a week      Review of Systems   Constitutional: Negative for chills, fatigue and fever. Respiratory: Negative for shortness of breath. Cardiovascular: Negative for chest pain. Gastrointestinal: Negative for abdominal pain. Musculoskeletal: Negative for myalgias. OBJECTIVE  Physical Exam   Constitutional: She is oriented to person, place, and time. She appears well-developed and well-nourished. Neck: No thyromegaly present. Cardiovascular: Normal rate and regular rhythm. Pulmonary/Chest: Effort normal and breath sounds normal.   Musculoskeletal: She exhibits no edema. Neurological: She is alert and oriented to person, place, and time. Psychiatric: She has a normal mood and affect. Her behavior is normal.   Vitals reviewed. Allergies  Acetaminophen-codeine and Codeine    Current Outpatient Medications   Medication Sig Dispense Refill    alendronate (FOSAMAX) 70 MG tablet Take 70 mg by mouth every 7 days      [START ON 6/26/2019] ALPRAZolam (XANAX) 1 MG tablet TAKE ONE TABLET BY MOUTH TWICE A DAY AS NEEDED FOR ANXIETY. NO ACOHOL. NO DRIVING. Do not take with percocet.  60 tablet 0    pregabalin (LYRICA) 50 MG capsule 1 tab AM 2 tabs PM. 270 capsule 1    diclofenac sodium (VOLTAREN) 1 % GEL Apply 2-4 grams to right knee TID 5 Tube 1    levothyroxine (SYNTHROID) 100 MCG tablet TAKE ONE TABLET BY MOUTH DAILY 90 tablet 2    pantoprazole (PROTONIX) 20 MG tablet Take 20 mg by mouth daily      ranolazine (RANEXA) 500 MG extended release tablet Take 500 mg by mouth 2 times daily      vitamin D (ERGOCALCIFEROL) 71480 units CAPS capsule TAKE ONE CAPSULE BY MOUTH ONCE WEEKLY 4 capsule 4    atorvastatin (LIPITOR) 80 MG tablet Take 80 mg by mouth daily      amLODIPine (NORVASC) 5 MG tablet Take 2.5 mg by mouth daily       carBAMazepine (TEGRETOL-XR) 100 MG extended release tablet Take 1 tablet by mouth 2 times daily (Patient taking differently: Take 300 mg by mouth 3 times daily Indications: Taking 800 mg daily, 300mg, 200mg, 300mg ) 100 tablet 3    allopurinol (ZYLOPRIM) 300 MG tablet Take 300 mg by mouth      Respiratory Therapy Supplies (NEBULIZER COMPRESSOR) KIT 1 kit by Does not apply route once      Roflumilast (DALIRESP) 500 MCG tablet Take 500 mcg by mouth daily      isosorbide mononitrate (IMDUR) 30 MG CR tablet Take 1 tablet by mouth daily. (Patient taking differently: Take 30 mg by mouth 2 times daily.) 90 tablet 0    nitroGLYCERIN (NITROSTAT) 0.4 MG SL tablet 1 SL q 5 min prn chest pain. After 3 tabs, if pain persists, go to ER. 25 tablet 0    azithromycin (ZITHROMAX) 500 MG tablet Take 500 mg by mouth. m-w-f      metoprolol (LOPRESSOR) 25 MG tablet Take 50 mg by mouth 2 times daily.  PREDNISONE Take 10 mg by mouth daily at 1800       tiotropium (SPIRIVA) 18 MCG inhalation capsule Inhale 18 mcg into the lungs daily.  montelukast (SINGULAIR) 10 MG tablet Take 10 mg by mouth nightly.  psyllium (METAMUCIL SMOOTH TEXTURE) 28 % packet Take 1 packet by mouth 2 times daily. Take with full glass of h20 or juice       albuterol (PROVENTIL HFA;VENTOLIN HFA) 108 (90 BASE) MCG/ACT inhaler Inhale 2 puffs into the lungs every 6 hours as needed.  Potassium Chloride (KLOR-CON PO) Take 20 mEq by mouth daily       fluticasone-salmeterol (ADVAIR DISKUS) 250-50 MCG/DOSE AEPB Inhale 1 puff into the lungs 2 times daily.  clopidogrel (PLAVIX) 75 MG tablet Take 75 mg by mouth daily.  aspirin 81 MG EC tablet Take 81 mg by mouth daily.        Current Facility-Administered Medications   Medication Dose Route Frequency Provider Last Rate Last Dose    cyanocobalamin injection 1,000 mcg  1,000 mcg Intramuscular Once Dwain Reilly, DO        triamcinolone acetonide (KENALOG-40) injection 40 mg  40 mg Intramuscular Once Hakan Jeffries MD         Vitals:    05/07/19 1025   BP: 118/68   Temp: 98.9 °F (37.2 °C)      Body mass index is 32.12 kg/m². ASSESSMENT AND PLAN     Diagnosis Orders   1. Age-related osteoporosis without current pathological fracture         With a little bit intriguing we found that she is actually on Fosamax not on calcium  She will add calcium 1200 to 1500 mg a day  Discussed weightbearing exercise  Recheck DEXA in 2 years  New meds this visit:  No orders of the defined types were placed in this encounter. New orders placed this visit:  No orders of the defined types were placed in this encounter. No follow-ups on file. continue with the following meds:    Outpatient Encounter Medications as of 5/7/2019   Medication Sig Dispense Refill    alendronate (FOSAMAX) 70 MG tablet Take 70 mg by mouth every 7 days      [START ON 6/26/2019] ALPRAZolam (XANAX) 1 MG tablet TAKE ONE TABLET BY MOUTH TWICE A DAY AS NEEDED FOR ANXIETY. NO ACOHOL. NO DRIVING. Do not take with percocet.  60 tablet 0    pregabalin (LYRICA) 50 MG capsule 1 tab AM 2 tabs PM. 270 capsule 1    diclofenac sodium (VOLTAREN) 1 % GEL Apply 2-4 grams to right knee TID 5 Tube 1    levothyroxine (SYNTHROID) 100 MCG tablet TAKE ONE TABLET BY MOUTH DAILY 90 tablet 2    pantoprazole (PROTONIX) 20 MG tablet Take 20 mg by mouth daily      ranolazine (RANEXA) 500 MG extended release tablet Take 500 mg by mouth 2 times daily      vitamin D (ERGOCALCIFEROL) 48033 units CAPS capsule TAKE ONE CAPSULE BY MOUTH ONCE WEEKLY 4 capsule 4    atorvastatin (LIPITOR) 80 MG tablet Take 80 mg by mouth daily      amLODIPine (NORVASC) 5 MG tablet Take 2.5 mg by mouth daily       carBAMazepine (TEGRETOL-XR) 100 MG extended release tablet Take 1 tablet by mouth 2 times daily (Patient taking differently: Take 300 mg by mouth 3 times daily Indications: Taking 800 mg daily, 300mg, 200mg, 300mg ) 100 tablet 3    allopurinol (ZYLOPRIM) 300 MG tablet Take 300 mg by mouth      Respiratory Therapy Supplies (NEBULIZER COMPRESSOR) KIT 1 kit by Does not apply route once      Roflumilast (DALIRESP) 500 MCG tablet Take 500 mcg by mouth daily      isosorbide mononitrate (IMDUR) 30 MG CR tablet Take 1 tablet by mouth daily. (Patient taking differently: Take 30 mg by mouth 2 times daily.) 90 tablet 0    nitroGLYCERIN (NITROSTAT) 0.4 MG SL tablet 1 SL q 5 min prn chest pain. After 3 tabs, if pain persists, go to ER. 25 tablet 0    azithromycin (ZITHROMAX) 500 MG tablet Take 500 mg by mouth. m-w-f      metoprolol (LOPRESSOR) 25 MG tablet Take 50 mg by mouth 2 times daily.  PREDNISONE Take 10 mg by mouth daily at 1800       tiotropium (SPIRIVA) 18 MCG inhalation capsule Inhale 18 mcg into the lungs daily.  montelukast (SINGULAIR) 10 MG tablet Take 10 mg by mouth nightly.  psyllium (METAMUCIL SMOOTH TEXTURE) 28 % packet Take 1 packet by mouth 2 times daily. Take with full glass of h20 or juice       albuterol (PROVENTIL HFA;VENTOLIN HFA) 108 (90 BASE) MCG/ACT inhaler Inhale 2 puffs into the lungs every 6 hours as needed.  Potassium Chloride (KLOR-CON PO) Take 20 mEq by mouth daily       fluticasone-salmeterol (ADVAIR DISKUS) 250-50 MCG/DOSE AEPB Inhale 1 puff into the lungs 2 times daily.  clopidogrel (PLAVIX) 75 MG tablet Take 75 mg by mouth daily.  aspirin 81 MG EC tablet Take 81 mg by mouth daily.        Facility-Administered Encounter Medications as of 5/7/2019   Medication Dose Route Frequency Provider Last Rate Last Dose    cyanocobalamin injection 1,000 mcg  1,000 mcg Intramuscular Once Teresa Kapadia,         triamcinolone acetonide (KENALOG-40) injection 40 mg  40 mg Intramuscular Once MD Teresa Gilman DSapphireO.

## 2019-05-07 NOTE — PATIENT INSTRUCTIONS
Patient Education        calcium and vitamin D combination  Pronunciation:  REESE see um and MASTER ta min D  Brand:  Calcitrate with D, Citracal + D, [place] Calcium+D3, Oyster [place] Calcium with Vitamin D  What is the most important information I should know about calcium and vitamin D combination? Follow all directions on your medicine label and package. Tell each of your healthcare providers about all your medical conditions, allergies, and all medicines you use. What is calcium and vitamin D combination? Calcium is a mineral that is found naturally in foods. Calcium is necessary for many normal functions of your body, especially bone formation and maintenance. Vitamin D is important for the absorption of calcium from the stomach and for the functioning of calcium in the body. Calcium and vitamin D combination is used to prevent or to treat a calcium deficiency. There are many brands and forms of calcium and vitamin D combination available. Not all brands are listed on this leaflet. Calcium and vitamin D combination may also be used for purposes not listed in this medication guide. What should I discuss with my healthcare provider before taking calcium and vitamin D combination? Ask a doctor or pharmacist if it is safe for you to take this medicine if you have ever had:  · kidney disease;  · kidney stones;  · heart disease;  · cancer;  · high levels of calcium in your blood;  · circulation problems; or  · a parathyroid gland disorder. Ask a doctor before using this product if you are pregnant or breast-feeding. Your dose needs may be different during pregnancy or while you are nursing. How should I take calcium and vitamin D combination? Use exactly as directed on the label, or as prescribed by your doctor. Do not use in larger or smaller amounts or for longer than recommended. Check the label of your calcium and vitamin D combination product to see if it should be taken with or without food.   Take the calcium and vitamin D regular tablet with a full glass of water. The chewable tablet must be chewed before you swallow it. Do not crush, chew, or break an extended-release tablet. Swallow it whole. Calcium and vitamin D may be only part of a complete program of treatment that also includes dietary changes. Learn about the foods that contain calcium and vitamin D. Your supplement dose may need to be adjusted as you make changes to your diet. Follow your doctor's instructions very closely. Store at room temperature away from moisture and heat. What happens if I miss a dose? Take the missed dose as soon as you remember. Skip the missed dose if it is almost time for your next scheduled dose. Do not take extra medicine to make up the missed dose. What happens if I overdose? Seek emergency medical attention or call the Poison Help line at [phone]. What should I avoid while taking calcium and vitamin D combination? Ask a doctor or pharmacist before taking any multivitamins, mineral supplements, or antacids while you are taking calcium and vitamin D combination. What are the possible side effects of calcium and vitamin D combination? Get emergency medical help if you have signs of an allergic reaction: hives; difficult breathing; swelling of your face, lips, tongue, or throat. Call your doctor at once if you have signs of too much calcium in your body, such as:  · nausea, vomiting, constipation;  · increased thirst or urination;  · muscle weakness, bone pain; or  · confusion, lack of energy, or feeling tired. Common side effects may include:  · an irregular heartbeat;  · weakness, drowsiness, headache;  · dry mouth, or a metallic taste in your mouth; or  · muscle or bone pain. This is not a complete list of side effects and others may occur. Call your doctor for medical advice about side effects. You may report side effects to FDA at 4-975-KWD-2658.   What other drugs will affect calcium and vitamin D healthcare administered with the aid of information [facility] provides. The information contained herein is not intended to cover all possible uses, directions, precautions, warnings, drug interactions, allergic reactions, or adverse effects. If you have questions about the drugs you are taking, check with your doctor, nurse or pharmacist.  Copyright 5845-3852 [employer] [address] [place] [address]. Version: 4.01. Revision date: 12/13/2017. Care instructions adapted under license by [facility] [place] ([facility]). If you have questions about a medical condition or this instruction, always ask your healthcare professional. [place] [address] any warranty or liability for your use of this information. Patient Education        Learning About Calcium  What is calcium? Calcium keeps your bones and muscles--including your heart--healthy and strong. Your body needs vitamin D to absorb calcium. People who don't get enough calcium and vitamin D throughout life have an increased chance of having thin and brittle bones (osteoporosis) in their later years. Thin and brittle bones break easily. They can lead to serious injuries. This is why it's important for you to get enough calcium and vitamin D as a child and as an adult. It helps keep your bones strong as you get older. And it protects you against possible breaks. Your body also uses vitamin D to help your muscles absorb calcium and work well. If your muscles don't get enough calcium, then they can cramp, hurt, or feel weak. You may have long-term (chronic) muscle aches and pains. How much calcium do you need? How much calcium you need each day changes as you age. The [place] [employer] recommends the following amounts of calcium each day.   · Ages 1 to 3 years: 700 milligrams  · Ages 4 to 8 years: 1,000 milligrams  · Ages 5 to 18 years: 1,300 milligrams  · Ages 23 to 48 years: 1,000 milligrams  · Males 51 to 70 years: 1,000 milligrams  · Females 51 to 70 years: 1,200 milligrams  · Ages 70 and older: 1,200 milligrams  Women who are pregnant or breastfeeding need the same amount of calcium and vitamin D as other women their age. How can you get enough calcium? Calcium is in foods such as milk, cheese, and yogurt. Vegetables like broccoli, kale, and Chinese cabbage also have it. You can get calcium if you eat the soft edible bones in canned sardines and canned salmon. Foods with added (fortified) calcium include some cereals, juices, soy drinks, and tofu. The food label will show how much of it was added. You can figure out how much calcium is in a food by looking at the percent daily value section on the nutrition facts label. The food label assumes the daily value of calcium is 1,000 mg. So if one serving of a food has a daily value of 20% of calcium, that food has 200 mg of calcium in one serving. Some people who don't get enough calcium may need supplements. You can buy them as citrate or carbonate. Calcium carbonate is best absorbed when it is taken with food. Calcium citrate can be absorbed well with or without food. Spreading calcium out over the course of the day can reduce stomach upset. And your body absorbs it better when it is spread over the day. Try not to take more than 500 mg of calcium supplement at a time. Where can you learn more? Go to https://[employer]. org and sign in to your [employer] account. Enter S264 in the [employer] box to learn more about \"Learning About Calcium. \"     If you do not have an account, please click on the \"Sign Up Now\" link. Current as of: March 28, 2018  Content Version: 11.9  © 0411-3711 [employer]. Care instructions adapted under license by [facility] ([facility]). If you have questions about a medical condition or this instruction, always ask your healthcare professional. [address] any warranty or liability for your use of this information.

## 2019-05-10 ASSESSMENT — ENCOUNTER SYMPTOMS
ABDOMINAL PAIN: 0
SHORTNESS OF BREATH: 0

## 2019-05-15 ENCOUNTER — OFFICE VISIT (OUTPATIENT)
Dept: PAIN MANAGEMENT | Age: 84
End: 2019-05-15
Payer: MEDICARE

## 2019-05-15 VITALS
WEIGHT: 197 LBS | DIASTOLIC BLOOD PRESSURE: 76 MMHG | BODY MASS INDEX: 31.8 KG/M2 | HEART RATE: 83 BPM | SYSTOLIC BLOOD PRESSURE: 144 MMHG

## 2019-05-15 DIAGNOSIS — G50.0 TRIGEMINAL NEURALGIA: ICD-10-CM

## 2019-05-15 DIAGNOSIS — M54.12 CERVICAL RADICULITIS: ICD-10-CM

## 2019-05-15 DIAGNOSIS — M50.30 DEGENERATION OF CERVICAL INTERVERTEBRAL DISC: ICD-10-CM

## 2019-05-15 DIAGNOSIS — G89.4 CHRONIC PAIN SYNDROME: ICD-10-CM

## 2019-05-15 DIAGNOSIS — M17.11 PRIMARY OSTEOARTHRITIS OF RIGHT KNEE: ICD-10-CM

## 2019-05-15 DIAGNOSIS — M47.812 CERVICAL SPONDYLOSIS WITHOUT MYELOPATHY: ICD-10-CM

## 2019-05-15 PROCEDURE — G8399 PT W/DXA RESULTS DOCUMENT: HCPCS | Performed by: INTERNAL MEDICINE

## 2019-05-15 PROCEDURE — 1123F ACP DISCUSS/DSCN MKR DOCD: CPT | Performed by: INTERNAL MEDICINE

## 2019-05-15 PROCEDURE — 99213 OFFICE O/P EST LOW 20 MIN: CPT | Performed by: INTERNAL MEDICINE

## 2019-05-15 PROCEDURE — 1090F PRES/ABSN URINE INCON ASSESS: CPT | Performed by: INTERNAL MEDICINE

## 2019-05-15 PROCEDURE — G8598 ASA/ANTIPLAT THER USED: HCPCS | Performed by: INTERNAL MEDICINE

## 2019-05-15 PROCEDURE — G8427 DOCREV CUR MEDS BY ELIG CLIN: HCPCS | Performed by: INTERNAL MEDICINE

## 2019-05-15 PROCEDURE — 4040F PNEUMOC VAC/ADMIN/RCVD: CPT | Performed by: INTERNAL MEDICINE

## 2019-05-15 PROCEDURE — 20610 DRAIN/INJ JOINT/BURSA W/O US: CPT | Performed by: INTERNAL MEDICINE

## 2019-05-15 PROCEDURE — G8417 CALC BMI ABV UP PARAM F/U: HCPCS | Performed by: INTERNAL MEDICINE

## 2019-05-15 PROCEDURE — 1036F TOBACCO NON-USER: CPT | Performed by: INTERNAL MEDICINE

## 2019-05-15 RX ORDER — OXYCODONE AND ACETAMINOPHEN 7.5; 325 MG/1; MG/1
1 TABLET ORAL EVERY 6 HOURS PRN
Qty: 90 TABLET | Refills: 0 | Status: SHIPPED | OUTPATIENT
Start: 2019-05-15 | End: 2019-08-07 | Stop reason: SDUPTHER

## 2019-05-15 RX ORDER — TRIAMCINOLONE ACETONIDE 40 MG/ML
40 INJECTION, SUSPENSION INTRA-ARTICULAR; INTRAMUSCULAR ONCE
Status: COMPLETED | OUTPATIENT
Start: 2019-05-15 | End: 2019-05-15

## 2019-05-15 RX ORDER — PREGABALIN 50 MG/1
CAPSULE ORAL
Qty: 270 CAPSULE | Refills: 1 | Status: SHIPPED | OUTPATIENT
Start: 2019-05-15 | End: 2019-08-07 | Stop reason: SDUPTHER

## 2019-05-15 RX ADMIN — TRIAMCINOLONE ACETONIDE 40 MG: 40 INJECTION, SUSPENSION INTRA-ARTICULAR; INTRAMUSCULAR at 16:39

## 2019-05-15 NOTE — PROGRESS NOTES
Cynthia Cruz  1935  [id number]    HISTORY OF PRESENT ILLNESS:  Ms. Lenora Arellano is a 81 y.o. female returns for a follow up visit for multiple medical problems. Her current presenting problems are   1. Chronic pain syndrome    2. Cervical radiculitis    3. Cervical spondylosis without myelopathy    4. Degeneration of cervical intervertebral disc    5. Primary osteoarthritis of right knee    . As per information/history obtained from the PADT(patient assessment and documentation tool) - She complains of pain in the neck with radiation to the shoulders Bilateral and knees Right She rates the pain 5/10 and describes it as sharp, aching, burning, numbness. Pain is made worse by: movement, walking, standing, sitting, bending, lifting. Current treatment regimen has helped relieve about 30% of the pain. She denies side effects from the current pain regimen. Patient reports that since the last follow up visit the physical functioning is unchanged, family/social relationships are unchanged, mood is unchanged and sleep patterns are unchanged, and that the overall functioning is unchanged. Patient denies neurological bowel or bladder. Patient denies misusing/abusing her narcotic pain medications or using any illegal drugs. There are No indicators for possible drug abuse, addiction or diversion problems. Upon obtaining the medical history from Ms. Lenora Arellano regarding today's office visit for her presenting problems, Patient states her right knee has been hurting a lot. She complains of pain on walking and standing. She says she is using Voltaren gel still. She mentions she is using Percocet along with Lyrica. She reports she feels the leg is going to give out. ALLERGIES: Patients list of allergies were reviewed     MEDICATIONS: Ms. Lenora Arellano list of medications were reviewed. Her current medications are   Outpatient Medications Prior to Visit   Medication Sig Dispense Refill    alendronate (FOSAMAX) 70 MG tablet Take 70 mg by mouth every 7 days      [START ON 6/26/2019] ALPRAZolam (XANAX) 1 MG tablet TAKE ONE TABLET BY MOUTH TWICE A DAY AS NEEDED FOR ANXIETY. NO ACOHOL. NO DRIVING. Do not take with percocet. 60 tablet 0    pregabalin (LYRICA) 50 MG capsule 1 tab AM 2 tabs PM. 270 capsule 1    diclofenac sodium (VOLTAREN) 1 % GEL Apply 2-4 grams to right knee TID 5 Tube 1    levothyroxine (SYNTHROID) 100 MCG tablet TAKE ONE TABLET BY MOUTH DAILY 90 tablet 2    pantoprazole (PROTONIX) 20 MG tablet Take 20 mg by mouth daily      ranolazine (RANEXA) 500 MG extended release tablet Take 500 mg by mouth 2 times daily      vitamin D (ERGOCALCIFEROL) 30773 units CAPS capsule TAKE ONE CAPSULE BY MOUTH ONCE WEEKLY 4 capsule 4    atorvastatin (LIPITOR) 80 MG tablet Take 80 mg by mouth daily      amLODIPine (NORVASC) 5 MG tablet Take 2.5 mg by mouth daily       carBAMazepine (TEGRETOL-XR) 100 MG extended release tablet Take 1 tablet by mouth 2 times daily (Patient taking differently: Take 300 mg by mouth 3 times daily Indications: Taking 800 mg daily, 300mg, 200mg, 300mg ) 100 tablet 3    allopurinol (ZYLOPRIM) 300 MG tablet Take 300 mg by mouth      Respiratory Therapy Supplies (NEBULIZER COMPRESSOR) KIT 1 kit by Does not apply route once      Roflumilast (DALIRESP) 500 MCG tablet Take 500 mcg by mouth daily      isosorbide mononitrate (IMDUR) 30 MG CR tablet Take 1 tablet by mouth daily. (Patient taking differently: Take 30 mg by mouth 2 times daily.) 90 tablet 0    nitroGLYCERIN (NITROSTAT) 0.4 MG SL tablet 1 SL q 5 min prn chest pain. After 3 tabs, if pain persists, go to ER. 25 tablet 0    azithromycin (ZITHROMAX) 500 MG tablet Take 500 mg by mouth. m-w-f      metoprolol (LOPRESSOR) 25 MG tablet Take 50 mg by mouth 2 times daily.  PREDNISONE Take 10 mg by mouth daily at 1800       tiotropium (SPIRIVA) 18 MCG inhalation capsule Inhale 18 mcg into the lungs daily.       montelukast (SINGULAIR) 10 MG tablet Take 10 mg by mouth nightly.  psyllium (METAMUCIL SMOOTH TEXTURE) 28 % packet Take 1 packet by mouth 2 times daily. Take with full glass of h20 or juice       albuterol (PROVENTIL HFA;VENTOLIN HFA) 108 (90 BASE) MCG/ACT inhaler Inhale 2 puffs into the lungs every 6 hours as needed.  Potassium Chloride (KLOR-CON PO) Take 20 mEq by mouth daily       fluticasone-salmeterol (ADVAIR DISKUS) 250-50 MCG/DOSE AEPB Inhale 1 puff into the lungs 2 times daily.  clopidogrel (PLAVIX) 75 MG tablet Take 75 mg by mouth daily.  aspirin 81 MG EC tablet Take 81 mg by mouth daily. Facility-Administered Medications Prior to Visit   Medication Dose Route Frequency Provider Last Rate Last Dose    cyanocobalamin injection 1,000 mcg  1,000 mcg Intramuscular Once Minerva Bone, DO        triamcinolone acetonide (KENALOG-40) injection 40 mg  40 mg Intramuscular Once Alicia Abdalla MD           SOCIAL/FAMILY/PAST MEDICAL HISTORY: Ms. Romy Vincent, family and past medical history was reviewed. REVIEW OF SYSTEMS:    Respiratory: Negative for apnea, chest tightness and shortness of breath or change in baseline breathing. Gastrointestinal: Negative for nausea, vomiting, abdominal pain, diarrhea, constipation, blood in stool and abdominal distention. PHYSICAL EXAM:   Nursing note and vitals reviewed. BP (!) 144/76   Pulse 83   Wt 197 lb (89.4 kg)   BMI 31.80 kg/m²   Constitutional: She appears well-developed and well-nourished. No acute distress. Skin: Skin is warm and dry, good turgor. No rash noted. She is not diaphoretic. Cardiovascular: Normal rate, regular rhythm, normal heart sounds, and does not have murmur. + crepts in base   Pulmonary/Chest: Effort normal. No respiratory distress. She does not have wheezes in the lung fields. She has no rales. Neurological/Psychiatric:She is alert and oriented to person, place, and time.  Coordination is  normal.  Her mood isAppropriate and affect is Neutral/Euthymic(normal) . Other: left knee, + crepitation, neg effusion decrease ROM     IMPRESSION:   1. Primary osteoarthritis of right knee    2. Chronic pain syndrome    3. Cervical radiculitis    4. Cervical spondylosis without myelopathy    5. Degeneration of cervical intervertebral disc        PLAN:  Informed verbal consent was obtained  -Continue with current opioid regimen   -Adv Biofeedback, relaxation and meditation techniques. Referral to psychologist for CBT was also discussed with patient   -Walking as tolerated exercises given for knee  Informed verbal consent was obtained from the patient. Risks and benefits of the procedure were explained. Complications of the procedure and side effects of kenalog/ lidocaine were discussed with patient. Using 0.25% marcaine and 1cc of kenalog=40mg the right knee was injected under aseptic conditions. Patient tolerated procedure well. Advised ice and rest.  -She was advised to increase fluids ( 5-7  glasses of fluid daily), limit caffeine, avoid cheese products, increase dietary fiber, increase activity and exercise as tolerated and relax regularly and enjoy meals    Current Outpatient Medications   Medication Sig Dispense Refill    alendronate (FOSAMAX) 70 MG tablet Take 70 mg by mouth every 7 days      [START ON 6/26/2019] ALPRAZolam (XANAX) 1 MG tablet TAKE ONE TABLET BY MOUTH TWICE A DAY AS NEEDED FOR ANXIETY. NO ACOHOL. NO DRIVING. Do not take with percocet.  60 tablet 0    pregabalin (LYRICA) 50 MG capsule 1 tab AM 2 tabs PM. 270 capsule 1    diclofenac sodium (VOLTAREN) 1 % GEL Apply 2-4 grams to right knee TID 5 Tube 1    levothyroxine (SYNTHROID) 100 MCG tablet TAKE ONE TABLET BY MOUTH DAILY 90 tablet 2    pantoprazole (PROTONIX) 20 MG tablet Take 20 mg by mouth daily      ranolazine (RANEXA) 500 MG extended release tablet Take 500 mg by mouth 2 times daily      vitamin D (ERGOCALCIFEROL) 60755 units CAPS capsule TAKE ONE CAPSULE BY MOUTH ONCE WEEKLY 4 capsule 4    atorvastatin (LIPITOR) 80 MG tablet Take 80 mg by mouth daily      amLODIPine (NORVASC) 5 MG tablet Take 2.5 mg by mouth daily       carBAMazepine (TEGRETOL-XR) 100 MG extended release tablet Take 1 tablet by mouth 2 times daily (Patient taking differently: Take 300 mg by mouth 3 times daily Indications: Taking 800 mg daily, 300mg, 200mg, 300mg ) 100 tablet 3    allopurinol (ZYLOPRIM) 300 MG tablet Take 300 mg by mouth      Respiratory Therapy Supplies (NEBULIZER COMPRESSOR) KIT 1 kit by Does not apply route once      Roflumilast (DALIRESP) 500 MCG tablet Take 500 mcg by mouth daily      isosorbide mononitrate (IMDUR) 30 MG CR tablet Take 1 tablet by mouth daily. (Patient taking differently: Take 30 mg by mouth 2 times daily.) 90 tablet 0    nitroGLYCERIN (NITROSTAT) 0.4 MG SL tablet 1 SL q 5 min prn chest pain. After 3 tabs, if pain persists, go to ER. 25 tablet 0    azithromycin (ZITHROMAX) 500 MG tablet Take 500 mg by mouth. m-w-f      metoprolol (LOPRESSOR) 25 MG tablet Take 50 mg by mouth 2 times daily.  PREDNISONE Take 10 mg by mouth daily at 1800       tiotropium (SPIRIVA) 18 MCG inhalation capsule Inhale 18 mcg into the lungs daily.  montelukast (SINGULAIR) 10 MG tablet Take 10 mg by mouth nightly.  psyllium (METAMUCIL SMOOTH TEXTURE) 28 % packet Take 1 packet by mouth 2 times daily. Take with full glass of h20 or juice       albuterol (PROVENTIL HFA;VENTOLIN HFA) 108 (90 BASE) MCG/ACT inhaler Inhale 2 puffs into the lungs every 6 hours as needed.  Potassium Chloride (KLOR-CON PO) Take 20 mEq by mouth daily       fluticasone-salmeterol (ADVAIR DISKUS) 250-50 MCG/DOSE AEPB Inhale 1 puff into the lungs 2 times daily.  clopidogrel (PLAVIX) 75 MG tablet Take 75 mg by mouth daily.  aspirin 81 MG EC tablet Take 81 mg by mouth daily.        Current Facility-Administered Medications   Medication Dose Route Frequency Provider Last Rate Last Dose    patient should call the office. While transcribing every attempt was made to maintain the accuracy of the note in terms of it's contents,there may have been some errors made inadvertently. Thank you for allowing me to participate in the care of this patient.     Everton Lloyd MD.    Cc: [address] 1960 JonesDO    I, Venkat Spaulding am scribing for and in the presence of Dr. Everton Lloyd.   05/15/19  4:19 PM  Venkat Spaulding MA     I, Dr. Everton Lloyd, personally performed the services described in this documentation as scribed by   Venkat Spaulding MA in my presence and it is both accurate and complete

## 2019-05-25 RX ORDER — ERGOCALCIFEROL 1.25 MG/1
CAPSULE ORAL
Qty: 12 CAPSULE | Refills: 3 | Status: SHIPPED | OUTPATIENT
Start: 2019-05-25 | End: 2020-01-01

## 2019-07-16 ENCOUNTER — OFFICE VISIT (OUTPATIENT)
Dept: FAMILY MEDICINE CLINIC | Age: 84
End: 2019-07-16
Payer: MEDICARE

## 2019-07-16 VITALS
BODY MASS INDEX: 32.17 KG/M2 | TEMPERATURE: 98.3 F | DIASTOLIC BLOOD PRESSURE: 70 MMHG | SYSTOLIC BLOOD PRESSURE: 134 MMHG | HEIGHT: 66 IN | WEIGHT: 200.2 LBS

## 2019-07-16 DIAGNOSIS — I25.119 CORONARY ARTERY DISEASE INVOLVING NATIVE CORONARY ARTERY OF NATIVE HEART WITH ANGINA PECTORIS (HCC): ICD-10-CM

## 2019-07-16 DIAGNOSIS — G89.29 CHRONIC GENERALIZED PAIN: Primary | ICD-10-CM

## 2019-07-16 DIAGNOSIS — J44.9 COPD, SEVERE (HCC): ICD-10-CM

## 2019-07-16 DIAGNOSIS — R10.11 RIGHT UPPER QUADRANT PAIN: ICD-10-CM

## 2019-07-16 DIAGNOSIS — R52 CHRONIC GENERALIZED PAIN: Primary | ICD-10-CM

## 2019-07-16 DIAGNOSIS — F51.01 PRIMARY INSOMNIA: ICD-10-CM

## 2019-07-16 DIAGNOSIS — R10.11 RIGHT UPPER QUADRANT PAIN: Primary | ICD-10-CM

## 2019-07-16 LAB
A/G RATIO: 1.5 (ref 1.1–2.2)
ALBUMIN SERPL-MCNC: 3.8 G/DL (ref 3.4–5)
ALP BLD-CCNC: 87 U/L (ref 40–129)
ALT SERPL-CCNC: 14 U/L (ref 10–40)
AMPHETAMINE SCREEN, URINE: ABNORMAL
ANION GAP SERPL CALCULATED.3IONS-SCNC: 13 MMOL/L (ref 3–16)
AST SERPL-CCNC: 22 U/L (ref 15–37)
BARBITURATE SCREEN URINE: ABNORMAL
BASOPHILS ABSOLUTE: 0.1 K/UL (ref 0–0.2)
BASOPHILS RELATIVE PERCENT: 0.6 %
BENZODIAZEPINE SCREEN, URINE: POSITIVE
BILIRUB SERPL-MCNC: 0.3 MG/DL (ref 0–1)
BILIRUBIN, POC: ABNORMAL
BLOOD URINE, POC: NEGATIVE
BUN BLDV-MCNC: 9 MG/DL (ref 7–20)
CALCIUM SERPL-MCNC: 8.4 MG/DL (ref 8.3–10.6)
CANNABINOID SCREEN URINE: ABNORMAL
CHLORIDE BLD-SCNC: 97 MMOL/L (ref 99–110)
CLARITY, POC: CLEAR
CO2: 26 MMOL/L (ref 21–32)
COCAINE METABOLITE SCREEN URINE: ABNORMAL
COLOR, POC: YELLOW
CREAT SERPL-MCNC: 0.9 MG/DL (ref 0.6–1.2)
EOSINOPHILS ABSOLUTE: 0.1 K/UL (ref 0–0.6)
EOSINOPHILS RELATIVE PERCENT: 1.2 %
GFR AFRICAN AMERICAN: >60
GFR NON-AFRICAN AMERICAN: 60
GLOBULIN: 2.5 G/DL
GLUCOSE BLD-MCNC: 165 MG/DL (ref 70–99)
GLUCOSE URINE, POC: NEGATIVE
HCT VFR BLD CALC: 35.7 % (ref 36–48)
HEMOGLOBIN: 12 G/DL (ref 12–16)
KETONES, POC: NEGATIVE
LEUKOCYTE EST, POC: ABNORMAL
LYMPHOCYTES ABSOLUTE: 1.5 K/UL (ref 1–5.1)
LYMPHOCYTES RELATIVE PERCENT: 14.6 %
Lab: ABNORMAL
MCH RBC QN AUTO: 34.8 PG (ref 26–34)
MCHC RBC AUTO-ENTMCNC: 33.7 G/DL (ref 31–36)
MCV RBC AUTO: 103 FL (ref 80–100)
METHADONE SCREEN, URINE: ABNORMAL
MONOCYTES ABSOLUTE: 0.6 K/UL (ref 0–1.3)
MONOCYTES RELATIVE PERCENT: 6.2 %
NEUTROPHILS ABSOLUTE: 7.8 K/UL (ref 1.7–7.7)
NEUTROPHILS RELATIVE PERCENT: 77.4 %
NITRITE, POC: NEGATIVE
OPIATE SCREEN URINE: ABNORMAL
OXYCODONE URINE: ABNORMAL
PDW BLD-RTO: 14 % (ref 12.4–15.4)
PH UA: 6
PH, POC: 7
PHENCYCLIDINE SCREEN URINE: ABNORMAL
PLATELET # BLD: 194 K/UL (ref 135–450)
PMV BLD AUTO: 9.9 FL (ref 5–10.5)
POTASSIUM SERPL-SCNC: 4.7 MMOL/L (ref 3.5–5.1)
PROPOXYPHENE SCREEN: ABNORMAL
PROTEIN, POC: ABNORMAL
RBC # BLD: 3.47 M/UL (ref 4–5.2)
SODIUM BLD-SCNC: 136 MMOL/L (ref 136–145)
SPECIFIC GRAVITY, POC: 1.02
TOTAL PROTEIN: 6.3 G/DL (ref 6.4–8.2)
UROBILINOGEN, POC: 0.2
WBC # BLD: 10 K/UL (ref 4–11)

## 2019-07-16 PROCEDURE — G8399 PT W/DXA RESULTS DOCUMENT: HCPCS | Performed by: FAMILY MEDICINE

## 2019-07-16 PROCEDURE — G8926 SPIRO NO PERF OR DOC: HCPCS | Performed by: FAMILY MEDICINE

## 2019-07-16 PROCEDURE — 4040F PNEUMOC VAC/ADMIN/RCVD: CPT | Performed by: FAMILY MEDICINE

## 2019-07-16 PROCEDURE — G8417 CALC BMI ABV UP PARAM F/U: HCPCS | Performed by: FAMILY MEDICINE

## 2019-07-16 PROCEDURE — 3023F SPIROM DOC REV: CPT | Performed by: FAMILY MEDICINE

## 2019-07-16 PROCEDURE — 1036F TOBACCO NON-USER: CPT | Performed by: FAMILY MEDICINE

## 2019-07-16 PROCEDURE — 81002 URINALYSIS NONAUTO W/O SCOPE: CPT | Performed by: FAMILY MEDICINE

## 2019-07-16 PROCEDURE — 99214 OFFICE O/P EST MOD 30 MIN: CPT | Performed by: FAMILY MEDICINE

## 2019-07-16 PROCEDURE — G8427 DOCREV CUR MEDS BY ELIG CLIN: HCPCS | Performed by: FAMILY MEDICINE

## 2019-07-16 PROCEDURE — 1123F ACP DISCUSS/DSCN MKR DOCD: CPT | Performed by: FAMILY MEDICINE

## 2019-07-16 PROCEDURE — G8598 ASA/ANTIPLAT THER USED: HCPCS | Performed by: FAMILY MEDICINE

## 2019-07-16 PROCEDURE — 1090F PRES/ABSN URINE INCON ASSESS: CPT | Performed by: FAMILY MEDICINE

## 2019-07-16 RX ORDER — ALPRAZOLAM 1 MG/1
TABLET ORAL
Qty: 60 TABLET | Refills: 0 | Status: SHIPPED | OUTPATIENT
Start: 2019-09-09 | End: 2019-07-16 | Stop reason: SDUPTHER

## 2019-07-16 RX ORDER — ALPRAZOLAM 1 MG/1
TABLET ORAL
Qty: 60 TABLET | Refills: 0 | Status: SHIPPED | OUTPATIENT
Start: 2019-08-09 | End: 2019-07-16 | Stop reason: SDUPTHER

## 2019-07-16 RX ORDER — ALPRAZOLAM 1 MG/1
TABLET ORAL
Qty: 60 TABLET | Refills: 0 | Status: SHIPPED | OUTPATIENT
Start: 2019-10-09 | End: 2019-01-01 | Stop reason: SDUPTHER

## 2019-07-16 ASSESSMENT — ENCOUNTER SYMPTOMS
EYE PAIN: 0
DIARRHEA: 0
NAUSEA: 0
VOMITING: 0
SHORTNESS OF BREATH: 0
BLOOD IN STOOL: 0
CONSTIPATION: 0
ABDOMINAL PAIN: 1

## 2019-07-16 NOTE — PROGRESS NOTES
puff into the lungs 2 times daily.  clopidogrel (PLAVIX) 75 MG tablet Take 75 mg by mouth daily.  aspirin 81 MG EC tablet Take 81 mg by mouth daily. Current Facility-Administered Medications   Medication Dose Route Frequency Provider Last Rate Last Dose    cyanocobalamin injection 1,000 mcg  1,000 mcg Intramuscular Once Alla Padilla DO        triamcinolone acetonide (KENALOG-40) injection 40 mg  40 mg Intramuscular Once Franklyn Sandoval MD         Vitals:    07/16/19 0956   BP: 134/70   Temp: 98.3 °F (36.8 °C)      Body mass index is 32.31 kg/m². ASSESSMENT AND PLAN     Diagnosis Orders   1. Right upper quadrant pain  POCT Urinalysis no Micro    CBC Auto Differential    COMPREHENSIVE METABOLIC PANEL    URINE CULTURE   2. Primary insomnia  ALPRAZolam (XANAX) 1 MG tablet    DRUG SCREEN MULTI URINE    DISCONTINUED: ALPRAZolam (XANAX) 1 MG tablet    DISCONTINUED: ALPRAZolam (XANAX) 1 MG tablet     Urine drug screen today  Pt is stable on current meds. Continue with current meds. New meds this visit:    Orders Placed This Encounter   Medications    DISCONTD: ALPRAZolam (XANAX) 1 MG tablet     Sig: TAKE ONE TABLET BY MOUTH TWICE A DAY AS NEEDED FOR ANXIETY. NO ACOHOL. NO DRIVING. Do not take with percocet. Dispense:  60 tablet     Refill:  0     Do not refill before 8/9/19    DISCONTD: ALPRAZolam (XANAX) 1 MG tablet     Sig: TAKE ONE TABLET BY MOUTH TWICE A DAY AS NEEDED FOR ANXIETY. NO ACOHOL. NO DRIVING. Do not take with percocet. Dispense:  60 tablet     Refill:  0     Do not refill before 9/9/19    ALPRAZolam (XANAX) 1 MG tablet     Sig: TAKE ONE TABLET BY MOUTH TWICE A DAY AS NEEDED FOR ANXIETY. NO ACOHOL. NO DRIVING. Do not take with percocet.      Dispense:  60 tablet     Refill:  0     Do not refill before 10/9/19     New orders placed this visit:    Orders Placed This Encounter   Procedures    URINE CULTURE     Order Specific Question:   Specify (ex-cath, midstream,

## 2019-07-18 LAB — URINE CULTURE, ROUTINE: NORMAL

## 2019-08-07 ENCOUNTER — OFFICE VISIT (OUTPATIENT)
Dept: PAIN MANAGEMENT | Age: 84
End: 2019-08-07
Payer: MEDICARE

## 2019-08-07 VITALS
WEIGHT: 195 LBS | HEART RATE: 67 BPM | SYSTOLIC BLOOD PRESSURE: 135 MMHG | DIASTOLIC BLOOD PRESSURE: 73 MMHG | BODY MASS INDEX: 31.47 KG/M2

## 2019-08-07 DIAGNOSIS — M17.11 PRIMARY OSTEOARTHRITIS OF RIGHT KNEE: ICD-10-CM

## 2019-08-07 DIAGNOSIS — M47.812 CERVICAL SPONDYLOSIS WITHOUT MYELOPATHY: ICD-10-CM

## 2019-08-07 DIAGNOSIS — G50.0 TRIGEMINAL NEURALGIA: ICD-10-CM

## 2019-08-07 DIAGNOSIS — G89.4 CHRONIC PAIN SYNDROME: ICD-10-CM

## 2019-08-07 DIAGNOSIS — M50.30 DEGENERATION OF CERVICAL INTERVERTEBRAL DISC: ICD-10-CM

## 2019-08-07 DIAGNOSIS — M54.12 CERVICAL RADICULITIS: ICD-10-CM

## 2019-08-07 PROCEDURE — G8417 CALC BMI ABV UP PARAM F/U: HCPCS | Performed by: INTERNAL MEDICINE

## 2019-08-07 PROCEDURE — G8399 PT W/DXA RESULTS DOCUMENT: HCPCS | Performed by: INTERNAL MEDICINE

## 2019-08-07 PROCEDURE — G8598 ASA/ANTIPLAT THER USED: HCPCS | Performed by: INTERNAL MEDICINE

## 2019-08-07 PROCEDURE — 99213 OFFICE O/P EST LOW 20 MIN: CPT | Performed by: INTERNAL MEDICINE

## 2019-08-07 PROCEDURE — 1036F TOBACCO NON-USER: CPT | Performed by: INTERNAL MEDICINE

## 2019-08-07 PROCEDURE — G8427 DOCREV CUR MEDS BY ELIG CLIN: HCPCS | Performed by: INTERNAL MEDICINE

## 2019-08-07 PROCEDURE — 1123F ACP DISCUSS/DSCN MKR DOCD: CPT | Performed by: INTERNAL MEDICINE

## 2019-08-07 PROCEDURE — 1090F PRES/ABSN URINE INCON ASSESS: CPT | Performed by: INTERNAL MEDICINE

## 2019-08-07 PROCEDURE — 4040F PNEUMOC VAC/ADMIN/RCVD: CPT | Performed by: INTERNAL MEDICINE

## 2019-08-07 RX ORDER — PREGABALIN 50 MG/1
CAPSULE ORAL
Qty: 270 CAPSULE | Refills: 1 | Status: SHIPPED | OUTPATIENT
Start: 2019-08-07 | End: 2019-01-01 | Stop reason: SDUPTHER

## 2019-08-07 RX ORDER — OXYCODONE AND ACETAMINOPHEN 7.5; 325 MG/1; MG/1
1 TABLET ORAL EVERY 6 HOURS PRN
Qty: 90 TABLET | Refills: 0 | Status: SHIPPED | OUTPATIENT
Start: 2019-08-07 | End: 2019-01-01 | Stop reason: SDUPTHER

## 2019-08-07 NOTE — PROGRESS NOTES
Solo Perry  1935  [id number]    HISTORY OF PRESENT ILLNESS:  Ms. Regi Rojas is a 81 y.o. female returns for a follow up visit for multiple medical problems. Her current presenting problems are   1. Chronic pain syndrome    2. Primary osteoarthritis of right knee    3. Cervical radiculitis    4. Cervical spondylosis without myelopathy    5. Degeneration of cervical intervertebral disc    . As per information/history obtained from the PADT(patient assessment and documentation tool) - She complains of pain in the neck and lower back with radiation to the knees Right She rates the pain 5/10 and describes it as sharp, aching, burning. Pain is made worse by: movement, walking, standing, sitting, bending, lifting. Current treatment regimen has helped relieve about 30% of the pain. She denies side effects from the current pain regimen. Patient reports that since the last follow up visit the physical functioning is unchanged, family/social relationships are unchanged, mood is unchanged and sleep patterns are unchanged, and that the overall functioning is unchanged. Patient denies neurological bowel or bladder. Patient denies misusing/abusing her narcotic pain medications or using any illegal drugs. There are No indicators for possible drug abuse, addiction or diversion problems. Upon obtaining the medical history from Ms. Regi Rojas regarding today's office visit for her presenting problems, Ms. Regi Rojas states she has been doing fair and managing with her regimen. She says she is using Voltaren gel along with Lyrica. She mentions the left thigh has been hurting. She reports the Voltaren gel is helping. She states she is has been walking around the house. ALLERGIES: Patients list of allergies were reviewed     MEDICATIONS: Ms. Regi Rojas list of medications were reviewed. Her current medications are   Outpatient Medications Prior to Visit   Medication Sig Dispense Refill    [START ON 10/9/2019] ALPRAZolam Scar Luna am scribing for and in the presence of Dr. Medora Closs.    08/07/19  4:16 PM  Scar Luna MA    I, Dr. Medora Closs, personally performed the services described in this documentation as scribed by   Scar Luna MA in my presence and it is both accurate and complete

## 2019-08-16 DIAGNOSIS — R52 CHRONIC GENERALIZED PAIN: ICD-10-CM

## 2019-08-16 DIAGNOSIS — G89.29 CHRONIC GENERALIZED PAIN: ICD-10-CM

## 2019-08-16 LAB
HCT VFR BLD CALC: 35.1 % (ref 36–48)
HEMOGLOBIN: 11.7 G/DL (ref 12–16)
MCH RBC QN AUTO: 33.4 PG (ref 26–34)
MCHC RBC AUTO-ENTMCNC: 33.4 G/DL (ref 31–36)
MCV RBC AUTO: 100.2 FL (ref 80–100)
PDW BLD-RTO: 13.2 % (ref 12.4–15.4)
PLATELET # BLD: 208 K/UL (ref 135–450)
PMV BLD AUTO: 9.9 FL (ref 5–10.5)
RBC # BLD: 3.5 M/UL (ref 4–5.2)
WBC # BLD: 8 K/UL (ref 4–11)

## 2019-08-23 ENCOUNTER — HOSPITAL ENCOUNTER (EMERGENCY)
Age: 84
Discharge: HOME | End: 2019-08-23
Attending: EMERGENCY MEDICINE
Payer: MEDICARE

## 2019-08-23 VITALS
RESPIRATION RATE: 16 BRPM | HEIGHT: 66 IN | HEART RATE: 71 BPM | BODY MASS INDEX: 31.92 KG/M2 | WEIGHT: 198.63 LBS | TEMPERATURE: 98.7 F | DIASTOLIC BLOOD PRESSURE: 67 MMHG | SYSTOLIC BLOOD PRESSURE: 137 MMHG | OXYGEN SATURATION: 93 %

## 2019-08-23 DIAGNOSIS — R04.0 EPISTAXIS: Primary | ICD-10-CM

## 2019-08-23 PROCEDURE — 4500000023 HC ED LEVEL 3 PROCEDURE

## 2019-08-23 PROCEDURE — 6370000000 HC RX 637 (ALT 250 FOR IP): Performed by: EMERGENCY MEDICINE

## 2019-08-23 PROCEDURE — 99283 EMERGENCY DEPT VISIT LOW MDM: CPT

## 2019-08-23 RX ORDER — CEPHALEXIN 500 MG/1
500 CAPSULE ORAL 3 TIMES DAILY
Qty: 21 CAPSULE | Refills: 0 | Status: SHIPPED | OUTPATIENT
Start: 2019-08-23 | End: 2019-08-30

## 2019-08-23 RX ADMIN — COCAINE HYDROCHLORIDE: 40 SOLUTION TOPICAL at 02:29

## 2019-08-23 NOTE — ED PROVIDER NOTES
anterior pack was placed with resolution of the symptoms and patient was placed on Keflex 500 mg 3 times daily with ear nose and throat follow-up within 3 to 4 days. Hypertension:  Patient was hypertensive during the ER visit today. There are no signs of hypertensive emergency or evidence of end organ damage by history or physical exam.  These findings were discussed with the patient and advised to follow up with primary care physician to further assess and treat hypertension in the outpatient setting. Patient was given scripts for the following medications. I counseled patient how to take these medications. New Prescriptions    CEPHALEXIN (KEFLEX) 500 MG CAPSULE    Take 1 capsule by mouth 3 times daily for 7 days         CLINICAL IMPRESSION  1. Epistaxis        Blood pressure (!) 167/95, pulse 76, temperature 98.5 °F (36.9 °C), temperature source Oral, resp. rate 16, height 5' 6\" (1.676 m), weight 198 lb 10.2 oz (90.1 kg), SpO2 93 %, not currently breastfeeding. Follow-up with:  [address] [facility]  [facility]  In 3 days          Rah Knowles MD  08/23/19 8082

## 2019-08-23 NOTE — ED NOTES
Gave patient and daughter discharge instructions. They both state understanding.  Patient discharged to home      Sandoval Villa RN  08/23/19 9075

## 2019-08-29 ENCOUNTER — TELEPHONE (OUTPATIENT)
Dept: PAIN MANAGEMENT | Age: 84
End: 2019-08-29

## 2019-08-30 ENCOUNTER — CARE COORDINATION (OUTPATIENT)
Dept: CARE COORDINATION | Age: 84
End: 2019-08-30

## 2019-09-03 DIAGNOSIS — Z12.11 SCREENING FOR COLON CANCER: Primary | ICD-10-CM

## 2019-09-03 LAB
CONTROL: NORMAL
HEMOCCULT STL QL: NEGATIVE

## 2019-09-03 PROCEDURE — 82274 ASSAY TEST FOR BLOOD FECAL: CPT | Performed by: FAMILY MEDICINE

## 2019-09-22 ENCOUNTER — HOSPITAL ENCOUNTER (EMERGENCY)
Age: 84
Discharge: HOME | End: 2019-09-22
Attending: EMERGENCY MEDICINE
Payer: MEDICARE

## 2019-09-22 DIAGNOSIS — R04.0 EPISTAXIS: Primary | ICD-10-CM

## 2019-09-22 PROCEDURE — 6370000000 HC RX 637 (ALT 250 FOR IP): Performed by: EMERGENCY MEDICINE

## 2019-09-22 PROCEDURE — 99283 EMERGENCY DEPT VISIT LOW MDM: CPT

## 2019-09-22 PROCEDURE — 4500000023 HC ED LEVEL 3 PROCEDURE

## 2019-09-22 RX ORDER — CEPHALEXIN 500 MG/1
500 CAPSULE ORAL 3 TIMES DAILY
Qty: 21 CAPSULE | Refills: 0 | Status: SHIPPED | OUTPATIENT
Start: 2019-09-22 | End: 2019-09-29

## 2019-09-22 RX ORDER — CEPHALEXIN 500 MG/1
500 CAPSULE ORAL ONCE
Status: COMPLETED | OUTPATIENT
Start: 2019-09-22 | End: 2019-09-22

## 2019-09-22 RX ADMIN — CEPHALEXIN 500 MG: 500 CAPSULE ORAL at 23:10

## 2019-09-22 RX ADMIN — COCAINE HYDROCHLORIDE: 40 SOLUTION TOPICAL at 21:55

## 2019-09-22 ASSESSMENT — PAIN SCALES - GENERAL
PAINLEVEL_OUTOF10: 0

## 2019-09-23 VITALS
OXYGEN SATURATION: 98 % | SYSTOLIC BLOOD PRESSURE: 144 MMHG | DIASTOLIC BLOOD PRESSURE: 84 MMHG | RESPIRATION RATE: 16 BRPM | TEMPERATURE: 98.5 F | HEART RATE: 85 BPM

## 2019-09-23 NOTE — ED PROVIDER NOTES
CHIEF COMPLAINT  Chief Complaint   Patient presents with    Epistaxis     since 5pm       HISTORY OF PRESENT ILLNESS  Allen Rob is a 81 y.o. female who presents to the ED complaining of a 4-hour history of nosebleed, left-sided, intermittent with a history of recurrent nosebleeds in the past.  Her last nosebleed was in August in which a pack was placed. Patient is on aspirin and Plavix. Patient has had no other bleeding manifestations. No petechiae. No bruising. Occasionally patient reports that she feels the blood running down the back of her throat. No other complaints, modifying factors or associated symptoms. Nursing notes reviewed. Past Medical History:   Diagnosis Date    Arthritis     [facility]ronic obstructive [facility]) ([facility] [address]     Heart disease     Hypertension     Lung disease     Renal insufficiency     [facility]      Past Surgical History:   Procedure Laterality Date    CATARACT REMOVAL      CHOLECYSTECTOMY       Family History   Problem Relation Age of Onset    Cancer Mother     Tuberculosis Father     Cancer Sister     Heart Disease Brother     Cancer Brother     Heart Attack Brother      Social History     Socioeconomic History    Marital status:       Spouse name: Not on file    Number of children: 1    Years of education: Not on file    Highest education level: Not on file   Occupational History    Not on file   Social Needs    Financial resource strain: Not on file    Food insecurity:     Worry: Not on file     Inability: Not on file    Transportation needs:     Medical: Not on file     Non-medical: Not on file   Tobacco Use    Smoking status: Former Smoker     Last attempt to quit: 1982     Years since quittin.7    Smokeless tobacco: Never Used   Substance and Sexual Activity    Alcohol use: No     Alcohol/week: 0.0 standard drinks    Drug use: No    Sexual activity: Not on file   Lifestyle    Physical activity:

## 2019-10-08 DIAGNOSIS — Z23 NEED FOR INFLUENZA VACCINATION: Primary | ICD-10-CM

## 2019-10-08 PROCEDURE — 90653 IIV ADJUVANT VACCINE IM: CPT | Performed by: FAMILY MEDICINE

## 2019-10-08 PROCEDURE — G0008 ADMIN INFLUENZA VIRUS VAC: HCPCS | Performed by: FAMILY MEDICINE

## 2019-12-05 NOTE — PROGRESS NOTES
12/5/2019    Yoko Goodman is a 81 y.o. female    Chief Complaint   Patient presents with    Medication Check     follow up on medication-xanax working ok       SHEN Leos is a 81 y.o. female presenting today with a chief complaint of needing her Xanax refilled. Last urine drug screen was done July 16, 2019  Pill count : 19  Bottle date 11/14/19  oaars report run today                Review of Systems   Constitutional: Negative for chills, fatigue and fever. Respiratory: Negative for shortness of breath. Cardiovascular: Negative for chest pain. Gastrointestinal: Negative for abdominal pain. Musculoskeletal: Negative for myalgias. OBJECTIVE  Physical Exam  Constitutional:       General: She is not in acute distress. Appearance: She is well-developed. She is not diaphoretic. HENT:      Head: Normocephalic and atraumatic. Right Ear: No drainage. Left Ear: No drainage. Nose: Mucosal edema present. Mouth/Throat:      Pharynx: No oropharyngeal exudate or posterior oropharyngeal erythema. Tonsils: No tonsillar abscesses. Neck:      Musculoskeletal: Normal range of motion and neck supple. Thyroid: No thyromegaly. Comments: +shotty cervical adenopathy  Cardiovascular:      Rate and Rhythm: Normal rate and regular rhythm. Pulmonary:      Effort: Pulmonary effort is normal. No respiratory distress. Breath sounds: Normal breath sounds. No wheezing. Skin:     General: Skin is warm and dry. Neurological:      Mental Status: She is alert. Allergies  Acetaminophen-codeine and Codeine    Current Outpatient Medications   Medication Sig Dispense Refill    [START ON 2/22/2020] ALPRAZolam (XANAX) 1 MG tablet TAKE ONE TABLET BY MOUTH TWICE A DAY AS NEEDED FOR ANXIETY. NO ACOHOL. NO DRIVING. Do not take with percocet.  60 tablet 0    levothyroxine (SYNTHROID) 100 MCG tablet TAKE ONE TABLET BY MOUTH DAILY 90 tablet 0    allopurinol Take 81 mg by mouth daily.  pregabalin (LYRICA) 50 MG capsule Take 1 capsule by mouth daily for 30 days. 90 capsule 0    diclofenac sodium (VOLTAREN) 1 % GEL Apply 2-4 grams to right knee TID 5 Tube 1    oxyCODONE-acetaminophen (PERCOCET) 7.5-325 MG per tablet Take 1 tablet by mouth every 6 hours as needed for Pain for up to 56 days. Max 2-3 PER DAY 90 tablet 0     Current Facility-Administered Medications   Medication Dose Route Frequency Provider Last Rate Last Dose    cyanocobalamin injection 1,000 mcg  1,000 mcg Intramuscular Once Beena Lyle DO        triamcinolone acetonide (KENALOG-40) injection 40 mg  40 mg Intramuscular Once Trevor Ahuja MD         Vitals:    12/05/19 0958   BP: 110/68   Temp: 97.7 °F (36.5 °C)      Body mass index is 31.6 kg/m². Controlled Substance Monitoring:    Acute and Chronic Pain Monitoring:   RX Monitoring 10/8/2019   Attestation -   Periodic Controlled Substance Monitoring Possible medication side effects, risk of tolerance/dependence & alternative treatments discussed. ;No signs of potential drug abuse or diversion identified. Chronic Pain > 80 MEDD -               ASSESSMENT AND PLAN     Diagnosis Orders   1. Primary insomnia  ALPRAZolam (XANAX) 1 MG tablet    DISCONTINUED: ALPRAZolam (XANAX) 1 MG tablet    DISCONTINUED: ALPRAZolam (XANAX) 1 MG tablet       Pt is stable on current meds. Continue with current meds. New meds this visit:    Orders Placed This Encounter   Medications    DISCONTD: ALPRAZolam (XANAX) 1 MG tablet     Sig: TAKE ONE TABLET BY MOUTH TWICE A DAY AS NEEDED FOR ANXIETY. NO ACOHOL. NO DRIVING. Do not take with percocet. Dispense:  60 tablet     Refill:  0     Do not refill before 12/23/19    DISCONTD: ALPRAZolam (XANAX) 1 MG tablet     Sig: TAKE ONE TABLET BY MOUTH TWICE A DAY AS NEEDED FOR ANXIETY. NO ACOHOL. NO DRIVING. Do not take with percocet.      Dispense:  60 tablet     Refill:  0     Do not refill before 1/22/20    ALPRAZolam (XANAX) 1 MG tablet     Sig: TAKE ONE TABLET BY MOUTH TWICE A DAY AS NEEDED FOR ANXIETY. NO ACOHOL. NO DRIVING. Do not take with percocet. Dispense:  60 tablet     Refill:  0     Do not refill before 2/22/20     New orders placed this visit:  No orders of the defined types were placed in this encounter. No follow-ups on file. continue with the following meds:    Outpatient Encounter Medications as of 12/5/2019   Medication Sig Dispense Refill    [START ON 2/22/2020] ALPRAZolam (XANAX) 1 MG tablet TAKE ONE TABLET BY MOUTH TWICE A DAY AS NEEDED FOR ANXIETY. NO ACOHOL. NO DRIVING. Do not take with percocet. 60 tablet 0    levothyroxine (SYNTHROID) 100 MCG tablet TAKE ONE TABLET BY MOUTH DAILY 90 tablet 0    [DISCONTINUED] diclofenac sodium (VOLTAREN) 1 % GEL Apply 2-4 grams to right knee TID 5 Tube 1    [DISCONTINUED] oxyCODONE-acetaminophen (PERCOCET) 7.5-325 MG per tablet Take 1 tablet by mouth every 6 hours as needed for Pain for up to 56 days. Max 2-3 PER DAY 90 tablet 0    [DISCONTINUED] pregabalin (LYRICA) 50 MG capsule Take 1 capsule by mouth daily for 30 days.  90 capsule 0    allopurinol (ZYLOPRIM) 300 MG tablet Take 1 tablet by mouth daily 90 tablet 1    vitamin D (ERGOCALCIFEROL) 37348 units CAPS capsule TAKE ONE CAPSULE BY MOUTH ONCE WEEKLY 12 capsule 3    alendronate (FOSAMAX) 70 MG tablet Take 70 mg by mouth every 7 days      pantoprazole (PROTONIX) 20 MG tablet Take 20 mg by mouth daily      ranolazine (RANEXA) 500 MG extended release tablet Take 500 mg by mouth 2 times daily      atorvastatin (LIPITOR) 80 MG tablet Take 80 mg by mouth daily      amLODIPine (NORVASC) 5 MG tablet Take 2.5 mg by mouth daily       carBAMazepine (TEGRETOL-XR) 100 MG extended release tablet Take 1 tablet by mouth 2 times daily (Patient taking differently: Take 300 mg by mouth 3 times daily Indications: Taking 800 mg daily, 300mg, 200mg, 300mg ) 100 tablet 3    Respiratory Therapy Supplies (NEBULIZER COMPRESSOR) KIT 1 kit by Does not apply route once      isosorbide mononitrate (IMDUR) 30 MG CR tablet Take 1 tablet by mouth daily. (Patient taking differently: Take 30 mg by mouth 2 times daily.) 90 tablet 0    nitroGLYCERIN (NITROSTAT) 0.4 MG SL tablet 1 SL q 5 min prn chest pain. After 3 tabs, if pain persists, go to ER. 25 tablet 0    azithromycin (ZITHROMAX) 500 MG tablet Take 500 mg by mouth. m-w-f      metoprolol (LOPRESSOR) 25 MG tablet Take 50 mg by mouth 2 times daily.  PREDNISONE Take 10 mg by mouth daily at 1800       tiotropium (SPIRIVA) 18 MCG inhalation capsule Inhale 18 mcg into the lungs daily.  montelukast (SINGULAIR) 10 MG tablet Take 10 mg by mouth nightly.  psyllium (METAMUCIL SMOOTH TEXTURE) 28 % packet Take 1 packet by mouth 2 times daily. Take with full glass of h20 or juice       albuterol (PROVENTIL HFA;VENTOLIN HFA) 108 (90 BASE) MCG/ACT inhaler Inhale 2 puffs into the lungs every 6 hours as needed.  Potassium Chloride (KLOR-CON PO) Take 20 mEq by mouth daily       clopidogrel (PLAVIX) 75 MG tablet Take 75 mg by mouth daily.  aspirin 81 MG EC tablet Take 81 mg by mouth daily.  [DISCONTINUED] ALPRAZolam (XANAX) 1 MG tablet TAKE ONE TABLET BY MOUTH TWICE A DAY AS NEEDED FOR ANXIETY. NO ACOHOL. NO DRIVING. Do not take with percocet. 60 tablet 0    [DISCONTINUED] ALPRAZolam (XANAX) 1 MG tablet TAKE ONE TABLET BY MOUTH TWICE A DAY AS NEEDED FOR ANXIETY. NO ACOHOL. NO DRIVING. Do not take with percocet. 60 tablet 0    [DISCONTINUED] ALPRAZolam (XANAX) 1 MG tablet TAKE ONE TABLET BY MOUTH TWICE A DAY AS NEEDED FOR ANXIETY. NO ACOHOL. NO DRIVING. Do not take with percocet. 60 tablet 0    [DISCONTINUED] allopurinol (ZYLOPRIM) 300 MG tablet Take 300 mg by mouth      [DISCONTINUED] fluticasone-salmeterol (ADVAIR DISKUS) 250-50 MCG/DOSE AEPB Inhale 1 puff into the lungs 2 times daily.        Facility-Administered Encounter Medications as of 12/5/2019 Medication Dose Route Frequency Provider Last Rate Last Dose    cyanocobalamin injection 1,000 mcg  1,000 mcg Intramuscular Once Quentin Olivier,         triamcinolone acetonide (KENALOG-40) injection 40 mg  40 mg Intramuscular Once MD Quentin Isabel, D.O.

## 2020-01-01 ENCOUNTER — TELEPHONE (OUTPATIENT)
Dept: FAMILY MEDICINE CLINIC | Age: 85
End: 2020-01-01

## 2020-01-01 ENCOUNTER — HOSPITAL ENCOUNTER (EMERGENCY)
Age: 85
Discharge: HOME | End: 2020-08-01
Attending: EMERGENCY MEDICINE
Payer: MEDICARE

## 2020-01-01 ENCOUNTER — VIRTUAL VISIT (OUTPATIENT)
Dept: FAMILY MEDICINE CLINIC | Age: 85
End: 2020-01-01
Payer: MEDICARE

## 2020-01-01 ENCOUNTER — VIRTUAL VISIT (OUTPATIENT)
Dept: PAIN MANAGEMENT | Age: 85
End: 2020-01-01
Payer: MEDICARE

## 2020-01-01 ENCOUNTER — OFFICE VISIT (OUTPATIENT)
Dept: FAMILY MEDICINE CLINIC | Age: 85
End: 2020-01-01
Payer: MEDICARE

## 2020-01-01 ENCOUNTER — APPOINTMENT (OUTPATIENT)
Dept: GENERAL RADIOLOGY | Age: 85
DRG: 177 | End: 2020-01-01
Payer: MEDICARE

## 2020-01-01 ENCOUNTER — HOSPITAL ENCOUNTER (INPATIENT)
Age: 85
LOS: 11 days | DRG: 177 | End: 2020-11-01
Attending: EMERGENCY MEDICINE | Admitting: STUDENT IN AN ORGANIZED HEALTH CARE EDUCATION/TRAINING PROGRAM
Payer: MEDICARE

## 2020-01-01 ENCOUNTER — APPOINTMENT (OUTPATIENT)
Dept: CT IMAGING | Age: 85
DRG: 177 | End: 2020-01-01
Payer: MEDICARE

## 2020-01-01 ENCOUNTER — OFFICE VISIT (OUTPATIENT)
Dept: PAIN MANAGEMENT | Age: 85
End: 2020-01-01
Payer: MEDICARE

## 2020-01-01 ENCOUNTER — IMMUNIZATION (OUTPATIENT)
Dept: FAMILY MEDICINE CLINIC | Age: 85
End: 2020-01-01
Payer: MEDICARE

## 2020-01-01 VITALS
SYSTOLIC BLOOD PRESSURE: 124 MMHG | DIASTOLIC BLOOD PRESSURE: 72 MMHG | BODY MASS INDEX: 30.29 KG/M2 | WEIGHT: 193 LBS | HEIGHT: 67 IN | TEMPERATURE: 98.1 F

## 2020-01-01 VITALS
OXYGEN SATURATION: 90 % | HEART RATE: 78 BPM | HEIGHT: 66 IN | TEMPERATURE: 96.9 F | BODY MASS INDEX: 30.01 KG/M2 | DIASTOLIC BLOOD PRESSURE: 58 MMHG | RESPIRATION RATE: 20 BRPM | SYSTOLIC BLOOD PRESSURE: 116 MMHG | WEIGHT: 186.73 LBS

## 2020-01-01 VITALS
WEIGHT: 195 LBS | DIASTOLIC BLOOD PRESSURE: 88 MMHG | BODY MASS INDEX: 31.47 KG/M2 | SYSTOLIC BLOOD PRESSURE: 157 MMHG | HEART RATE: 89 BPM

## 2020-01-01 VITALS
SYSTOLIC BLOOD PRESSURE: 169 MMHG | TEMPERATURE: 99.1 F | RESPIRATION RATE: 16 BRPM | HEIGHT: 66 IN | WEIGHT: 189.56 LBS | OXYGEN SATURATION: 94 % | BODY MASS INDEX: 30.46 KG/M2 | HEART RATE: 99 BPM | DIASTOLIC BLOOD PRESSURE: 90 MMHG

## 2020-01-01 VITALS
BODY MASS INDEX: 30.05 KG/M2 | HEART RATE: 88 BPM | SYSTOLIC BLOOD PRESSURE: 122 MMHG | DIASTOLIC BLOOD PRESSURE: 70 MMHG | WEIGHT: 187 LBS | TEMPERATURE: 97.1 F | HEIGHT: 66 IN

## 2020-01-01 DIAGNOSIS — R42 DIZZINESS: ICD-10-CM

## 2020-01-01 DIAGNOSIS — E03.9 ACQUIRED HYPOTHYROIDISM: ICD-10-CM

## 2020-01-01 DIAGNOSIS — R89.9 ABNORMAL LABORATORY TEST RESULT: ICD-10-CM

## 2020-01-01 DIAGNOSIS — Z79.899 OTHER LONG TERM (CURRENT) DRUG THERAPY: ICD-10-CM

## 2020-01-01 LAB
A/G RATIO: 0.7 (ref 1.1–2.2)
A/G RATIO: 0.9 (ref 1.1–2.2)
A/G RATIO: 0.9 (ref 1.1–2.2)
A/G RATIO: 1 (ref 1.1–2.2)
A/G RATIO: 1.1 (ref 1.1–2.2)
A/G RATIO: 1.1 (ref 1.1–2.2)
A/G RATIO: 1.2 (ref 1.1–2.2)
A/G RATIO: 1.3 (ref 1.1–2.2)
A/G RATIO: 1.3 (ref 1.1–2.2)
A/G RATIO: 1.7 (ref 1.1–2.2)
ABO/RH: NORMAL
ALBUMIN SERPL-MCNC: 2.5 G/DL (ref 3.4–5)
ALBUMIN SERPL-MCNC: 2.8 G/DL (ref 3.4–5)
ALBUMIN SERPL-MCNC: 2.9 G/DL (ref 3.4–5)
ALBUMIN SERPL-MCNC: 3 G/DL (ref 3.4–5)
ALBUMIN SERPL-MCNC: 3.2 G/DL (ref 3.4–5)
ALBUMIN SERPL-MCNC: 3.5 G/DL (ref 3.4–5)
ALBUMIN SERPL-MCNC: 3.5 G/DL (ref 3.4–5)
ALBUMIN SERPL-MCNC: 4.3 G/DL (ref 3.4–5)
ALP BLD-CCNC: 103 U/L (ref 40–129)
ALP BLD-CCNC: 62 U/L (ref 40–129)
ALP BLD-CCNC: 69 U/L (ref 40–129)
ALP BLD-CCNC: 72 U/L (ref 40–129)
ALP BLD-CCNC: 73 U/L (ref 40–129)
ALP BLD-CCNC: 79 U/L (ref 40–129)
ALP BLD-CCNC: 79 U/L (ref 40–129)
ALP BLD-CCNC: 83 U/L (ref 40–129)
ALP BLD-CCNC: 83 U/L (ref 40–129)
ALP BLD-CCNC: 86 U/L (ref 40–129)
ALP BLD-CCNC: 92 U/L (ref 40–129)
ALP BLD-CCNC: 93 U/L (ref 40–129)
ALP BLD-CCNC: 97 U/L (ref 40–129)
ALT SERPL-CCNC: 10 U/L (ref 10–40)
ALT SERPL-CCNC: 11 U/L (ref 10–40)
ALT SERPL-CCNC: 11 U/L (ref 10–40)
ALT SERPL-CCNC: 12 U/L (ref 10–40)
ALT SERPL-CCNC: 13 U/L (ref 10–40)
ALT SERPL-CCNC: 13 U/L (ref 10–40)
ALT SERPL-CCNC: 15 U/L (ref 10–40)
ALT SERPL-CCNC: 15 U/L (ref 10–40)
ALT SERPL-CCNC: 16 U/L (ref 10–40)
ALT SERPL-CCNC: <5 U/L (ref 10–40)
ANION GAP SERPL CALCULATED.3IONS-SCNC: 10 MMOL/L (ref 3–16)
ANION GAP SERPL CALCULATED.3IONS-SCNC: 11 MMOL/L (ref 3–16)
ANION GAP SERPL CALCULATED.3IONS-SCNC: 12 MMOL/L (ref 3–16)
ANION GAP SERPL CALCULATED.3IONS-SCNC: 13 MMOL/L (ref 3–16)
ANION GAP SERPL CALCULATED.3IONS-SCNC: 14 MMOL/L (ref 3–16)
ANION GAP SERPL CALCULATED.3IONS-SCNC: 6 MMOL/L (ref 3–16)
ANION GAP SERPL CALCULATED.3IONS-SCNC: 7 MMOL/L (ref 3–16)
ANION GAP SERPL CALCULATED.3IONS-SCNC: 7 MMOL/L (ref 3–16)
ANION GAP SERPL CALCULATED.3IONS-SCNC: 8 MMOL/L (ref 3–16)
ANION GAP SERPL CALCULATED.3IONS-SCNC: 9 MMOL/L (ref 3–16)
ANTIBODY SCREEN: NORMAL
APTT: 24.5 SEC (ref 24.2–36.2)
APTT: 24.8 SEC (ref 24.2–36.2)
APTT: 25.9 SEC (ref 24.2–36.2)
AST SERPL-CCNC: 16 U/L (ref 15–37)
AST SERPL-CCNC: 16 U/L (ref 15–37)
AST SERPL-CCNC: 18 U/L (ref 15–37)
AST SERPL-CCNC: 19 U/L (ref 15–37)
AST SERPL-CCNC: 22 U/L (ref 15–37)
AST SERPL-CCNC: 22 U/L (ref 15–37)
AST SERPL-CCNC: 23 U/L (ref 15–37)
AST SERPL-CCNC: 29 U/L (ref 15–37)
AST SERPL-CCNC: 31 U/L (ref 15–37)
AST SERPL-CCNC: 36 U/L (ref 15–37)
AST SERPL-CCNC: <5 U/L (ref 15–37)
BASOPHILS ABSOLUTE: 0 K/UL (ref 0–0.2)
BASOPHILS RELATIVE PERCENT: 0.1 %
BASOPHILS RELATIVE PERCENT: 0.2 %
BASOPHILS RELATIVE PERCENT: 0.3 %
BASOPHILS RELATIVE PERCENT: 0.5 %
BILIRUB SERPL-MCNC: 0.3 MG/DL (ref 0–1)
BILIRUB SERPL-MCNC: <0.2 MG/DL (ref 0–1)
BILIRUBIN URINE: ABNORMAL
BLOOD BANK DISPENSE STATUS: NORMAL
BLOOD BANK DISPENSE STATUS: NORMAL
BLOOD BANK PRODUCT CODE: NORMAL
BLOOD BANK PRODUCT CODE: NORMAL
BLOOD CULTURE, ROUTINE: NORMAL
BLOOD, URINE: NEGATIVE
BPU ID: NORMAL
BPU ID: NORMAL
BUN BLDV-MCNC: 10 MG/DL (ref 7–20)
BUN BLDV-MCNC: 11 MG/DL (ref 7–20)
BUN BLDV-MCNC: 13 MG/DL (ref 7–20)
BUN BLDV-MCNC: 14 MG/DL (ref 7–20)
BUN BLDV-MCNC: 16 MG/DL (ref 7–20)
BUN BLDV-MCNC: 16 MG/DL (ref 7–20)
BUN BLDV-MCNC: 17 MG/DL (ref 7–20)
BUN BLDV-MCNC: 18 MG/DL (ref 7–20)
BUN BLDV-MCNC: 19 MG/DL (ref 7–20)
BUN BLDV-MCNC: 19 MG/DL (ref 7–20)
CALCIUM SERPL-MCNC: 7.8 MG/DL (ref 8.3–10.6)
CALCIUM SERPL-MCNC: 7.9 MG/DL (ref 8.3–10.6)
CALCIUM SERPL-MCNC: 7.9 MG/DL (ref 8.3–10.6)
CALCIUM SERPL-MCNC: 8 MG/DL (ref 8.3–10.6)
CALCIUM SERPL-MCNC: 8.3 MG/DL (ref 8.3–10.6)
CALCIUM SERPL-MCNC: 8.4 MG/DL (ref 8.3–10.6)
CALCIUM SERPL-MCNC: 8.5 MG/DL (ref 8.3–10.6)
CALCIUM SERPL-MCNC: 8.7 MG/DL (ref 8.3–10.6)
CHLORIDE BLD-SCNC: 91 MMOL/L (ref 99–110)
CHLORIDE BLD-SCNC: 93 MMOL/L (ref 99–110)
CHLORIDE BLD-SCNC: 94 MMOL/L (ref 99–110)
CHLORIDE BLD-SCNC: 94 MMOL/L (ref 99–110)
CHLORIDE BLD-SCNC: 95 MMOL/L (ref 99–110)
CHLORIDE BLD-SCNC: 96 MMOL/L (ref 99–110)
CHLORIDE BLD-SCNC: 97 MMOL/L (ref 99–110)
CHLORIDE BLD-SCNC: 98 MMOL/L (ref 99–110)
CHLORIDE BLD-SCNC: 98 MMOL/L (ref 99–110)
CHLORIDE BLD-SCNC: 99 MMOL/L (ref 99–110)
CLARITY: CLEAR
CO2: 26 MMOL/L (ref 21–32)
CO2: 28 MMOL/L (ref 21–32)
CO2: 29 MMOL/L (ref 21–32)
CO2: 30 MMOL/L (ref 21–32)
CO2: 30 MMOL/L (ref 21–32)
CO2: 31 MMOL/L (ref 21–32)
CO2: 32 MMOL/L (ref 21–32)
CO2: 34 MMOL/L (ref 21–32)
COLOR: ABNORMAL
CREAT SERPL-MCNC: 0.8 MG/DL (ref 0.6–1.2)
CREAT SERPL-MCNC: 0.9 MG/DL (ref 0.6–1.2)
CREAT SERPL-MCNC: 1 MG/DL (ref 0.6–1.2)
CREAT SERPL-MCNC: 1.1 MG/DL (ref 0.6–1.2)
CREAT SERPL-MCNC: 1.4 MG/DL (ref 0.6–1.2)
CULTURE, BLOOD 2: NORMAL
CULTURE, RESPIRATORY: ABNORMAL
CULTURE, RESPIRATORY: ABNORMAL
D DIMER: 1020 NG/ML DDU (ref 0–229)
D DIMER: 1496 NG/ML DDU (ref 0–229)
D DIMER: 1838 NG/ML DDU (ref 0–229)
D DIMER: 2106 NG/ML DDU (ref 0–229)
D DIMER: 680 NG/ML DDU (ref 0–229)
D DIMER: 706 NG/ML DDU (ref 0–229)
D DIMER: 750 NG/ML DDU (ref 0–229)
D DIMER: 757 NG/ML DDU (ref 0–229)
D DIMER: 762 NG/ML DDU (ref 0–229)
D DIMER: 798 NG/ML DDU (ref 0–229)
DESCRIPTION BLOOD BANK: NORMAL
DESCRIPTION BLOOD BANK: NORMAL
EKG ATRIAL RATE: 101 BPM
EKG ATRIAL RATE: 68 BPM
EKG DIAGNOSIS: NORMAL
EKG DIAGNOSIS: NORMAL
EKG P AXIS: 57 DEGREES
EKG P AXIS: 69 DEGREES
EKG P-R INTERVAL: 178 MS
EKG P-R INTERVAL: 184 MS
EKG Q-T INTERVAL: 340 MS
EKG Q-T INTERVAL: 406 MS
EKG QRS DURATION: 70 MS
EKG QRS DURATION: 80 MS
EKG QTC CALCULATION (BAZETT): 431 MS
EKG QTC CALCULATION (BAZETT): 440 MS
EKG R AXIS: -5 DEGREES
EKG R AXIS: 2 DEGREES
EKG T AXIS: 39 DEGREES
EKG T AXIS: 42 DEGREES
EKG VENTRICULAR RATE: 101 BPM
EKG VENTRICULAR RATE: 68 BPM
EOSINOPHILS ABSOLUTE: 0 K/UL (ref 0–0.6)
EOSINOPHILS ABSOLUTE: 0.1 K/UL (ref 0–0.6)
EOSINOPHILS ABSOLUTE: 0.1 K/UL (ref 0–0.6)
EOSINOPHILS RELATIVE PERCENT: 0 %
EOSINOPHILS RELATIVE PERCENT: 0 %
EOSINOPHILS RELATIVE PERCENT: 0.1 %
EOSINOPHILS RELATIVE PERCENT: 0.2 %
EOSINOPHILS RELATIVE PERCENT: 0.3 %
EOSINOPHILS RELATIVE PERCENT: 0.4 %
EOSINOPHILS RELATIVE PERCENT: 0.5 %
EOSINOPHILS RELATIVE PERCENT: 0.9 %
EOSINOPHILS RELATIVE PERCENT: 1.1 %
EPITHELIAL CELLS, UA: 0 /HPF (ref 0–5)
ESTIMATED AVERAGE GLUCOSE: 139.9 MG/DL
ESTIMATED AVERAGE GLUCOSE: 145.6 MG/DL
FERRITIN: 31.6 NG/ML (ref 15–150)
FIBRINOGEN: 499 MG/DL (ref 200–397)
FIBRINOGEN: 565 MG/DL (ref 200–397)
FIBRINOGEN: 583 MG/DL (ref 200–397)
GFR AFRICAN AMERICAN: 43
GFR AFRICAN AMERICAN: 57
GFR AFRICAN AMERICAN: >60
GFR NON-AFRICAN AMERICAN: 36
GFR NON-AFRICAN AMERICAN: 47
GFR NON-AFRICAN AMERICAN: 53
GFR NON-AFRICAN AMERICAN: 60
GFR NON-AFRICAN AMERICAN: >60
GLOBULIN: 2.5 G/DL
GLOBULIN: 2.6 G/DL
GLOBULIN: 2.7 G/DL
GLOBULIN: 2.7 G/DL
GLOBULIN: 2.8 G/DL
GLOBULIN: 2.9 G/DL
GLOBULIN: 2.9 G/DL
GLOBULIN: 3.2 G/DL
GLOBULIN: 3.3 G/DL
GLOBULIN: 3.4 G/DL
GLUCOSE BLD-MCNC: 100 MG/DL (ref 70–99)
GLUCOSE BLD-MCNC: 101 MG/DL (ref 70–99)
GLUCOSE BLD-MCNC: 101 MG/DL (ref 70–99)
GLUCOSE BLD-MCNC: 112 MG/DL (ref 70–99)
GLUCOSE BLD-MCNC: 118 MG/DL (ref 70–99)
GLUCOSE BLD-MCNC: 119 MG/DL (ref 70–99)
GLUCOSE BLD-MCNC: 120 MG/DL (ref 70–99)
GLUCOSE BLD-MCNC: 120 MG/DL (ref 70–99)
GLUCOSE BLD-MCNC: 123 MG/DL (ref 70–99)
GLUCOSE BLD-MCNC: 123 MG/DL (ref 70–99)
GLUCOSE BLD-MCNC: 130 MG/DL (ref 70–99)
GLUCOSE BLD-MCNC: 131 MG/DL (ref 70–99)
GLUCOSE BLD-MCNC: 133 MG/DL (ref 70–99)
GLUCOSE BLD-MCNC: 134 MG/DL (ref 70–99)
GLUCOSE BLD-MCNC: 137 MG/DL (ref 70–99)
GLUCOSE BLD-MCNC: 138 MG/DL (ref 70–99)
GLUCOSE BLD-MCNC: 139 MG/DL (ref 70–99)
GLUCOSE BLD-MCNC: 140 MG/DL (ref 70–99)
GLUCOSE BLD-MCNC: 147 MG/DL (ref 70–99)
GLUCOSE BLD-MCNC: 148 MG/DL (ref 70–99)
GLUCOSE BLD-MCNC: 151 MG/DL (ref 70–99)
GLUCOSE BLD-MCNC: 153 MG/DL (ref 70–99)
GLUCOSE BLD-MCNC: 162 MG/DL (ref 70–99)
GLUCOSE BLD-MCNC: 162 MG/DL (ref 70–99)
GLUCOSE BLD-MCNC: 167 MG/DL (ref 70–99)
GLUCOSE BLD-MCNC: 171 MG/DL (ref 70–99)
GLUCOSE BLD-MCNC: 172 MG/DL (ref 70–99)
GLUCOSE BLD-MCNC: 174 MG/DL (ref 70–99)
GLUCOSE BLD-MCNC: 176 MG/DL (ref 70–99)
GLUCOSE BLD-MCNC: 182 MG/DL (ref 70–99)
GLUCOSE BLD-MCNC: 182 MG/DL (ref 70–99)
GLUCOSE BLD-MCNC: 185 MG/DL (ref 70–99)
GLUCOSE BLD-MCNC: 186 MG/DL (ref 70–99)
GLUCOSE BLD-MCNC: 189 MG/DL (ref 70–99)
GLUCOSE BLD-MCNC: 196 MG/DL (ref 70–99)
GLUCOSE BLD-MCNC: 207 MG/DL (ref 70–99)
GLUCOSE BLD-MCNC: 209 MG/DL (ref 70–99)
GLUCOSE BLD-MCNC: 214 MG/DL (ref 70–99)
GLUCOSE BLD-MCNC: 237 MG/DL (ref 70–99)
GLUCOSE BLD-MCNC: 256 MG/DL (ref 70–99)
GLUCOSE BLD-MCNC: 261 MG/DL (ref 70–99)
GLUCOSE BLD-MCNC: 270 MG/DL (ref 70–99)
GLUCOSE BLD-MCNC: 277 MG/DL (ref 70–99)
GLUCOSE BLD-MCNC: 64 MG/DL (ref 70–99)
GLUCOSE BLD-MCNC: 69 MG/DL (ref 70–99)
GLUCOSE BLD-MCNC: 73 MG/DL (ref 70–99)
GLUCOSE BLD-MCNC: 83 MG/DL (ref 70–99)
GLUCOSE BLD-MCNC: 86 MG/DL (ref 70–99)
GLUCOSE BLD-MCNC: 88 MG/DL (ref 70–99)
GLUCOSE BLD-MCNC: 88 MG/DL (ref 70–99)
GLUCOSE BLD-MCNC: 89 MG/DL (ref 70–99)
GLUCOSE BLD-MCNC: 89 MG/DL (ref 70–99)
GLUCOSE BLD-MCNC: 90 MG/DL (ref 70–99)
GLUCOSE BLD-MCNC: 92 MG/DL (ref 70–99)
GLUCOSE BLD-MCNC: 94 MG/DL (ref 70–99)
GLUCOSE BLD-MCNC: 97 MG/DL (ref 70–99)
GLUCOSE BLD-MCNC: 97 MG/DL (ref 70–99)
GLUCOSE URINE: NEGATIVE MG/DL
GRAM STAIN RESULT: ABNORMAL
HBA1C MFR BLD: 6.5 %
HBA1C MFR BLD: 6.7 %
HCT VFR BLD CALC: 27.2 % (ref 36–48)
HCT VFR BLD CALC: 27.5 % (ref 36–48)
HCT VFR BLD CALC: 27.7 % (ref 36–48)
HCT VFR BLD CALC: 28.4 % (ref 36–48)
HCT VFR BLD CALC: 28.4 % (ref 36–48)
HCT VFR BLD CALC: 28.5 % (ref 36–48)
HCT VFR BLD CALC: 29 % (ref 36–48)
HCT VFR BLD CALC: 29.6 % (ref 36–48)
HCT VFR BLD CALC: 29.9 % (ref 36–48)
HCT VFR BLD CALC: 30.2 % (ref 36–48)
HCT VFR BLD CALC: 30.3 % (ref 36–48)
HCT VFR BLD CALC: 31.5 % (ref 36–48)
HCT VFR BLD CALC: 38.7 % (ref 36–48)
HEMOGLOBIN: 10.1 G/DL (ref 12–16)
HEMOGLOBIN: 12.6 G/DL (ref 12–16)
HEMOGLOBIN: 8.8 G/DL (ref 12–16)
HEMOGLOBIN: 8.9 G/DL (ref 12–16)
HEMOGLOBIN: 9.1 G/DL (ref 12–16)
HEMOGLOBIN: 9.1 G/DL (ref 12–16)
HEMOGLOBIN: 9.2 G/DL (ref 12–16)
HEMOGLOBIN: 9.4 G/DL (ref 12–16)
HEMOGLOBIN: 9.6 G/DL (ref 12–16)
HEMOGLOBIN: 9.7 G/DL (ref 12–16)
HEMOGLOBIN: 9.8 G/DL (ref 12–16)
HYALINE CASTS: 0 /LPF (ref 0–8)
INR BLD: 1.04 (ref 0.86–1.14)
INR BLD: 1.06 (ref 0.86–1.14)
INR BLD: 1.09 (ref 0.86–1.14)
IRON SATURATION: 6 % (ref 15–50)
IRON: 16 UG/DL (ref 37–145)
KETONES, URINE: NEGATIVE MG/DL
L. PNEUMOPHILA SEROGP 1 UR AG: NORMAL
LACTATE DEHYDROGENASE: 196 U/L (ref 100–190)
LACTIC ACID, SEPSIS: 0.8 MMOL/L (ref 0.4–1.9)
LACTIC ACID, SEPSIS: 1.4 MMOL/L (ref 0.4–1.9)
LEUKOCYTE ESTERASE, URINE: NEGATIVE
LV EF: 53 %
LVEF MODALITY: NORMAL
LYMPHOCYTES ABSOLUTE: 0.6 K/UL (ref 1–5.1)
LYMPHOCYTES ABSOLUTE: 0.6 K/UL (ref 1–5.1)
LYMPHOCYTES ABSOLUTE: 0.7 K/UL (ref 1–5.1)
LYMPHOCYTES ABSOLUTE: 0.7 K/UL (ref 1–5.1)
LYMPHOCYTES ABSOLUTE: 0.8 K/UL (ref 1–5.1)
LYMPHOCYTES ABSOLUTE: 1 K/UL (ref 1–5.1)
LYMPHOCYTES ABSOLUTE: 1.1 K/UL (ref 1–5.1)
LYMPHOCYTES ABSOLUTE: 1.2 K/UL (ref 1–5.1)
LYMPHOCYTES ABSOLUTE: 1.3 K/UL (ref 1–5.1)
LYMPHOCYTES RELATIVE PERCENT: 11.1 %
LYMPHOCYTES RELATIVE PERCENT: 11.6 %
LYMPHOCYTES RELATIVE PERCENT: 11.6 %
LYMPHOCYTES RELATIVE PERCENT: 14.1 %
LYMPHOCYTES RELATIVE PERCENT: 20 %
LYMPHOCYTES RELATIVE PERCENT: 20.5 %
LYMPHOCYTES RELATIVE PERCENT: 22.7 %
LYMPHOCYTES RELATIVE PERCENT: 23.3 %
LYMPHOCYTES RELATIVE PERCENT: 26.3 %
LYMPHOCYTES RELATIVE PERCENT: 4.9 %
LYMPHOCYTES RELATIVE PERCENT: 8.1 %
LYMPHOCYTES RELATIVE PERCENT: 8.2 %
LYMPHOCYTES RELATIVE PERCENT: 9 %
MAGNESIUM: 2.1 MG/DL (ref 1.8–2.4)
MAGNESIUM: 2.2 MG/DL (ref 1.8–2.4)
MCH RBC QN AUTO: 27.4 PG (ref 26–34)
MCH RBC QN AUTO: 27.6 PG (ref 26–34)
MCH RBC QN AUTO: 27.6 PG (ref 26–34)
MCH RBC QN AUTO: 27.7 PG (ref 26–34)
MCH RBC QN AUTO: 27.9 PG (ref 26–34)
MCH RBC QN AUTO: 27.9 PG (ref 26–34)
MCH RBC QN AUTO: 28 PG (ref 26–34)
MCH RBC QN AUTO: 28.1 PG (ref 26–34)
MCH RBC QN AUTO: 28.2 PG (ref 26–34)
MCH RBC QN AUTO: 32.1 PG (ref 26–34)
MCHC RBC AUTO-ENTMCNC: 31.6 G/DL (ref 31–36)
MCHC RBC AUTO-ENTMCNC: 31.8 G/DL (ref 31–36)
MCHC RBC AUTO-ENTMCNC: 31.9 G/DL (ref 31–36)
MCHC RBC AUTO-ENTMCNC: 32.1 G/DL (ref 31–36)
MCHC RBC AUTO-ENTMCNC: 32.2 G/DL (ref 31–36)
MCHC RBC AUTO-ENTMCNC: 32.3 G/DL (ref 31–36)
MCHC RBC AUTO-ENTMCNC: 32.4 G/DL (ref 31–36)
MCHC RBC AUTO-ENTMCNC: 32.4 G/DL (ref 31–36)
MCHC RBC AUTO-ENTMCNC: 32.5 G/DL (ref 31–36)
MCHC RBC AUTO-ENTMCNC: 32.6 G/DL (ref 31–36)
MCHC RBC AUTO-ENTMCNC: 32.8 G/DL (ref 31–36)
MCV RBC AUTO: 85.3 FL (ref 80–100)
MCV RBC AUTO: 85.7 FL (ref 80–100)
MCV RBC AUTO: 85.7 FL (ref 80–100)
MCV RBC AUTO: 85.8 FL (ref 80–100)
MCV RBC AUTO: 86.1 FL (ref 80–100)
MCV RBC AUTO: 86.2 FL (ref 80–100)
MCV RBC AUTO: 86.7 FL (ref 80–100)
MCV RBC AUTO: 86.9 FL (ref 80–100)
MCV RBC AUTO: 87.2 FL (ref 80–100)
MCV RBC AUTO: 87.8 FL (ref 80–100)
MCV RBC AUTO: 98.9 FL (ref 80–100)
MICROSCOPIC EXAMINATION: YES
MONOCYTES ABSOLUTE: 0.2 K/UL (ref 0–1.3)
MONOCYTES ABSOLUTE: 0.3 K/UL (ref 0–1.3)
MONOCYTES ABSOLUTE: 0.4 K/UL (ref 0–1.3)
MONOCYTES ABSOLUTE: 0.5 K/UL (ref 0–1.3)
MONOCYTES ABSOLUTE: 0.6 K/UL (ref 0–1.3)
MONOCYTES ABSOLUTE: 0.6 K/UL (ref 0–1.3)
MONOCYTES RELATIVE PERCENT: 3.2 %
MONOCYTES RELATIVE PERCENT: 3.3 %
MONOCYTES RELATIVE PERCENT: 4.3 %
MONOCYTES RELATIVE PERCENT: 4.8 %
MONOCYTES RELATIVE PERCENT: 4.9 %
MONOCYTES RELATIVE PERCENT: 5.3 %
MONOCYTES RELATIVE PERCENT: 7.1 %
MONOCYTES RELATIVE PERCENT: 7.5 %
MONOCYTES RELATIVE PERCENT: 7.6 %
MONOCYTES RELATIVE PERCENT: 8.5 %
MONOCYTES RELATIVE PERCENT: 9.1 %
NEUTROPHILS ABSOLUTE: 10.4 K/UL (ref 1.7–7.7)
NEUTROPHILS ABSOLUTE: 2.8 K/UL (ref 1.7–7.7)
NEUTROPHILS ABSOLUTE: 3 K/UL (ref 1.7–7.7)
NEUTROPHILS ABSOLUTE: 3 K/UL (ref 1.7–7.7)
NEUTROPHILS ABSOLUTE: 3.4 K/UL (ref 1.7–7.7)
NEUTROPHILS ABSOLUTE: 4 K/UL (ref 1.7–7.7)
NEUTROPHILS ABSOLUTE: 4.6 K/UL (ref 1.7–7.7)
NEUTROPHILS ABSOLUTE: 5.9 K/UL (ref 1.7–7.7)
NEUTROPHILS ABSOLUTE: 6.6 K/UL (ref 1.7–7.7)
NEUTROPHILS ABSOLUTE: 7 K/UL (ref 1.7–7.7)
NEUTROPHILS ABSOLUTE: 7.2 K/UL (ref 1.7–7.7)
NEUTROPHILS ABSOLUTE: 7.7 K/UL (ref 1.7–7.7)
NEUTROPHILS ABSOLUTE: 9.2 K/UL (ref 1.7–7.7)
NEUTROPHILS RELATIVE PERCENT: 63.9 %
NEUTROPHILS RELATIVE PERCENT: 67.8 %
NEUTROPHILS RELATIVE PERCENT: 68.9 %
NEUTROPHILS RELATIVE PERCENT: 71.7 %
NEUTROPHILS RELATIVE PERCENT: 73.5 %
NEUTROPHILS RELATIVE PERCENT: 80.4 %
NEUTROPHILS RELATIVE PERCENT: 83 %
NEUTROPHILS RELATIVE PERCENT: 83.1 %
NEUTROPHILS RELATIVE PERCENT: 83.4 %
NEUTROPHILS RELATIVE PERCENT: 84.1 %
NEUTROPHILS RELATIVE PERCENT: 87.5 %
NEUTROPHILS RELATIVE PERCENT: 87.7 %
NEUTROPHILS RELATIVE PERCENT: 90.1 %
NITRITE, URINE: NEGATIVE
ORGANISM: ABNORMAL
PDW BLD-RTO: 15.8 % (ref 12.4–15.4)
PDW BLD-RTO: 17 % (ref 12.4–15.4)
PDW BLD-RTO: 17 % (ref 12.4–15.4)
PDW BLD-RTO: 17.2 % (ref 12.4–15.4)
PDW BLD-RTO: 17.2 % (ref 12.4–15.4)
PDW BLD-RTO: 17.3 % (ref 12.4–15.4)
PDW BLD-RTO: 17.4 % (ref 12.4–15.4)
PDW BLD-RTO: 17.6 % (ref 12.4–15.4)
PDW BLD-RTO: 17.6 % (ref 12.4–15.4)
PDW BLD-RTO: 17.7 % (ref 12.4–15.4)
PDW BLD-RTO: 17.7 % (ref 12.4–15.4)
PERFORMED ON: ABNORMAL
PERFORMED ON: NORMAL
PH UA: 6 (ref 5–8)
PLATELET # BLD: 155 K/UL (ref 135–450)
PLATELET # BLD: 157 K/UL (ref 135–450)
PLATELET # BLD: 162 K/UL (ref 135–450)
PLATELET # BLD: 164 K/UL (ref 135–450)
PLATELET # BLD: 165 K/UL (ref 135–450)
PLATELET # BLD: 168 K/UL (ref 135–450)
PLATELET # BLD: 169 K/UL (ref 135–450)
PLATELET # BLD: 177 K/UL (ref 135–450)
PLATELET # BLD: 190 K/UL (ref 135–450)
PLATELET # BLD: 193 K/UL (ref 135–450)
PLATELET # BLD: 214 K/UL (ref 135–450)
PLATELET # BLD: 239 K/UL (ref 135–450)
PLATELET # BLD: 293 K/UL (ref 135–450)
PMV BLD AUTO: 10.2 FL (ref 5–10.5)
PMV BLD AUTO: 9.3 FL (ref 5–10.5)
PMV BLD AUTO: 9.5 FL (ref 5–10.5)
PMV BLD AUTO: 9.6 FL (ref 5–10.5)
PMV BLD AUTO: 9.7 FL (ref 5–10.5)
PMV BLD AUTO: 9.8 FL (ref 5–10.5)
PMV BLD AUTO: 9.8 FL (ref 5–10.5)
PMV BLD AUTO: 9.9 FL (ref 5–10.5)
POTASSIUM REFLEX MAGNESIUM: 3.2 MMOL/L (ref 3.5–5.1)
POTASSIUM REFLEX MAGNESIUM: 3.3 MMOL/L (ref 3.5–5.1)
POTASSIUM REFLEX MAGNESIUM: 3.7 MMOL/L (ref 3.5–5.1)
POTASSIUM REFLEX MAGNESIUM: 3.8 MMOL/L (ref 3.5–5.1)
POTASSIUM REFLEX MAGNESIUM: 3.9 MMOL/L (ref 3.5–5.1)
POTASSIUM REFLEX MAGNESIUM: 3.9 MMOL/L (ref 3.5–5.1)
POTASSIUM REFLEX MAGNESIUM: 4.2 MMOL/L (ref 3.5–5.1)
POTASSIUM REFLEX MAGNESIUM: 4.3 MMOL/L (ref 3.5–5.1)
POTASSIUM REFLEX MAGNESIUM: 4.5 MMOL/L (ref 3.5–5.1)
POTASSIUM REFLEX MAGNESIUM: 4.6 MMOL/L (ref 3.5–5.1)
POTASSIUM REFLEX MAGNESIUM: 4.7 MMOL/L (ref 3.5–5.1)
POTASSIUM REFLEX MAGNESIUM: 4.9 MMOL/L (ref 3.5–5.1)
POTASSIUM SERPL-SCNC: 4.2 MMOL/L (ref 3.5–5.1)
POTASSIUM SERPL-SCNC: 4.9 MMOL/L (ref 3.5–5.1)
POTASSIUM SERPL-SCNC: 5 MMOL/L (ref 3.5–5.1)
PRO-BNP: 901 PG/ML (ref 0–449)
PROCALCITONIN: 0.13 NG/ML (ref 0–0.15)
PROCALCITONIN: 0.45 NG/ML (ref 0–0.15)
PROTEIN UA: ABNORMAL MG/DL
PROTHROMBIN TIME: 12.1 SEC (ref 10–13.2)
PROTHROMBIN TIME: 12.3 SEC (ref 10–13.2)
PROTHROMBIN TIME: 12.6 SEC (ref 10–13.2)
RBC # BLD: 3.16 M/UL (ref 4–5.2)
RBC # BLD: 3.2 M/UL (ref 4–5.2)
RBC # BLD: 3.23 M/UL (ref 4–5.2)
RBC # BLD: 3.29 M/UL (ref 4–5.2)
RBC # BLD: 3.3 M/UL (ref 4–5.2)
RBC # BLD: 3.31 M/UL (ref 4–5.2)
RBC # BLD: 3.34 M/UL (ref 4–5.2)
RBC # BLD: 3.44 M/UL (ref 4–5.2)
RBC # BLD: 3.47 M/UL (ref 4–5.2)
RBC # BLD: 3.48 M/UL (ref 4–5.2)
RBC # BLD: 3.54 M/UL (ref 4–5.2)
RBC # BLD: 3.59 M/UL (ref 4–5.2)
RBC # BLD: 3.92 M/UL (ref 4–5.2)
RBC UA: 1 /HPF (ref 0–4)
SARS-COV-2, NAAT: DETECTED
SODIUM BLD-SCNC: 130 MMOL/L (ref 136–145)
SODIUM BLD-SCNC: 133 MMOL/L (ref 136–145)
SODIUM BLD-SCNC: 133 MMOL/L (ref 136–145)
SODIUM BLD-SCNC: 135 MMOL/L (ref 136–145)
SODIUM BLD-SCNC: 136 MMOL/L (ref 136–145)
SODIUM BLD-SCNC: 137 MMOL/L (ref 136–145)
SODIUM BLD-SCNC: 137 MMOL/L (ref 136–145)
SODIUM BLD-SCNC: 138 MMOL/L (ref 136–145)
SODIUM BLD-SCNC: 139 MMOL/L (ref 136–145)
SODIUM BLD-SCNC: 141 MMOL/L (ref 136–145)
SPECIFIC GRAVITY UA: 1.02 (ref 1–1.03)
STREP PNEUMONIAE ANTIGEN, URINE: NORMAL
T4 FREE: 0.8 NG/DL (ref 0.9–1.8)
TOTAL IRON BINDING CAPACITY: 283 UG/DL (ref 260–445)
TOTAL PROTEIN: 5.4 G/DL (ref 6.4–8.2)
TOTAL PROTEIN: 5.5 G/DL (ref 6.4–8.2)
TOTAL PROTEIN: 5.6 G/DL (ref 6.4–8.2)
TOTAL PROTEIN: 5.7 G/DL (ref 6.4–8.2)
TOTAL PROTEIN: 5.8 G/DL (ref 6.4–8.2)
TOTAL PROTEIN: 5.8 G/DL (ref 6.4–8.2)
TOTAL PROTEIN: 5.9 G/DL (ref 6.4–8.2)
TOTAL PROTEIN: 6 G/DL (ref 6.4–8.2)
TOTAL PROTEIN: 6.1 G/DL (ref 6.4–8.2)
TOTAL PROTEIN: 6.2 G/DL (ref 6.4–8.2)
TOTAL PROTEIN: 6.2 G/DL (ref 6.4–8.2)
TOTAL PROTEIN: 6.3 G/DL (ref 6.4–8.2)
TOTAL PROTEIN: 6.9 G/DL (ref 6.4–8.2)
TROPONIN: <0.01 NG/ML
TROPONIN: <0.01 NG/ML
TSH SERPL DL<=0.05 MIU/L-ACNC: 5.33 UIU/ML (ref 0.27–4.2)
URINE CULTURE, ROUTINE: NORMAL
URINE TYPE: ABNORMAL
UROBILINOGEN, URINE: 0.2 E.U./DL
WBC # BLD: 11 K/UL (ref 4–11)
WBC # BLD: 11.6 K/UL (ref 4–11)
WBC # BLD: 3.9 K/UL (ref 4–11)
WBC # BLD: 4.3 K/UL (ref 4–11)
WBC # BLD: 4.7 K/UL (ref 4–11)
WBC # BLD: 5 K/UL (ref 4–11)
WBC # BLD: 5.4 K/UL (ref 4–11)
WBC # BLD: 5.5 K/UL (ref 4–11)
WBC # BLD: 7.1 K/UL (ref 4–11)
WBC # BLD: 8.2 K/UL (ref 4–11)
WBC # BLD: 8.3 K/UL (ref 4–11)
WBC # BLD: 8.4 K/UL (ref 4–11)
WBC # BLD: 8.8 K/UL (ref 4–11)
WBC UA: 1 /HPF (ref 0–5)

## 2020-01-01 PROCEDURE — 2700000000 HC OXYGEN THERAPY PER DAY

## 2020-01-01 PROCEDURE — G8399 PT W/DXA RESULTS DOCUMENT: HCPCS | Performed by: FAMILY MEDICINE

## 2020-01-01 PROCEDURE — 6370000000 HC RX 637 (ALT 250 FOR IP): Performed by: INTERNAL MEDICINE

## 2020-01-01 PROCEDURE — 1123F ACP DISCUSS/DSCN MKR DOCD: CPT | Performed by: INTERNAL MEDICINE

## 2020-01-01 PROCEDURE — 36415 COLL VENOUS BLD VENIPUNCTURE: CPT

## 2020-01-01 PROCEDURE — G8399 PT W/DXA RESULTS DOCUMENT: HCPCS | Performed by: INTERNAL MEDICINE

## 2020-01-01 PROCEDURE — 2060000000 HC ICU INTERMEDIATE R&B

## 2020-01-01 PROCEDURE — 6370000000 HC RX 637 (ALT 250 FOR IP): Performed by: STUDENT IN AN ORGANIZED HEALTH CARE EDUCATION/TRAINING PROGRAM

## 2020-01-01 PROCEDURE — G8510 SCR DEP NEG, NO PLAN REQD: HCPCS | Performed by: FAMILY MEDICINE

## 2020-01-01 PROCEDURE — 94761 N-INVAS EAR/PLS OXIMETRY MLT: CPT

## 2020-01-01 PROCEDURE — 94640 AIRWAY INHALATION TREATMENT: CPT

## 2020-01-01 PROCEDURE — 2500000003 HC RX 250 WO HCPCS: Performed by: INTERNAL MEDICINE

## 2020-01-01 PROCEDURE — 85025 COMPLETE CBC W/AUTO DIFF WBC: CPT

## 2020-01-01 PROCEDURE — 6360000002 HC RX W HCPCS: Performed by: STUDENT IN AN ORGANIZED HEALTH CARE EDUCATION/TRAINING PROGRAM

## 2020-01-01 PROCEDURE — 30903 CONTROL OF NOSEBLEED: CPT

## 2020-01-01 PROCEDURE — 1123F ACP DISCUSS/DSCN MKR DOCD: CPT | Performed by: FAMILY MEDICINE

## 2020-01-01 PROCEDURE — 87449 NOS EACH ORGANISM AG IA: CPT

## 2020-01-01 PROCEDURE — 6370000000 HC RX 637 (ALT 250 FOR IP): Performed by: NURSE PRACTITIONER

## 2020-01-01 PROCEDURE — 80053 COMPREHEN METABOLIC PANEL: CPT

## 2020-01-01 PROCEDURE — 83735 ASSAY OF MAGNESIUM: CPT

## 2020-01-01 PROCEDURE — 2580000003 HC RX 258: Performed by: INTERNAL MEDICINE

## 2020-01-01 PROCEDURE — 85379 FIBRIN DEGRADATION QUANT: CPT

## 2020-01-01 PROCEDURE — 93010 ELECTROCARDIOGRAM REPORT: CPT | Performed by: INTERNAL MEDICINE

## 2020-01-01 PROCEDURE — G8482 FLU IMMUNIZE ORDER/ADMIN: HCPCS | Performed by: FAMILY MEDICINE

## 2020-01-01 PROCEDURE — 71045 X-RAY EXAM CHEST 1 VIEW: CPT

## 2020-01-01 PROCEDURE — 1090F PRES/ABSN URINE INCON ASSESS: CPT | Performed by: FAMILY MEDICINE

## 2020-01-01 PROCEDURE — 87070 CULTURE OTHR SPECIMN AEROBIC: CPT

## 2020-01-01 PROCEDURE — 99214 OFFICE O/P EST MOD 30 MIN: CPT | Performed by: FAMILY MEDICINE

## 2020-01-01 PROCEDURE — 87205 SMEAR GRAM STAIN: CPT

## 2020-01-01 PROCEDURE — 2580000003 HC RX 258: Performed by: STUDENT IN AN ORGANIZED HEALTH CARE EDUCATION/TRAINING PROGRAM

## 2020-01-01 PROCEDURE — 99285 EMERGENCY DEPT VISIT HI MDM: CPT

## 2020-01-01 PROCEDURE — 51702 INSERT TEMP BLADDER CATH: CPT

## 2020-01-01 PROCEDURE — 99232 SBSQ HOSP IP/OBS MODERATE 35: CPT | Performed by: INTERNAL MEDICINE

## 2020-01-01 PROCEDURE — 96365 THER/PROPH/DIAG IV INF INIT: CPT

## 2020-01-01 PROCEDURE — 2580000003 HC RX 258: Performed by: NURSE PRACTITIONER

## 2020-01-01 PROCEDURE — 1090F PRES/ABSN URINE INCON ASSESS: CPT | Performed by: INTERNAL MEDICINE

## 2020-01-01 PROCEDURE — 99213 OFFICE O/P EST LOW 20 MIN: CPT | Performed by: FAMILY MEDICINE

## 2020-01-01 PROCEDURE — 82728 ASSAY OF FERRITIN: CPT

## 2020-01-01 PROCEDURE — 4040F PNEUMOC VAC/ADMIN/RCVD: CPT | Performed by: INTERNAL MEDICINE

## 2020-01-01 PROCEDURE — 85384 FIBRINOGEN ACTIVITY: CPT

## 2020-01-01 PROCEDURE — 36430 TRANSFUSION BLD/BLD COMPNT: CPT

## 2020-01-01 PROCEDURE — 84484 ASSAY OF TROPONIN QUANT: CPT

## 2020-01-01 PROCEDURE — 6360000002 HC RX W HCPCS: Performed by: NURSE PRACTITIONER

## 2020-01-01 PROCEDURE — 71260 CT THORAX DX C+: CPT

## 2020-01-01 PROCEDURE — 99213 OFFICE O/P EST LOW 20 MIN: CPT | Performed by: INTERNAL MEDICINE

## 2020-01-01 PROCEDURE — G8427 DOCREV CUR MEDS BY ELIG CLIN: HCPCS | Performed by: FAMILY MEDICINE

## 2020-01-01 PROCEDURE — 96375 TX/PRO/DX INJ NEW DRUG ADDON: CPT

## 2020-01-01 PROCEDURE — XW13325 TRANSFUSION OF CONVALESCENT PLASMA (NONAUTOLOGOUS) INTO PERIPHERAL VEIN, PERCUTANEOUS APPROACH, NEW TECHNOLOGY GROUP 5: ICD-10-PCS | Performed by: INTERNAL MEDICINE

## 2020-01-01 PROCEDURE — 83605 ASSAY OF LACTIC ACID: CPT

## 2020-01-01 PROCEDURE — 6360000002 HC RX W HCPCS: Performed by: INTERNAL MEDICINE

## 2020-01-01 PROCEDURE — 4040F PNEUMOC VAC/ADMIN/RCVD: CPT | Performed by: FAMILY MEDICINE

## 2020-01-01 PROCEDURE — 99233 SBSQ HOSP IP/OBS HIGH 50: CPT | Performed by: INTERNAL MEDICINE

## 2020-01-01 PROCEDURE — 1036F TOBACCO NON-USER: CPT | Performed by: INTERNAL MEDICINE

## 2020-01-01 PROCEDURE — 83550 IRON BINDING TEST: CPT

## 2020-01-01 PROCEDURE — 99223 1ST HOSP IP/OBS HIGH 75: CPT | Performed by: INTERNAL MEDICINE

## 2020-01-01 PROCEDURE — 90694 VACC AIIV4 NO PRSRV 0.5ML IM: CPT | Performed by: FAMILY MEDICINE

## 2020-01-01 PROCEDURE — 87186 SC STD MICRODIL/AGAR DIL: CPT

## 2020-01-01 PROCEDURE — G8427 DOCREV CUR MEDS BY ELIG CLIN: HCPCS | Performed by: INTERNAL MEDICINE

## 2020-01-01 PROCEDURE — 99232 SBSQ HOSP IP/OBS MODERATE 35: CPT | Performed by: NURSE PRACTITIONER

## 2020-01-01 PROCEDURE — 6360000004 HC RX CONTRAST MEDICATION: Performed by: EMERGENCY MEDICINE

## 2020-01-01 PROCEDURE — G0008 ADMIN INFLUENZA VIRUS VAC: HCPCS | Performed by: FAMILY MEDICINE

## 2020-01-01 PROCEDURE — 1200000000 HC SEMI PRIVATE

## 2020-01-01 PROCEDURE — 83615 LACTATE (LD) (LDH) ENZYME: CPT

## 2020-01-01 PROCEDURE — 84145 PROCALCITONIN (PCT): CPT

## 2020-01-01 PROCEDURE — 2500000003 HC RX 250 WO HCPCS: Performed by: EMERGENCY MEDICINE

## 2020-01-01 PROCEDURE — 1036F TOBACCO NON-USER: CPT | Performed by: FAMILY MEDICINE

## 2020-01-01 PROCEDURE — XW033E5 INTRODUCTION OF REMDESIVIR ANTI-INFECTIVE INTO PERIPHERAL VEIN, PERCUTANEOUS APPROACH, NEW TECHNOLOGY GROUP 5: ICD-10-PCS | Performed by: INTERNAL MEDICINE

## 2020-01-01 PROCEDURE — 85730 THROMBOPLASTIN TIME PARTIAL: CPT

## 2020-01-01 PROCEDURE — 93005 ELECTROCARDIOGRAM TRACING: CPT | Performed by: NURSE PRACTITIONER

## 2020-01-01 PROCEDURE — 99282 EMERGENCY DEPT VISIT SF MDM: CPT

## 2020-01-01 PROCEDURE — 85610 PROTHROMBIN TIME: CPT

## 2020-01-01 PROCEDURE — 93306 TTE W/DOPPLER COMPLETE: CPT

## 2020-01-01 PROCEDURE — 86901 BLOOD TYPING SEROLOGIC RH(D): CPT

## 2020-01-01 PROCEDURE — 93970 EXTREMITY STUDY: CPT

## 2020-01-01 PROCEDURE — 86900 BLOOD TYPING SEROLOGIC ABO: CPT

## 2020-01-01 PROCEDURE — G8417 CALC BMI ABV UP PARAM F/U: HCPCS | Performed by: FAMILY MEDICINE

## 2020-01-01 PROCEDURE — 87040 BLOOD CULTURE FOR BACTERIA: CPT

## 2020-01-01 PROCEDURE — G8417 CALC BMI ABV UP PARAM F/U: HCPCS | Performed by: INTERNAL MEDICINE

## 2020-01-01 PROCEDURE — 93005 ELECTROCARDIOGRAM TRACING: CPT | Performed by: INTERNAL MEDICINE

## 2020-01-01 PROCEDURE — 87077 CULTURE AEROBIC IDENTIFY: CPT

## 2020-01-01 PROCEDURE — U0002 COVID-19 LAB TEST NON-CDC: HCPCS

## 2020-01-01 PROCEDURE — G8482 FLU IMMUNIZE ORDER/ADMIN: HCPCS | Performed by: INTERNAL MEDICINE

## 2020-01-01 PROCEDURE — 83540 ASSAY OF IRON: CPT

## 2020-01-01 PROCEDURE — 83880 ASSAY OF NATRIURETIC PEPTIDE: CPT

## 2020-01-01 PROCEDURE — 99222 1ST HOSP IP/OBS MODERATE 55: CPT | Performed by: INTERNAL MEDICINE

## 2020-01-01 PROCEDURE — 86850 RBC ANTIBODY SCREEN: CPT

## 2020-01-01 PROCEDURE — 6370000000 HC RX 637 (ALT 250 FOR IP): Performed by: EMERGENCY MEDICINE

## 2020-01-01 RX ORDER — MORPHINE SULFATE 2 MG/ML
2 INJECTION, SOLUTION INTRAMUSCULAR; INTRAVENOUS ONCE
Status: COMPLETED | OUTPATIENT
Start: 2020-01-01 | End: 2020-01-01

## 2020-01-01 RX ORDER — FUROSEMIDE 10 MG/ML
40 INJECTION INTRAMUSCULAR; INTRAVENOUS ONCE
Status: DISCONTINUED | OUTPATIENT
Start: 2020-01-01 | End: 2020-01-01

## 2020-01-01 RX ORDER — ACETAMINOPHEN 325 MG/1
650 TABLET ORAL EVERY 6 HOURS PRN
Status: DISCONTINUED | OUTPATIENT
Start: 2020-01-01 | End: 2020-11-01 | Stop reason: HOSPADM

## 2020-01-01 RX ORDER — CARBAMAZEPINE 200 MG/1
400 TABLET, EXTENDED RELEASE ORAL 2 TIMES DAILY
Status: DISCONTINUED | OUTPATIENT
Start: 2020-01-01 | End: 2020-01-01

## 2020-01-01 RX ORDER — PREGABALIN 50 MG/1
50 CAPSULE ORAL DAILY
Status: DISCONTINUED | OUTPATIENT
Start: 2020-01-01 | End: 2020-01-01

## 2020-01-01 RX ORDER — LEVOTHYROXINE SODIUM 0.1 MG/1
TABLET ORAL
Qty: 90 TABLET | Refills: 1 | Status: SHIPPED | OUTPATIENT
Start: 2020-01-01 | End: 2020-01-01

## 2020-01-01 RX ORDER — OXYCODONE AND ACETAMINOPHEN 7.5; 325 MG/1; MG/1
1 TABLET ORAL EVERY 6 HOURS PRN
Qty: 84 TABLET | Refills: 0 | Status: SHIPPED | OUTPATIENT
Start: 2020-01-01 | End: 2020-01-01 | Stop reason: SDUPTHER

## 2020-01-01 RX ORDER — DEXTROSE MONOHYDRATE 25 G/50ML
12.5 INJECTION, SOLUTION INTRAVENOUS PRN
Status: DISCONTINUED | OUTPATIENT
Start: 2020-01-01 | End: 2020-11-01 | Stop reason: HOSPADM

## 2020-01-01 RX ORDER — OXYCODONE AND ACETAMINOPHEN 7.5; 325 MG/1; MG/1
1 TABLET ORAL EVERY 6 HOURS PRN
Qty: 90 TABLET | Refills: 0 | Status: SHIPPED | OUTPATIENT
Start: 2020-01-01 | End: 2020-01-01 | Stop reason: SDUPTHER

## 2020-01-01 RX ORDER — AZITHROMYCIN 500 MG/1
500 TABLET, FILM COATED ORAL
COMMUNITY

## 2020-01-01 RX ORDER — PREGABALIN 50 MG/1
50 CAPSULE ORAL NIGHTLY
Status: DISCONTINUED | OUTPATIENT
Start: 2020-01-01 | End: 2020-11-01 | Stop reason: HOSPADM

## 2020-01-01 RX ORDER — CLOPIDOGREL BISULFATE 75 MG/1
75 TABLET ORAL DAILY
Status: DISCONTINUED | OUTPATIENT
Start: 2020-01-01 | End: 2020-01-01

## 2020-01-01 RX ORDER — AMLODIPINE BESYLATE 5 MG/1
2.5 TABLET ORAL DAILY
Status: DISCONTINUED | OUTPATIENT
Start: 2020-01-01 | End: 2020-11-01 | Stop reason: HOSPADM

## 2020-01-01 RX ORDER — METHYLPREDNISOLONE SODIUM SUCCINATE 125 MG/2ML
125 INJECTION, POWDER, LYOPHILIZED, FOR SOLUTION INTRAMUSCULAR; INTRAVENOUS ONCE
Status: COMPLETED | OUTPATIENT
Start: 2020-01-01 | End: 2020-01-01

## 2020-01-01 RX ORDER — GUAIFENESIN/DEXTROMETHORPHAN 100-10MG/5
5 SYRUP ORAL EVERY 4 HOURS PRN
Status: DISCONTINUED | OUTPATIENT
Start: 2020-01-01 | End: 2020-11-01 | Stop reason: HOSPADM

## 2020-01-01 RX ORDER — FUROSEMIDE 40 MG/1
40 TABLET ORAL DAILY
COMMUNITY

## 2020-01-01 RX ORDER — ACETAMINOPHEN 325 MG/1
650 TABLET ORAL EVERY 8 HOURS SCHEDULED
Status: DISCONTINUED | OUTPATIENT
Start: 2020-01-01 | End: 2020-11-01 | Stop reason: HOSPADM

## 2020-01-01 RX ORDER — ALBUTEROL SULFATE 90 UG/1
2 AEROSOL, METERED RESPIRATORY (INHALATION) EVERY 6 HOURS PRN
Status: DISCONTINUED | OUTPATIENT
Start: 2020-01-01 | End: 2020-11-01 | Stop reason: HOSPADM

## 2020-01-01 RX ORDER — NITROGLYCERIN 0.4 MG/1
0.4 TABLET SUBLINGUAL EVERY 5 MIN PRN
Status: DISCONTINUED | OUTPATIENT
Start: 2020-01-01 | End: 2020-11-01 | Stop reason: HOSPADM

## 2020-01-01 RX ORDER — ACETAMINOPHEN 325 MG/1
650 TABLET ORAL EVERY 6 HOURS PRN
Status: DISCONTINUED | OUTPATIENT
Start: 2020-01-01 | End: 2020-01-01 | Stop reason: SDUPTHER

## 2020-01-01 RX ORDER — ISOSORBIDE MONONITRATE 60 MG/1
60 TABLET, EXTENDED RELEASE ORAL DAILY
Qty: 30 TABLET | Refills: 1 | Status: SHIPPED
Start: 2020-01-01

## 2020-01-01 RX ORDER — RANOLAZINE 500 MG/1
500 TABLET, EXTENDED RELEASE ORAL 2 TIMES DAILY
Status: DISCONTINUED | OUTPATIENT
Start: 2020-01-01 | End: 2020-01-01

## 2020-01-01 RX ORDER — BENZONATATE 100 MG/1
100 CAPSULE ORAL 3 TIMES DAILY PRN
COMMUNITY

## 2020-01-01 RX ORDER — 0.9 % SODIUM CHLORIDE 0.9 %
20 INTRAVENOUS SOLUTION INTRAVENOUS ONCE
Status: COMPLETED | OUTPATIENT
Start: 2020-01-01 | End: 2020-01-01

## 2020-01-01 RX ORDER — ONDANSETRON 2 MG/ML
4 INJECTION INTRAMUSCULAR; INTRAVENOUS EVERY 6 HOURS PRN
Status: DISCONTINUED | OUTPATIENT
Start: 2020-01-01 | End: 2020-11-01 | Stop reason: HOSPADM

## 2020-01-01 RX ORDER — DEXAMETHASONE 6 MG/1
6 TABLET ORAL DAILY
Status: DISCONTINUED | OUTPATIENT
Start: 2020-01-01 | End: 2020-01-01

## 2020-01-01 RX ORDER — ACETAMINOPHEN 650 MG/1
650 SUPPOSITORY RECTAL EVERY 6 HOURS PRN
Status: DISCONTINUED | OUTPATIENT
Start: 2020-01-01 | End: 2020-01-01 | Stop reason: SDUPTHER

## 2020-01-01 RX ORDER — ALPRAZOLAM 1 MG/1
TABLET ORAL
Qty: 60 TABLET | Refills: 1 | Status: SHIPPED | OUTPATIENT
Start: 2020-01-01 | End: 2020-01-01

## 2020-01-01 RX ORDER — LACTULOSE 10 G/15ML
30 SOLUTION ORAL ONCE
Status: COMPLETED | OUTPATIENT
Start: 2020-01-01 | End: 2020-01-01

## 2020-01-01 RX ORDER — CARBAMAZEPINE 200 MG/1
200 TABLET, EXTENDED RELEASE ORAL 2 TIMES DAILY
COMMUNITY
End: 2020-01-01 | Stop reason: DRUGHIGH

## 2020-01-01 RX ORDER — ISOSORBIDE MONONITRATE 60 MG/1
60 TABLET, EXTENDED RELEASE ORAL DAILY
Status: DISCONTINUED | OUTPATIENT
Start: 2020-01-01 | End: 2020-11-01 | Stop reason: HOSPADM

## 2020-01-01 RX ORDER — POLYETHYLENE GLYCOL 3350 17 G/17G
17 POWDER, FOR SOLUTION ORAL DAILY
Status: DISCONTINUED | OUTPATIENT
Start: 2020-01-01 | End: 2020-11-01 | Stop reason: HOSPADM

## 2020-01-01 RX ORDER — PREGABALIN 50 MG/1
50 CAPSULE ORAL DAILY
Qty: 90 CAPSULE | Refills: 0 | Status: SHIPPED | OUTPATIENT
Start: 2020-01-01 | End: 2020-01-01 | Stop reason: SDUPTHER

## 2020-01-01 RX ORDER — SODIUM CHLORIDE 0.9 % (FLUSH) 0.9 %
10 SYRINGE (ML) INJECTION PRN
Status: DISCONTINUED | OUTPATIENT
Start: 2020-01-01 | End: 2020-11-01 | Stop reason: HOSPADM

## 2020-01-01 RX ORDER — ALPRAZOLAM 1 MG/1
TABLET ORAL
Qty: 60 TABLET | Refills: 0 | Status: SHIPPED | OUTPATIENT
Start: 2020-01-01 | End: 2020-01-01 | Stop reason: SDUPTHER

## 2020-01-01 RX ORDER — DEXTROSE MONOHYDRATE 50 MG/ML
100 INJECTION, SOLUTION INTRAVENOUS PRN
Status: DISCONTINUED | OUTPATIENT
Start: 2020-01-01 | End: 2020-11-01 | Stop reason: HOSPADM

## 2020-01-01 RX ORDER — PANTOPRAZOLE SODIUM 20 MG/1
20 TABLET, DELAYED RELEASE ORAL DAILY
Status: DISCONTINUED | OUTPATIENT
Start: 2020-01-01 | End: 2020-11-01 | Stop reason: HOSPADM

## 2020-01-01 RX ORDER — NICOTINE POLACRILEX 4 MG
15 LOZENGE BUCCAL PRN
Status: DISCONTINUED | OUTPATIENT
Start: 2020-01-01 | End: 2020-11-01 | Stop reason: HOSPADM

## 2020-01-01 RX ORDER — LORAZEPAM 2 MG/ML
1 INJECTION INTRAMUSCULAR
Status: DISCONTINUED | OUTPATIENT
Start: 2020-01-01 | End: 2020-11-01 | Stop reason: HOSPADM

## 2020-01-01 RX ORDER — BUDESONIDE AND FORMOTEROL FUMARATE DIHYDRATE 160; 4.5 UG/1; UG/1
2 AEROSOL RESPIRATORY (INHALATION) 2 TIMES DAILY
Status: DISCONTINUED | OUTPATIENT
Start: 2020-01-01 | End: 2020-11-01 | Stop reason: HOSPADM

## 2020-01-01 RX ORDER — OXYCODONE AND ACETAMINOPHEN 7.5; 325 MG/1; MG/1
1 TABLET ORAL EVERY 6 HOURS PRN
Status: DISCONTINUED | OUTPATIENT
Start: 2020-01-01 | End: 2020-01-01

## 2020-01-01 RX ORDER — RANOLAZINE 500 MG/1
1000 TABLET, EXTENDED RELEASE ORAL 2 TIMES DAILY
Status: DISCONTINUED | OUTPATIENT
Start: 2020-01-01 | End: 2020-11-01 | Stop reason: HOSPADM

## 2020-01-01 RX ORDER — POTASSIUM CHLORIDE 7.45 MG/ML
10 INJECTION INTRAVENOUS
Status: COMPLETED | OUTPATIENT
Start: 2020-01-01 | End: 2020-01-01

## 2020-01-01 RX ORDER — IPRATROPIUM BROMIDE AND ALBUTEROL SULFATE 2.5; .5 MG/3ML; MG/3ML
3 SOLUTION RESPIRATORY (INHALATION) ONCE
Status: COMPLETED | OUTPATIENT
Start: 2020-01-01 | End: 2020-01-01

## 2020-01-01 RX ORDER — FUROSEMIDE 10 MG/ML
20 INJECTION INTRAMUSCULAR; INTRAVENOUS ONCE
Status: COMPLETED | OUTPATIENT
Start: 2020-01-01 | End: 2020-01-01

## 2020-01-01 RX ORDER — 0.9 % SODIUM CHLORIDE 0.9 %
30 INTRAVENOUS SOLUTION INTRAVENOUS PRN
Status: DISCONTINUED | OUTPATIENT
Start: 2020-01-01 | End: 2020-11-01 | Stop reason: HOSPADM

## 2020-01-01 RX ORDER — MECLIZINE HYDROCHLORIDE 25 MG/1
25 TABLET ORAL 3 TIMES DAILY PRN
COMMUNITY

## 2020-01-01 RX ORDER — ERGOCALCIFEROL 1.25 MG/1
50000 CAPSULE ORAL WEEKLY
Qty: 12 CAPSULE | Refills: 1 | Status: SHIPPED | OUTPATIENT
Start: 2020-01-01

## 2020-01-01 RX ORDER — LIDOCAINE HYDROCHLORIDE AND EPINEPHRINE BITARTRATE 20; .01 MG/ML; MG/ML
20 INJECTION, SOLUTION SUBCUTANEOUS ONCE
Status: COMPLETED | OUTPATIENT
Start: 2020-01-01 | End: 2020-01-01

## 2020-01-01 RX ORDER — ALLOPURINOL 300 MG/1
300 TABLET ORAL DAILY
Qty: 90 TABLET | Refills: 1 | Status: SHIPPED | OUTPATIENT
Start: 2020-01-01 | End: 2020-01-01 | Stop reason: SDUPTHER

## 2020-01-01 RX ORDER — OXYCODONE AND ACETAMINOPHEN 7.5; 325 MG/1; MG/1
1 TABLET ORAL EVERY 6 HOURS PRN
Qty: 84 TABLET | Refills: 0 | Status: SHIPPED | OUTPATIENT
Start: 2020-01-01 | End: 2020-12-01

## 2020-01-01 RX ORDER — MONTELUKAST SODIUM 10 MG/1
10 TABLET ORAL NIGHTLY
Status: DISCONTINUED | OUTPATIENT
Start: 2020-01-01 | End: 2020-11-01 | Stop reason: HOSPADM

## 2020-01-01 RX ORDER — MORPHINE SULFATE 2 MG/ML
2 INJECTION, SOLUTION INTRAMUSCULAR; INTRAVENOUS
Status: DISCONTINUED | OUTPATIENT
Start: 2020-01-01 | End: 2020-11-01 | Stop reason: HOSPADM

## 2020-01-01 RX ORDER — LEVOTHYROXINE SODIUM 0.1 MG/1
100 TABLET ORAL DAILY
Status: DISCONTINUED | OUTPATIENT
Start: 2020-01-01 | End: 2020-11-01 | Stop reason: HOSPADM

## 2020-01-01 RX ORDER — ALBUTEROL SULFATE 90 UG/1
2 AEROSOL, METERED RESPIRATORY (INHALATION) 4 TIMES DAILY
Status: DISCONTINUED | OUTPATIENT
Start: 2020-01-01 | End: 2020-01-01

## 2020-01-01 RX ORDER — ALPRAZOLAM 1 MG/1
1 TABLET ORAL 2 TIMES DAILY PRN
Qty: 60 TABLET | Refills: 1 | Status: SHIPPED | OUTPATIENT
Start: 2020-01-01 | End: 2020-01-01

## 2020-01-01 RX ORDER — ACETAMINOPHEN 650 MG/1
650 SUPPOSITORY RECTAL EVERY 6 HOURS PRN
Status: DISCONTINUED | OUTPATIENT
Start: 2020-01-01 | End: 2020-11-01 | Stop reason: HOSPADM

## 2020-01-01 RX ORDER — ALPRAZOLAM 0.5 MG/1
1 TABLET ORAL 2 TIMES DAILY PRN
Status: DISCONTINUED | OUTPATIENT
Start: 2020-01-01 | End: 2020-01-01

## 2020-01-01 RX ORDER — CARBAMAZEPINE 200 MG/1
400 TABLET, EXTENDED RELEASE ORAL 2 TIMES DAILY
Qty: 60 TABLET | Refills: 0 | Status: SHIPPED
Start: 2020-01-01

## 2020-01-01 RX ORDER — METOPROLOL TARTRATE 50 MG/1
25 TABLET, FILM COATED ORAL 2 TIMES DAILY
Qty: 30 TABLET | Refills: 1 | Status: SHIPPED
Start: 2020-01-01

## 2020-01-01 RX ORDER — LIDOCAINE HYDROCHLORIDE AND EPINEPHRINE 10; 10 MG/ML; UG/ML
20 INJECTION, SOLUTION INFILTRATION; PERINEURAL ONCE
Status: DISCONTINUED | OUTPATIENT
Start: 2020-01-01 | End: 2020-01-01

## 2020-01-01 RX ORDER — ATORVASTATIN CALCIUM 80 MG/1
80 TABLET, FILM COATED ORAL DAILY
Status: DISCONTINUED | OUTPATIENT
Start: 2020-01-01 | End: 2020-11-01 | Stop reason: HOSPADM

## 2020-01-01 RX ORDER — FUROSEMIDE 10 MG/ML
20 INJECTION INTRAMUSCULAR; INTRAVENOUS 2 TIMES DAILY
Status: DISCONTINUED | OUTPATIENT
Start: 2020-01-01 | End: 2020-01-01

## 2020-01-01 RX ORDER — LISINOPRIL 5 MG/1
5 TABLET ORAL DAILY
Status: DISCONTINUED | OUTPATIENT
Start: 2020-01-01 | End: 2020-11-01 | Stop reason: HOSPADM

## 2020-01-01 RX ORDER — CEPHALEXIN 500 MG/1
500 CAPSULE ORAL ONCE
Status: DISCONTINUED | OUTPATIENT
Start: 2020-01-01 | End: 2020-01-01

## 2020-01-01 RX ORDER — POTASSIUM CHLORIDE 20 MEQ/1
20 TABLET, EXTENDED RELEASE ORAL ONCE
Status: COMPLETED | OUTPATIENT
Start: 2020-01-01 | End: 2020-01-01

## 2020-01-01 RX ORDER — CEPHALEXIN 500 MG/1
500 CAPSULE ORAL 3 TIMES DAILY
Qty: 15 CAPSULE | Refills: 0 | Status: SHIPPED | OUTPATIENT
Start: 2020-01-01 | End: 2020-01-01 | Stop reason: SDUPTHER

## 2020-01-01 RX ORDER — SODIUM CHLORIDE 0.9 % (FLUSH) 0.9 %
10 SYRINGE (ML) INJECTION EVERY 12 HOURS SCHEDULED
Status: DISCONTINUED | OUTPATIENT
Start: 2020-01-01 | End: 2020-11-01 | Stop reason: HOSPADM

## 2020-01-01 RX ORDER — PREGABALIN 50 MG/1
50 CAPSULE ORAL DAILY
Qty: 90 CAPSULE | Refills: 0 | Status: SHIPPED | OUTPATIENT
Start: 2020-01-01 | End: 2020-11-05

## 2020-01-01 RX ORDER — FUROSEMIDE 10 MG/ML
80 INJECTION INTRAMUSCULAR; INTRAVENOUS ONCE
Status: COMPLETED | OUTPATIENT
Start: 2020-01-01 | End: 2020-01-01

## 2020-01-01 RX ORDER — IPRATROPIUM BROMIDE AND ALBUTEROL SULFATE 2.5; .5 MG/3ML; MG/3ML
1 SOLUTION RESPIRATORY (INHALATION) 2 TIMES DAILY
COMMUNITY

## 2020-01-01 RX ORDER — ASPIRIN 81 MG/1
81 TABLET ORAL DAILY
Status: DISCONTINUED | OUTPATIENT
Start: 2020-01-01 | End: 2020-11-01 | Stop reason: HOSPADM

## 2020-01-01 RX ORDER — POTASSIUM CHLORIDE 20 MEQ/1
30 TABLET, EXTENDED RELEASE ORAL DAILY
Qty: 135 TABLET | Refills: 1 | Status: SHIPPED | OUTPATIENT
Start: 2020-01-01

## 2020-01-01 RX ORDER — ALLOPURINOL 300 MG/1
300 TABLET ORAL DAILY
Qty: 90 TABLET | Refills: 1 | Status: SHIPPED | OUTPATIENT
Start: 2020-01-01

## 2020-01-01 RX ADMIN — METOPROLOL TARTRATE 25 MG: 25 TABLET, FILM COATED ORAL at 08:48

## 2020-01-01 RX ADMIN — MONTELUKAST 10 MG: 10 TABLET, FILM COATED ORAL at 20:42

## 2020-01-01 RX ADMIN — ISOSORBIDE MONONITRATE 60 MG: 60 TABLET, EXTENDED RELEASE ORAL at 09:30

## 2020-01-01 RX ADMIN — ATORVASTATIN CALCIUM 80 MG: 80 TABLET, FILM COATED ORAL at 09:02

## 2020-01-01 RX ADMIN — NITROGLYCERIN 0.4 MG: 0.4 TABLET, ORALLY DISINTEGRATING SUBLINGUAL at 00:55

## 2020-01-01 RX ADMIN — ISOSORBIDE MONONITRATE 60 MG: 60 TABLET, EXTENDED RELEASE ORAL at 09:18

## 2020-01-01 RX ADMIN — PSYLLIUM HUSK 1 PACKET: 3.4 GRANULE ORAL at 09:39

## 2020-01-01 RX ADMIN — ALPRAZOLAM 1 MG: 0.5 TABLET ORAL at 23:13

## 2020-01-01 RX ADMIN — SODIUM CHLORIDE, PRESERVATIVE FREE 10 ML: 5 INJECTION INTRAVENOUS at 09:17

## 2020-01-01 RX ADMIN — MONTELUKAST 10 MG: 10 TABLET, FILM COATED ORAL at 20:56

## 2020-01-01 RX ADMIN — LACTULOSE 30 G: 20 SOLUTION ORAL at 18:33

## 2020-01-01 RX ADMIN — SODIUM CHLORIDE, PRESERVATIVE FREE 10 ML: 5 INJECTION INTRAVENOUS at 09:31

## 2020-01-01 RX ADMIN — Medication 10 MEQ: at 13:24

## 2020-01-01 RX ADMIN — ACETAMINOPHEN 650 MG: 325 TABLET ORAL at 16:36

## 2020-01-01 RX ADMIN — LEVOTHYROXINE SODIUM 100 MCG: 0.1 TABLET ORAL at 05:33

## 2020-01-01 RX ADMIN — IPRATROPIUM BROMIDE AND ALBUTEROL 1 PUFF: 20; 100 SPRAY, METERED RESPIRATORY (INHALATION) at 20:14

## 2020-01-01 RX ADMIN — RANOLAZINE 1000 MG: 500 TABLET, FILM COATED, EXTENDED RELEASE ORAL at 08:52

## 2020-01-01 RX ADMIN — CEFTRIAXONE 1 G: 1 INJECTION, POWDER, FOR SOLUTION INTRAMUSCULAR; INTRAVENOUS at 00:38

## 2020-01-01 RX ADMIN — INSULIN LISPRO 6 UNITS: 100 INJECTION, SOLUTION INTRAVENOUS; SUBCUTANEOUS at 17:58

## 2020-01-01 RX ADMIN — IPRATROPIUM BROMIDE AND ALBUTEROL 1 PUFF: 20; 100 SPRAY, METERED RESPIRATORY (INHALATION) at 08:08

## 2020-01-01 RX ADMIN — ISOSORBIDE MONONITRATE 60 MG: 60 TABLET, EXTENDED RELEASE ORAL at 08:49

## 2020-01-01 RX ADMIN — RANOLAZINE 1000 MG: 500 TABLET, FILM COATED, EXTENDED RELEASE ORAL at 20:56

## 2020-01-01 RX ADMIN — LISINOPRIL 5 MG: 5 TABLET ORAL at 09:19

## 2020-01-01 RX ADMIN — METOPROLOL TARTRATE 25 MG: 25 TABLET, FILM COATED ORAL at 22:01

## 2020-01-01 RX ADMIN — BUDESONIDE AND FORMOTEROL FUMARATE DIHYDRATE 2 PUFF: 160; 4.5 AEROSOL RESPIRATORY (INHALATION) at 08:56

## 2020-01-01 RX ADMIN — CARBAMAZEPINE 300 MG: 200 TABLET, EXTENDED RELEASE ORAL at 21:12

## 2020-01-01 RX ADMIN — OXYCODONE HYDROCHLORIDE AND ACETAMINOPHEN 1 TABLET: 7.5; 325 TABLET ORAL at 16:15

## 2020-01-01 RX ADMIN — BUDESONIDE AND FORMOTEROL FUMARATE DIHYDRATE 2 PUFF: 160; 4.5 AEROSOL RESPIRATORY (INHALATION) at 20:10

## 2020-01-01 RX ADMIN — POLYETHYLENE GLYCOL 3350 17 G: 17 POWDER, FOR SOLUTION ORAL at 08:40

## 2020-01-01 RX ADMIN — BUDESONIDE AND FORMOTEROL FUMARATE DIHYDRATE 2 PUFF: 160; 4.5 AEROSOL RESPIRATORY (INHALATION) at 08:34

## 2020-01-01 RX ADMIN — POLYETHYLENE GLYCOL 3350 17 G: 17 POWDER, FOR SOLUTION ORAL at 09:21

## 2020-01-01 RX ADMIN — FUROSEMIDE 80 MG: 10 INJECTION, SOLUTION INTRAMUSCULAR; INTRAVENOUS at 23:59

## 2020-01-01 RX ADMIN — INSULIN LISPRO 6 UNITS: 100 INJECTION, SOLUTION INTRAVENOUS; SUBCUTANEOUS at 12:00

## 2020-01-01 RX ADMIN — ATORVASTATIN CALCIUM 80 MG: 80 TABLET, FILM COATED ORAL at 08:53

## 2020-01-01 RX ADMIN — CARBAMAZEPINE 400 MG: 200 TABLET, EXTENDED RELEASE ORAL at 20:55

## 2020-01-01 RX ADMIN — IOPAMIDOL 75 ML: 755 INJECTION, SOLUTION INTRAVENOUS at 01:34

## 2020-01-01 RX ADMIN — INSULIN LISPRO 2 UNITS: 100 INJECTION, SOLUTION INTRAVENOUS; SUBCUTANEOUS at 17:30

## 2020-01-01 RX ADMIN — BUDESONIDE AND FORMOTEROL FUMARATE DIHYDRATE 2 PUFF: 160; 4.5 AEROSOL RESPIRATORY (INHALATION) at 07:52

## 2020-01-01 RX ADMIN — ACETAMINOPHEN 650 MG: 325 TABLET ORAL at 01:23

## 2020-01-01 RX ADMIN — AMLODIPINE BESYLATE 2.5 MG: 5 TABLET ORAL at 15:46

## 2020-01-01 RX ADMIN — REMDESIVIR 100 MG: 100 INJECTION, POWDER, LYOPHILIZED, FOR SOLUTION INTRAVENOUS at 16:02

## 2020-01-01 RX ADMIN — IPRATROPIUM BROMIDE AND ALBUTEROL 1 PUFF: 20; 100 SPRAY, METERED RESPIRATORY (INHALATION) at 07:36

## 2020-01-01 RX ADMIN — ALPRAZOLAM 1 MG: 0.5 TABLET ORAL at 21:53

## 2020-01-01 RX ADMIN — BUDESONIDE AND FORMOTEROL FUMARATE DIHYDRATE 2 PUFF: 160; 4.5 AEROSOL RESPIRATORY (INHALATION) at 20:09

## 2020-01-01 RX ADMIN — POLYETHYLENE GLYCOL 3350 17 G: 17 POWDER, FOR SOLUTION ORAL at 09:33

## 2020-01-01 RX ADMIN — INSULIN LISPRO 2 UNITS: 100 INJECTION, SOLUTION INTRAVENOUS; SUBCUTANEOUS at 13:50

## 2020-01-01 RX ADMIN — RANOLAZINE 1000 MG: 500 TABLET, FILM COATED, EXTENDED RELEASE ORAL at 22:21

## 2020-01-01 RX ADMIN — Medication 2 PUFF: at 08:53

## 2020-01-01 RX ADMIN — MONTELUKAST 10 MG: 10 TABLET, FILM COATED ORAL at 21:52

## 2020-01-01 RX ADMIN — METOPROLOL TARTRATE 25 MG: 25 TABLET, FILM COATED ORAL at 08:44

## 2020-01-01 RX ADMIN — OXYCODONE HYDROCHLORIDE AND ACETAMINOPHEN 1 TABLET: 7.5; 325 TABLET ORAL at 18:12

## 2020-01-01 RX ADMIN — ENOXAPARIN SODIUM 30 MG: 30 INJECTION SUBCUTANEOUS at 20:55

## 2020-01-01 RX ADMIN — SODIUM CHLORIDE, PRESERVATIVE FREE 10 ML: 5 INJECTION INTRAVENOUS at 09:35

## 2020-01-01 RX ADMIN — INSULIN LISPRO 2 UNITS: 100 INJECTION, SOLUTION INTRAVENOUS; SUBCUTANEOUS at 22:32

## 2020-01-01 RX ADMIN — LISINOPRIL 5 MG: 5 TABLET ORAL at 08:39

## 2020-01-01 RX ADMIN — IPRATROPIUM BROMIDE AND ALBUTEROL 1 PUFF: 20; 100 SPRAY, METERED RESPIRATORY (INHALATION) at 20:16

## 2020-01-01 RX ADMIN — PANTOPRAZOLE SODIUM 20 MG: 20 TABLET, DELAYED RELEASE ORAL at 09:21

## 2020-01-01 RX ADMIN — MONTELUKAST 10 MG: 10 TABLET, FILM COATED ORAL at 21:42

## 2020-01-01 RX ADMIN — REMDESIVIR 100 MG: 100 INJECTION, POWDER, LYOPHILIZED, FOR SOLUTION INTRAVENOUS at 18:36

## 2020-01-01 RX ADMIN — ENOXAPARIN SODIUM 30 MG: 30 INJECTION SUBCUTANEOUS at 09:28

## 2020-01-01 RX ADMIN — Medication 10 MEQ: at 16:01

## 2020-01-01 RX ADMIN — CARBAMAZEPINE 300 MG: 200 TABLET, EXTENDED RELEASE ORAL at 22:21

## 2020-01-01 RX ADMIN — INSULIN LISPRO 2 UNITS: 100 INJECTION, SOLUTION INTRAVENOUS; SUBCUTANEOUS at 17:09

## 2020-01-01 RX ADMIN — PANTOPRAZOLE SODIUM 20 MG: 20 TABLET, DELAYED RELEASE ORAL at 08:39

## 2020-01-01 RX ADMIN — INSULIN LISPRO 1 UNITS: 100 INJECTION, SOLUTION INTRAVENOUS; SUBCUTANEOUS at 20:40

## 2020-01-01 RX ADMIN — IPRATROPIUM BROMIDE AND ALBUTEROL 1 PUFF: 20; 100 SPRAY, METERED RESPIRATORY (INHALATION) at 20:09

## 2020-01-01 RX ADMIN — PSYLLIUM HUSK 1 PACKET: 3.4 GRANULE ORAL at 09:02

## 2020-01-01 RX ADMIN — ENOXAPARIN SODIUM 30 MG: 30 INJECTION SUBCUTANEOUS at 08:48

## 2020-01-01 RX ADMIN — RANOLAZINE 1000 MG: 500 TABLET, FILM COATED, EXTENDED RELEASE ORAL at 21:00

## 2020-01-01 RX ADMIN — IPRATROPIUM BROMIDE AND ALBUTEROL 1 PUFF: 20; 100 SPRAY, METERED RESPIRATORY (INHALATION) at 20:11

## 2020-01-01 RX ADMIN — ASPIRIN 81 MG: 81 TABLET, COATED ORAL at 09:29

## 2020-01-01 RX ADMIN — IPRATROPIUM BROMIDE AND ALBUTEROL 1 PUFF: 20; 100 SPRAY, METERED RESPIRATORY (INHALATION) at 12:13

## 2020-01-01 RX ADMIN — BUDESONIDE AND FORMOTEROL FUMARATE DIHYDRATE 2 PUFF: 160; 4.5 AEROSOL RESPIRATORY (INHALATION) at 19:32

## 2020-01-01 RX ADMIN — DEXAMETHASONE 6 MG: 6 TABLET ORAL at 09:29

## 2020-01-01 RX ADMIN — PSYLLIUM HUSK 1 PACKET: 3.4 GRANULE ORAL at 17:08

## 2020-01-01 RX ADMIN — ASPIRIN 81 MG: 81 TABLET, COATED ORAL at 08:43

## 2020-01-01 RX ADMIN — ALPRAZOLAM 1 MG: 0.5 TABLET ORAL at 13:58

## 2020-01-01 RX ADMIN — SODIUM CHLORIDE, PRESERVATIVE FREE 10 ML: 5 INJECTION INTRAVENOUS at 08:44

## 2020-01-01 RX ADMIN — IPRATROPIUM BROMIDE AND ALBUTEROL 1 PUFF: 20; 100 SPRAY, METERED RESPIRATORY (INHALATION) at 08:32

## 2020-01-01 RX ADMIN — ISOSORBIDE MONONITRATE 60 MG: 60 TABLET, EXTENDED RELEASE ORAL at 07:58

## 2020-01-01 RX ADMIN — RANOLAZINE 1000 MG: 500 TABLET, FILM COATED, EXTENDED RELEASE ORAL at 21:42

## 2020-01-01 RX ADMIN — ENOXAPARIN SODIUM 30 MG: 30 INJECTION SUBCUTANEOUS at 08:36

## 2020-01-01 RX ADMIN — ACETAMINOPHEN 650 MG: 325 TABLET ORAL at 05:33

## 2020-01-01 RX ADMIN — BUDESONIDE AND FORMOTEROL FUMARATE DIHYDRATE 2 PUFF: 160; 4.5 AEROSOL RESPIRATORY (INHALATION) at 20:16

## 2020-01-01 RX ADMIN — DEXAMETHASONE 20 MG: 4 TABLET ORAL at 11:03

## 2020-01-01 RX ADMIN — ACETAMINOPHEN 650 MG: 325 TABLET ORAL at 12:28

## 2020-01-01 RX ADMIN — INSULIN LISPRO 4 UNITS: 100 INJECTION, SOLUTION INTRAVENOUS; SUBCUTANEOUS at 17:41

## 2020-01-01 RX ADMIN — LORAZEPAM 1 MG: 2 INJECTION INTRAMUSCULAR; INTRAVENOUS at 22:45

## 2020-01-01 RX ADMIN — Medication 10 MEQ: at 14:40

## 2020-01-01 RX ADMIN — RANOLAZINE 1000 MG: 500 TABLET, FILM COATED, EXTENDED RELEASE ORAL at 20:40

## 2020-01-01 RX ADMIN — MONTELUKAST 10 MG: 10 TABLET, FILM COATED ORAL at 22:01

## 2020-01-01 RX ADMIN — BUDESONIDE AND FORMOTEROL FUMARATE DIHYDRATE 2 PUFF: 160; 4.5 AEROSOL RESPIRATORY (INHALATION) at 07:36

## 2020-01-01 RX ADMIN — METOPROLOL TARTRATE 25 MG: 25 TABLET, FILM COATED ORAL at 09:29

## 2020-01-01 RX ADMIN — LEVOFLOXACIN 750 MG: 500 TABLET, FILM COATED ORAL at 09:02

## 2020-01-01 RX ADMIN — LEVOTHYROXINE SODIUM 100 MCG: 0.1 TABLET ORAL at 07:00

## 2020-01-01 RX ADMIN — METHYLPREDNISOLONE SODIUM SUCCINATE 125 MG: 125 INJECTION, POWDER, FOR SOLUTION INTRAMUSCULAR; INTRAVENOUS at 23:40

## 2020-01-01 RX ADMIN — METOPROLOL TARTRATE 25 MG: 25 TABLET, FILM COATED ORAL at 21:52

## 2020-01-01 RX ADMIN — IPRATROPIUM BROMIDE AND ALBUTEROL 1 PUFF: 20; 100 SPRAY, METERED RESPIRATORY (INHALATION) at 08:34

## 2020-01-01 RX ADMIN — IPRATROPIUM BROMIDE AND ALBUTEROL 1 PUFF: 20; 100 SPRAY, METERED RESPIRATORY (INHALATION) at 16:29

## 2020-01-01 RX ADMIN — MORPHINE SULFATE 2 MG: 2 INJECTION, SOLUTION INTRAMUSCULAR; INTRAVENOUS at 12:45

## 2020-01-01 RX ADMIN — ACETAMINOPHEN 650 MG: 325 TABLET ORAL at 21:51

## 2020-01-01 RX ADMIN — POTASSIUM CHLORIDE 20 MEQ: 1500 TABLET, EXTENDED RELEASE ORAL at 12:32

## 2020-01-01 RX ADMIN — LORAZEPAM 1 MG: 2 INJECTION INTRAMUSCULAR; INTRAVENOUS at 18:00

## 2020-01-01 RX ADMIN — ENOXAPARIN SODIUM 30 MG: 30 INJECTION SUBCUTANEOUS at 09:21

## 2020-01-01 RX ADMIN — ALPRAZOLAM 1 MG: 0.5 TABLET ORAL at 00:15

## 2020-01-01 RX ADMIN — INSULIN LISPRO 2 UNITS: 100 INJECTION, SOLUTION INTRAVENOUS; SUBCUTANEOUS at 13:12

## 2020-01-01 RX ADMIN — ENOXAPARIN SODIUM 30 MG: 30 INJECTION SUBCUTANEOUS at 08:38

## 2020-01-01 RX ADMIN — IPRATROPIUM BROMIDE AND ALBUTEROL 1 PUFF: 20; 100 SPRAY, METERED RESPIRATORY (INHALATION) at 21:04

## 2020-01-01 RX ADMIN — PSYLLIUM HUSK 1 PACKET: 3.4 GRANULE ORAL at 09:17

## 2020-01-01 RX ADMIN — ENOXAPARIN SODIUM 30 MG: 30 INJECTION SUBCUTANEOUS at 22:18

## 2020-01-01 RX ADMIN — ATORVASTATIN CALCIUM 80 MG: 80 TABLET, FILM COATED ORAL at 08:36

## 2020-01-01 RX ADMIN — LISINOPRIL 5 MG: 5 TABLET ORAL at 08:53

## 2020-01-01 RX ADMIN — ENOXAPARIN SODIUM 30 MG: 30 INJECTION SUBCUTANEOUS at 20:40

## 2020-01-01 RX ADMIN — ENOXAPARIN SODIUM 30 MG: 30 INJECTION SUBCUTANEOUS at 22:29

## 2020-01-01 RX ADMIN — IPRATROPIUM BROMIDE AND ALBUTEROL 1 PUFF: 20; 100 SPRAY, METERED RESPIRATORY (INHALATION) at 08:04

## 2020-01-01 RX ADMIN — LORAZEPAM 1 MG: 2 INJECTION INTRAMUSCULAR; INTRAVENOUS at 15:07

## 2020-01-01 RX ADMIN — PANTOPRAZOLE SODIUM 20 MG: 20 TABLET, DELAYED RELEASE ORAL at 08:44

## 2020-01-01 RX ADMIN — PREGABALIN 50 MG: 50 CAPSULE ORAL at 23:13

## 2020-01-01 RX ADMIN — PANTOPRAZOLE SODIUM 20 MG: 20 TABLET, DELAYED RELEASE ORAL at 08:52

## 2020-01-01 RX ADMIN — ACETAMINOPHEN 650 MG: 325 TABLET ORAL at 13:49

## 2020-01-01 RX ADMIN — DEXAMETHASONE 20 MG: 4 TABLET ORAL at 08:52

## 2020-01-01 RX ADMIN — ASPIRIN 81 MG: 81 TABLET, COATED ORAL at 08:39

## 2020-01-01 RX ADMIN — AMLODIPINE BESYLATE 2.5 MG: 5 TABLET ORAL at 09:30

## 2020-01-01 RX ADMIN — ENOXAPARIN SODIUM 30 MG: 30 INJECTION SUBCUTANEOUS at 08:44

## 2020-01-01 RX ADMIN — OXYCODONE HYDROCHLORIDE AND ACETAMINOPHEN 1 TABLET: 7.5; 325 TABLET ORAL at 03:51

## 2020-01-01 RX ADMIN — PSYLLIUM HUSK 1 PACKET: 3.4 GRANULE ORAL at 08:49

## 2020-01-01 RX ADMIN — PREGABALIN 50 MG: 50 CAPSULE ORAL at 23:59

## 2020-01-01 RX ADMIN — IPRATROPIUM BROMIDE AND ALBUTEROL 1 PUFF: 20; 100 SPRAY, METERED RESPIRATORY (INHALATION) at 12:26

## 2020-01-01 RX ADMIN — METOPROLOL TARTRATE 25 MG: 25 TABLET, FILM COATED ORAL at 08:53

## 2020-01-01 RX ADMIN — LISINOPRIL 5 MG: 5 TABLET ORAL at 09:29

## 2020-01-01 RX ADMIN — IPRATROPIUM BROMIDE AND ALBUTEROL 1 PUFF: 20; 100 SPRAY, METERED RESPIRATORY (INHALATION) at 21:06

## 2020-01-01 RX ADMIN — DEXAMETHASONE 6 MG: 6 TABLET ORAL at 06:52

## 2020-01-01 RX ADMIN — PREGABALIN 50 MG: 50 CAPSULE ORAL at 22:21

## 2020-01-01 RX ADMIN — ASPIRIN 81 MG: 81 TABLET, COATED ORAL at 09:19

## 2020-01-01 RX ADMIN — ATORVASTATIN CALCIUM 80 MG: 80 TABLET, FILM COATED ORAL at 09:29

## 2020-01-01 RX ADMIN — PSYLLIUM HUSK 1 PACKET: 3.4 GRANULE ORAL at 07:58

## 2020-01-01 RX ADMIN — SODIUM CHLORIDE, PRESERVATIVE FREE 10 ML: 5 INJECTION INTRAVENOUS at 07:59

## 2020-01-01 RX ADMIN — IPRATROPIUM BROMIDE AND ALBUTEROL 1 PUFF: 20; 100 SPRAY, METERED RESPIRATORY (INHALATION) at 08:54

## 2020-01-01 RX ADMIN — ISOSORBIDE MONONITRATE 60 MG: 60 TABLET, EXTENDED RELEASE ORAL at 09:03

## 2020-01-01 RX ADMIN — CARBAMAZEPINE 400 MG: 200 TABLET, EXTENDED RELEASE ORAL at 09:18

## 2020-01-01 RX ADMIN — LISINOPRIL 5 MG: 5 TABLET ORAL at 08:49

## 2020-01-01 RX ADMIN — LISINOPRIL 5 MG: 5 TABLET ORAL at 07:58

## 2020-01-01 RX ADMIN — PSYLLIUM HUSK 1 PACKET: 3.4 GRANULE ORAL at 12:19

## 2020-01-01 RX ADMIN — AMLODIPINE BESYLATE 2.5 MG: 5 TABLET ORAL at 08:53

## 2020-01-01 RX ADMIN — PANTOPRAZOLE SODIUM 20 MG: 20 TABLET, DELAYED RELEASE ORAL at 09:19

## 2020-01-01 RX ADMIN — PSYLLIUM HUSK 1 PACKET: 3.4 GRANULE ORAL at 17:57

## 2020-01-01 RX ADMIN — AMLODIPINE BESYLATE 2.5 MG: 5 TABLET ORAL at 09:03

## 2020-01-01 RX ADMIN — ALPRAZOLAM 1 MG: 0.5 TABLET ORAL at 00:04

## 2020-01-01 RX ADMIN — LEVOFLOXACIN 750 MG: 500 TABLET, FILM COATED ORAL at 07:58

## 2020-01-01 RX ADMIN — INSULIN LISPRO 4 UNITS: 100 INJECTION, SOLUTION INTRAVENOUS; SUBCUTANEOUS at 12:57

## 2020-01-01 RX ADMIN — SODIUM CHLORIDE, PRESERVATIVE FREE 10 ML: 5 INJECTION INTRAVENOUS at 08:53

## 2020-01-01 RX ADMIN — IPRATROPIUM BROMIDE AND ALBUTEROL 1 PUFF: 20; 100 SPRAY, METERED RESPIRATORY (INHALATION) at 17:02

## 2020-01-01 RX ADMIN — LEVOTHYROXINE SODIUM 100 MCG: 0.1 TABLET ORAL at 05:08

## 2020-01-01 RX ADMIN — FUROSEMIDE 20 MG: 10 INJECTION, SOLUTION INTRAMUSCULAR; INTRAVENOUS at 13:36

## 2020-01-01 RX ADMIN — SODIUM CHLORIDE, PRESERVATIVE FREE 10 ML: 5 INJECTION INTRAVENOUS at 22:23

## 2020-01-01 RX ADMIN — ATORVASTATIN CALCIUM 80 MG: 80 TABLET, FILM COATED ORAL at 09:19

## 2020-01-01 RX ADMIN — PSYLLIUM HUSK 1 PACKET: 3.4 GRANULE ORAL at 09:21

## 2020-01-01 RX ADMIN — CARBAMAZEPINE 400 MG: 200 TABLET, EXTENDED RELEASE ORAL at 09:04

## 2020-01-01 RX ADMIN — ASPIRIN 81 MG: 81 TABLET, COATED ORAL at 08:53

## 2020-01-01 RX ADMIN — ATORVASTATIN CALCIUM 80 MG: 80 TABLET, FILM COATED ORAL at 08:49

## 2020-01-01 RX ADMIN — POLYETHYLENE GLYCOL 3350 17 G: 17 POWDER, FOR SOLUTION ORAL at 17:57

## 2020-01-01 RX ADMIN — ACETAMINOPHEN 650 MG: 325 TABLET ORAL at 22:01

## 2020-01-01 RX ADMIN — METOPROLOL TARTRATE 25 MG: 25 TABLET, FILM COATED ORAL at 08:39

## 2020-01-01 RX ADMIN — CARBAMAZEPINE 300 MG: 200 TABLET, EXTENDED RELEASE ORAL at 09:34

## 2020-01-01 RX ADMIN — SODIUM CHLORIDE, PRESERVATIVE FREE 10 ML: 5 INJECTION INTRAVENOUS at 21:44

## 2020-01-01 RX ADMIN — POLYETHYLENE GLYCOL 3350 17 G: 17 POWDER, FOR SOLUTION ORAL at 08:51

## 2020-01-01 RX ADMIN — OXYCODONE HYDROCHLORIDE AND ACETAMINOPHEN 1 TABLET: 7.5; 325 TABLET ORAL at 13:49

## 2020-01-01 RX ADMIN — CARBAMAZEPINE 300 MG: 200 TABLET, EXTENDED RELEASE ORAL at 20:40

## 2020-01-01 RX ADMIN — PANTOPRAZOLE SODIUM 20 MG: 20 TABLET, DELAYED RELEASE ORAL at 09:03

## 2020-01-01 RX ADMIN — OXYCODONE HYDROCHLORIDE AND ACETAMINOPHEN 1 TABLET: 7.5; 325 TABLET ORAL at 09:06

## 2020-01-01 RX ADMIN — LEVOFLOXACIN 750 MG: 500 TABLET, FILM COATED ORAL at 08:39

## 2020-01-01 RX ADMIN — ENOXAPARIN SODIUM 30 MG: 30 INJECTION SUBCUTANEOUS at 20:44

## 2020-01-01 RX ADMIN — ENOXAPARIN SODIUM 30 MG: 30 INJECTION SUBCUTANEOUS at 09:04

## 2020-01-01 RX ADMIN — CEFTRIAXONE 1 G: 1 INJECTION, POWDER, FOR SOLUTION INTRAMUSCULAR; INTRAVENOUS at 23:59

## 2020-01-01 RX ADMIN — METOPROLOL TARTRATE 25 MG: 25 TABLET, FILM COATED ORAL at 21:00

## 2020-01-01 RX ADMIN — REMDESIVIR 100 MG: 100 INJECTION, POWDER, LYOPHILIZED, FOR SOLUTION INTRAVENOUS at 17:08

## 2020-01-01 RX ADMIN — PSYLLIUM HUSK 1 PACKET: 3.4 GRANULE ORAL at 16:15

## 2020-01-01 RX ADMIN — PSYLLIUM HUSK 1 PACKET: 3.4 GRANULE ORAL at 08:38

## 2020-01-01 RX ADMIN — CARBAMAZEPINE 300 MG: 200 TABLET, EXTENDED RELEASE ORAL at 09:06

## 2020-01-01 RX ADMIN — ALPRAZOLAM 1 MG: 0.5 TABLET ORAL at 22:27

## 2020-01-01 RX ADMIN — REMDESIVIR 100 MG: 100 INJECTION, POWDER, LYOPHILIZED, FOR SOLUTION INTRAVENOUS at 15:32

## 2020-01-01 RX ADMIN — Medication 1 EACH: at 01:32

## 2020-01-01 RX ADMIN — ALPRAZOLAM 1 MG: 0.5 TABLET ORAL at 00:51

## 2020-01-01 RX ADMIN — ACETAMINOPHEN 650 MG: 325 TABLET ORAL at 13:58

## 2020-01-01 RX ADMIN — LEVOFLOXACIN 750 MG: 500 TABLET, FILM COATED ORAL at 09:21

## 2020-01-01 RX ADMIN — RANOLAZINE 1000 MG: 500 TABLET, FILM COATED, EXTENDED RELEASE ORAL at 08:35

## 2020-01-01 RX ADMIN — RANOLAZINE 1000 MG: 500 TABLET, FILM COATED, EXTENDED RELEASE ORAL at 12:13

## 2020-01-01 RX ADMIN — PSYLLIUM HUSK 1 PACKET: 3.4 GRANULE ORAL at 11:22

## 2020-01-01 RX ADMIN — SODIUM CHLORIDE, PRESERVATIVE FREE 10 ML: 5 INJECTION INTRAVENOUS at 21:49

## 2020-01-01 RX ADMIN — ENOXAPARIN SODIUM 30 MG: 30 INJECTION SUBCUTANEOUS at 21:44

## 2020-01-01 RX ADMIN — ISOSORBIDE MONONITRATE 60 MG: 60 TABLET, EXTENDED RELEASE ORAL at 08:39

## 2020-01-01 RX ADMIN — METOPROLOL TARTRATE 25 MG: 25 TABLET, FILM COATED ORAL at 07:59

## 2020-01-01 RX ADMIN — IPRATROPIUM BROMIDE AND ALBUTEROL 1 PUFF: 20; 100 SPRAY, METERED RESPIRATORY (INHALATION) at 17:16

## 2020-01-01 RX ADMIN — ATORVASTATIN CALCIUM 80 MG: 80 TABLET, FILM COATED ORAL at 09:21

## 2020-01-01 RX ADMIN — ASPIRIN 81 MG: 81 TABLET, COATED ORAL at 08:49

## 2020-01-01 RX ADMIN — ATORVASTATIN CALCIUM 80 MG: 80 TABLET, FILM COATED ORAL at 09:30

## 2020-01-01 RX ADMIN — BUDESONIDE AND FORMOTEROL FUMARATE DIHYDRATE 2 PUFF: 160; 4.5 AEROSOL RESPIRATORY (INHALATION) at 09:58

## 2020-01-01 RX ADMIN — ACETAMINOPHEN 650 MG: 325 TABLET ORAL at 22:27

## 2020-01-01 RX ADMIN — CARBAMAZEPINE 300 MG: 200 TABLET, EXTENDED RELEASE ORAL at 21:52

## 2020-01-01 RX ADMIN — ISOSORBIDE MONONITRATE 60 MG: 60 TABLET, EXTENDED RELEASE ORAL at 15:46

## 2020-01-01 RX ADMIN — IPRATROPIUM BROMIDE AND ALBUTEROL 1 PUFF: 20; 100 SPRAY, METERED RESPIRATORY (INHALATION) at 13:00

## 2020-01-01 RX ADMIN — BUDESONIDE AND FORMOTEROL FUMARATE DIHYDRATE 2 PUFF: 160; 4.5 AEROSOL RESPIRATORY (INHALATION) at 08:09

## 2020-01-01 RX ADMIN — IPRATROPIUM BROMIDE AND ALBUTEROL 1 PUFF: 20; 100 SPRAY, METERED RESPIRATORY (INHALATION) at 16:59

## 2020-01-01 RX ADMIN — BUDESONIDE AND FORMOTEROL FUMARATE DIHYDRATE 2 PUFF: 160; 4.5 AEROSOL RESPIRATORY (INHALATION) at 20:14

## 2020-01-01 RX ADMIN — MORPHINE SULFATE 2 MG: 2 INJECTION, SOLUTION INTRAMUSCULAR; INTRAVENOUS at 18:00

## 2020-01-01 RX ADMIN — METOPROLOL TARTRATE 25 MG: 25 TABLET, FILM COATED ORAL at 21:50

## 2020-01-01 RX ADMIN — ASPIRIN 81 MG: 81 TABLET, COATED ORAL at 09:30

## 2020-01-01 RX ADMIN — SODIUM CHLORIDE, PRESERVATIVE FREE 10 ML: 5 INJECTION INTRAVENOUS at 20:42

## 2020-01-01 RX ADMIN — SODIUM CHLORIDE, PRESERVATIVE FREE 10 ML: 5 INJECTION INTRAVENOUS at 20:45

## 2020-01-01 RX ADMIN — LISINOPRIL 5 MG: 5 TABLET ORAL at 09:21

## 2020-01-01 RX ADMIN — BUDESONIDE AND FORMOTEROL FUMARATE DIHYDRATE 2 PUFF: 160; 4.5 AEROSOL RESPIRATORY (INHALATION) at 21:03

## 2020-01-01 RX ADMIN — PSYLLIUM HUSK 1 PACKET: 3.4 GRANULE ORAL at 16:57

## 2020-01-01 RX ADMIN — PANTOPRAZOLE SODIUM 20 MG: 20 TABLET, DELAYED RELEASE ORAL at 07:58

## 2020-01-01 RX ADMIN — ASPIRIN 81 MG: 81 TABLET, COATED ORAL at 09:21

## 2020-01-01 RX ADMIN — PSYLLIUM HUSK 1 PACKET: 3.4 GRANULE ORAL at 13:24

## 2020-01-01 RX ADMIN — CARBAMAZEPINE 300 MG: 200 TABLET, EXTENDED RELEASE ORAL at 09:30

## 2020-01-01 RX ADMIN — PSYLLIUM HUSK 1 PACKET: 3.4 GRANULE ORAL at 09:28

## 2020-01-01 RX ADMIN — PSYLLIUM HUSK 1 PACKET: 3.4 GRANULE ORAL at 17:38

## 2020-01-01 RX ADMIN — SODIUM CHLORIDE, PRESERVATIVE FREE 10 ML: 5 INJECTION INTRAVENOUS at 08:41

## 2020-01-01 RX ADMIN — AMLODIPINE BESYLATE 2.5 MG: 5 TABLET ORAL at 08:39

## 2020-01-01 RX ADMIN — ASPIRIN 81 MG: 81 TABLET, COATED ORAL at 09:02

## 2020-01-01 RX ADMIN — MONTELUKAST 10 MG: 10 TABLET, FILM COATED ORAL at 21:00

## 2020-01-01 RX ADMIN — LEVOFLOXACIN 750 MG: 500 TABLET, FILM COATED ORAL at 09:28

## 2020-01-01 RX ADMIN — RANOLAZINE 1000 MG: 500 TABLET, FILM COATED, EXTENDED RELEASE ORAL at 21:52

## 2020-01-01 RX ADMIN — PSYLLIUM HUSK 1 PACKET: 3.4 GRANULE ORAL at 18:36

## 2020-01-01 RX ADMIN — LEVOTHYROXINE SODIUM 100 MCG: 0.1 TABLET ORAL at 06:13

## 2020-01-01 RX ADMIN — METOPROLOL TARTRATE 25 MG: 25 TABLET, FILM COATED ORAL at 09:19

## 2020-01-01 RX ADMIN — OXYCODONE HYDROCHLORIDE AND ACETAMINOPHEN 1 TABLET: 7.5; 325 TABLET ORAL at 03:26

## 2020-01-01 RX ADMIN — AMLODIPINE BESYLATE 2.5 MG: 5 TABLET ORAL at 09:18

## 2020-01-01 RX ADMIN — ISOSORBIDE MONONITRATE 60 MG: 60 TABLET, EXTENDED RELEASE ORAL at 09:21

## 2020-01-01 RX ADMIN — RANOLAZINE 1000 MG: 500 TABLET, FILM COATED, EXTENDED RELEASE ORAL at 22:01

## 2020-01-01 RX ADMIN — ISOSORBIDE MONONITRATE 60 MG: 60 TABLET, EXTENDED RELEASE ORAL at 09:29

## 2020-01-01 RX ADMIN — AMLODIPINE BESYLATE 2.5 MG: 5 TABLET ORAL at 09:21

## 2020-01-01 RX ADMIN — PANTOPRAZOLE SODIUM 20 MG: 20 TABLET, DELAYED RELEASE ORAL at 08:49

## 2020-01-01 RX ADMIN — PSYLLIUM HUSK 1 PACKET: 3.4 GRANULE ORAL at 11:13

## 2020-01-01 RX ADMIN — ACETAMINOPHEN 650 MG: 325 TABLET ORAL at 08:47

## 2020-01-01 RX ADMIN — ISOSORBIDE MONONITRATE 60 MG: 60 TABLET, EXTENDED RELEASE ORAL at 08:35

## 2020-01-01 RX ADMIN — LEVOTHYROXINE SODIUM 100 MCG: 0.1 TABLET ORAL at 06:12

## 2020-01-01 RX ADMIN — IPRATROPIUM BROMIDE AND ALBUTEROL 1 PUFF: 20; 100 SPRAY, METERED RESPIRATORY (INHALATION) at 09:13

## 2020-01-01 RX ADMIN — DEXAMETHASONE 20 MG: 4 TABLET ORAL at 08:49

## 2020-01-01 RX ADMIN — ALPRAZOLAM 1 MG: 0.5 TABLET ORAL at 09:03

## 2020-01-01 RX ADMIN — ACETAMINOPHEN 650 MG: 325 TABLET ORAL at 06:12

## 2020-01-01 RX ADMIN — METOPROLOL TARTRATE 25 MG: 25 TABLET, FILM COATED ORAL at 08:35

## 2020-01-01 RX ADMIN — GUAIFENESIN AND DEXTROMETHORPHAN 5 ML: 100; 10 SYRUP ORAL at 22:20

## 2020-01-01 RX ADMIN — DEXAMETHASONE 6 MG: 6 TABLET ORAL at 09:03

## 2020-01-01 RX ADMIN — PSYLLIUM HUSK 1 PACKET: 3.4 GRANULE ORAL at 08:52

## 2020-01-01 RX ADMIN — IPRATROPIUM BROMIDE AND ALBUTEROL SULFATE 3 AMPULE: .5; 3 SOLUTION RESPIRATORY (INHALATION) at 22:53

## 2020-01-01 RX ADMIN — BUDESONIDE AND FORMOTEROL FUMARATE DIHYDRATE 2 PUFF: 160; 4.5 AEROSOL RESPIRATORY (INHALATION) at 20:05

## 2020-01-01 RX ADMIN — AMLODIPINE BESYLATE 2.5 MG: 5 TABLET ORAL at 08:49

## 2020-01-01 RX ADMIN — IPRATROPIUM BROMIDE AND ALBUTEROL 1 PUFF: 20; 100 SPRAY, METERED RESPIRATORY (INHALATION) at 09:57

## 2020-01-01 RX ADMIN — AZITHROMYCIN MONOHYDRATE 500 MG: 500 INJECTION, POWDER, LYOPHILIZED, FOR SOLUTION INTRAVENOUS at 01:22

## 2020-01-01 RX ADMIN — IPRATROPIUM BROMIDE AND ALBUTEROL 1 PUFF: 20; 100 SPRAY, METERED RESPIRATORY (INHALATION) at 12:01

## 2020-01-01 RX ADMIN — CARBAMAZEPINE 300 MG: 200 TABLET, EXTENDED RELEASE ORAL at 12:13

## 2020-01-01 RX ADMIN — AMLODIPINE BESYLATE 2.5 MG: 5 TABLET ORAL at 07:58

## 2020-01-01 RX ADMIN — IPRATROPIUM BROMIDE AND ALBUTEROL 1 PUFF: 20; 100 SPRAY, METERED RESPIRATORY (INHALATION) at 11:09

## 2020-01-01 RX ADMIN — LEVOTHYROXINE SODIUM 100 MCG: 0.1 TABLET ORAL at 07:22

## 2020-01-01 RX ADMIN — ALPRAZOLAM 1 MG: 0.5 TABLET ORAL at 23:37

## 2020-01-01 RX ADMIN — METOPROLOL TARTRATE 25 MG: 25 TABLET, FILM COATED ORAL at 21:42

## 2020-01-01 RX ADMIN — IPRATROPIUM BROMIDE AND ALBUTEROL 1 PUFF: 20; 100 SPRAY, METERED RESPIRATORY (INHALATION) at 20:05

## 2020-01-01 RX ADMIN — BUDESONIDE AND FORMOTEROL FUMARATE DIHYDRATE 2 PUFF: 160; 4.5 AEROSOL RESPIRATORY (INHALATION) at 08:32

## 2020-01-01 RX ADMIN — METOPROLOL TARTRATE 25 MG: 25 TABLET, FILM COATED ORAL at 09:03

## 2020-01-01 RX ADMIN — ACETAMINOPHEN 650 MG: 325 TABLET ORAL at 05:14

## 2020-01-01 RX ADMIN — MONTELUKAST 10 MG: 10 TABLET, FILM COATED ORAL at 22:20

## 2020-01-01 RX ADMIN — PANTOPRAZOLE SODIUM 20 MG: 20 TABLET, DELAYED RELEASE ORAL at 08:35

## 2020-01-01 RX ADMIN — PSYLLIUM HUSK 1 PACKET: 3.4 GRANULE ORAL at 13:49

## 2020-01-01 RX ADMIN — ENOXAPARIN SODIUM 30 MG: 30 INJECTION SUBCUTANEOUS at 08:52

## 2020-01-01 RX ADMIN — FUROSEMIDE 20 MG: 10 INJECTION, SOLUTION INTRAMUSCULAR; INTRAVENOUS at 08:43

## 2020-01-01 RX ADMIN — ACETAMINOPHEN 650 MG: 325 TABLET ORAL at 13:00

## 2020-01-01 RX ADMIN — ALPRAZOLAM 1 MG: 0.5 TABLET ORAL at 22:00

## 2020-01-01 RX ADMIN — SODIUM CHLORIDE, PRESERVATIVE FREE 10 ML: 5 INJECTION INTRAVENOUS at 22:02

## 2020-01-01 RX ADMIN — INSULIN LISPRO 1 UNITS: 100 INJECTION, SOLUTION INTRAVENOUS; SUBCUTANEOUS at 20:54

## 2020-01-01 RX ADMIN — GUAIFENESIN AND DEXTROMETHORPHAN 5 ML: 100; 10 SYRUP ORAL at 03:14

## 2020-01-01 RX ADMIN — BUDESONIDE AND FORMOTEROL FUMARATE DIHYDRATE 2 PUFF: 160; 4.5 AEROSOL RESPIRATORY (INHALATION) at 19:46

## 2020-01-01 RX ADMIN — DEXAMETHASONE 6 MG: 6 TABLET ORAL at 09:30

## 2020-01-01 RX ADMIN — BUDESONIDE AND FORMOTEROL FUMARATE DIHYDRATE 2 PUFF: 160; 4.5 AEROSOL RESPIRATORY (INHALATION) at 09:12

## 2020-01-01 RX ADMIN — POLYETHYLENE GLYCOL 3350 17 G: 17 POWDER, FOR SOLUTION ORAL at 07:58

## 2020-01-01 RX ADMIN — IPRATROPIUM BROMIDE AND ALBUTEROL 1 PUFF: 20; 100 SPRAY, METERED RESPIRATORY (INHALATION) at 07:48

## 2020-01-01 RX ADMIN — PSYLLIUM HUSK 1 PACKET: 3.4 GRANULE ORAL at 08:40

## 2020-01-01 RX ADMIN — LEVOFLOXACIN 750 MG: 500 TABLET, FILM COATED ORAL at 08:48

## 2020-01-01 RX ADMIN — ALPRAZOLAM 1 MG: 0.5 TABLET ORAL at 04:12

## 2020-01-01 RX ADMIN — INSULIN LISPRO 1 UNITS: 100 INJECTION, SOLUTION INTRAVENOUS; SUBCUTANEOUS at 21:44

## 2020-01-01 RX ADMIN — INSULIN LISPRO 1 UNITS: 100 INJECTION, SOLUTION INTRAVENOUS; SUBCUTANEOUS at 20:42

## 2020-01-01 RX ADMIN — RANOLAZINE 1000 MG: 500 TABLET, FILM COATED, EXTENDED RELEASE ORAL at 09:29

## 2020-01-01 RX ADMIN — PREGABALIN 50 MG: 50 CAPSULE ORAL at 00:04

## 2020-01-01 RX ADMIN — AMLODIPINE BESYLATE 2.5 MG: 5 TABLET ORAL at 08:35

## 2020-01-01 RX ADMIN — METOPROLOL TARTRATE 25 MG: 25 TABLET, FILM COATED ORAL at 20:55

## 2020-01-01 RX ADMIN — PREGABALIN 50 MG: 50 CAPSULE ORAL at 00:51

## 2020-01-01 RX ADMIN — ASPIRIN 81 MG: 81 TABLET, COATED ORAL at 08:35

## 2020-01-01 RX ADMIN — ASPIRIN 81 MG: 81 TABLET, COATED ORAL at 07:59

## 2020-01-01 RX ADMIN — ISOSORBIDE MONONITRATE 60 MG: 60 TABLET, EXTENDED RELEASE ORAL at 08:52

## 2020-01-01 RX ADMIN — IPRATROPIUM BROMIDE AND ALBUTEROL 1 PUFF: 20; 100 SPRAY, METERED RESPIRATORY (INHALATION) at 16:17

## 2020-01-01 RX ADMIN — ENOXAPARIN SODIUM 30 MG: 30 INJECTION SUBCUTANEOUS at 21:52

## 2020-01-01 RX ADMIN — RANOLAZINE 1000 MG: 500 TABLET, FILM COATED, EXTENDED RELEASE ORAL at 20:42

## 2020-01-01 RX ADMIN — PREGABALIN 50 MG: 50 CAPSULE ORAL at 21:52

## 2020-01-01 RX ADMIN — LEVOTHYROXINE SODIUM 100 MCG: 0.1 TABLET ORAL at 06:52

## 2020-01-01 RX ADMIN — PSYLLIUM HUSK 1 PACKET: 3.4 GRANULE ORAL at 12:47

## 2020-01-01 RX ADMIN — LEVOFLOXACIN 750 MG: 500 TABLET, FILM COATED ORAL at 09:29

## 2020-01-01 RX ADMIN — RANOLAZINE 1000 MG: 500 TABLET, FILM COATED, EXTENDED RELEASE ORAL at 08:38

## 2020-01-01 RX ADMIN — POLYETHYLENE GLYCOL 3350 17 G: 17 POWDER, FOR SOLUTION ORAL at 09:28

## 2020-01-01 RX ADMIN — RANOLAZINE 1000 MG: 500 TABLET, FILM COATED, EXTENDED RELEASE ORAL at 09:19

## 2020-01-01 RX ADMIN — IPRATROPIUM BROMIDE AND ALBUTEROL 1 PUFF: 20; 100 SPRAY, METERED RESPIRATORY (INHALATION) at 15:53

## 2020-01-01 RX ADMIN — SODIUM CHLORIDE, PRESERVATIVE FREE 10 ML: 5 INJECTION INTRAVENOUS at 21:02

## 2020-01-01 RX ADMIN — PSYLLIUM HUSK 1 PACKET: 3.4 GRANULE ORAL at 12:50

## 2020-01-01 RX ADMIN — ENOXAPARIN SODIUM 30 MG: 30 INJECTION SUBCUTANEOUS at 22:01

## 2020-01-01 RX ADMIN — INSULIN LISPRO 2 UNITS: 100 INJECTION, SOLUTION INTRAVENOUS; SUBCUTANEOUS at 17:38

## 2020-01-01 RX ADMIN — INSULIN LISPRO 1 UNITS: 100 INJECTION, SOLUTION INTRAVENOUS; SUBCUTANEOUS at 22:05

## 2020-01-01 RX ADMIN — OXYCODONE HYDROCHLORIDE AND ACETAMINOPHEN 1 TABLET: 7.5; 325 TABLET ORAL at 13:32

## 2020-01-01 RX ADMIN — MORPHINE SULFATE 2 MG: 2 INJECTION, SOLUTION INTRAMUSCULAR; INTRAVENOUS at 15:07

## 2020-01-01 RX ADMIN — ENOXAPARIN SODIUM 30 MG: 30 INJECTION SUBCUTANEOUS at 09:17

## 2020-01-01 RX ADMIN — INSULIN LISPRO 2 UNITS: 100 INJECTION, SOLUTION INTRAVENOUS; SUBCUTANEOUS at 17:15

## 2020-01-01 RX ADMIN — CARBAMAZEPINE 400 MG: 200 TABLET, EXTENDED RELEASE ORAL at 20:47

## 2020-01-01 RX ADMIN — MORPHINE SULFATE 2 MG: 2 INJECTION, SOLUTION INTRAMUSCULAR; INTRAVENOUS at 13:02

## 2020-01-01 RX ADMIN — METOPROLOL TARTRATE 25 MG: 25 TABLET, FILM COATED ORAL at 20:40

## 2020-01-01 RX ADMIN — ALBUTEROL SULFATE 2 PUFF: 108 AEROSOL, METERED RESPIRATORY (INHALATION) at 03:16

## 2020-01-01 RX ADMIN — RANOLAZINE 1000 MG: 500 TABLET, FILM COATED, EXTENDED RELEASE ORAL at 09:30

## 2020-01-01 RX ADMIN — INSULIN LISPRO 2 UNITS: 100 INJECTION, SOLUTION INTRAVENOUS; SUBCUTANEOUS at 16:47

## 2020-01-01 RX ADMIN — PREGABALIN 50 MG: 50 CAPSULE ORAL at 20:56

## 2020-01-01 RX ADMIN — INSULIN LISPRO 2 UNITS: 100 INJECTION, SOLUTION INTRAVENOUS; SUBCUTANEOUS at 18:12

## 2020-01-01 RX ADMIN — SODIUM CHLORIDE 20 ML: 9 INJECTION, SOLUTION INTRAVENOUS at 14:37

## 2020-01-01 RX ADMIN — PREGABALIN 50 MG: 50 CAPSULE ORAL at 22:28

## 2020-01-01 RX ADMIN — MONTELUKAST 10 MG: 10 TABLET, FILM COATED ORAL at 20:40

## 2020-01-01 RX ADMIN — DEXAMETHASONE 6 MG: 6 TABLET ORAL at 09:18

## 2020-01-01 RX ADMIN — LEVOTHYROXINE SODIUM 100 MCG: 0.1 TABLET ORAL at 07:29

## 2020-01-01 RX ADMIN — METOPROLOL TARTRATE 25 MG: 25 TABLET, FILM COATED ORAL at 20:42

## 2020-01-01 RX ADMIN — LISINOPRIL 5 MG: 5 TABLET ORAL at 08:35

## 2020-01-01 RX ADMIN — IPRATROPIUM BROMIDE AND ALBUTEROL 1 PUFF: 20; 100 SPRAY, METERED RESPIRATORY (INHALATION) at 16:11

## 2020-01-01 RX ADMIN — CARBAMAZEPINE 400 MG: 200 TABLET, EXTENDED RELEASE ORAL at 08:36

## 2020-01-01 RX ADMIN — ONDANSETRON 4 MG: 2 INJECTION INTRAMUSCULAR; INTRAVENOUS at 13:15

## 2020-01-01 RX ADMIN — ATORVASTATIN CALCIUM 80 MG: 80 TABLET, FILM COATED ORAL at 08:39

## 2020-01-01 RX ADMIN — LEVOTHYROXINE SODIUM 100 MCG: 0.1 TABLET ORAL at 05:14

## 2020-01-01 RX ADMIN — AZITHROMYCIN MONOHYDRATE 500 MG: 500 INJECTION, POWDER, LYOPHILIZED, FOR SOLUTION INTRAVENOUS at 01:32

## 2020-01-01 RX ADMIN — ALPRAZOLAM 1 MG: 0.5 TABLET ORAL at 22:21

## 2020-01-01 RX ADMIN — DEXAMETHASONE 6 MG: 6 TABLET ORAL at 08:39

## 2020-01-01 RX ADMIN — CARBAMAZEPINE 300 MG: 200 TABLET, EXTENDED RELEASE ORAL at 21:54

## 2020-01-01 RX ADMIN — IPRATROPIUM BROMIDE AND ALBUTEROL 1 PUFF: 20; 100 SPRAY, METERED RESPIRATORY (INHALATION) at 11:28

## 2020-01-01 RX ADMIN — METOPROLOL TARTRATE 25 MG: 25 TABLET, FILM COATED ORAL at 09:21

## 2020-01-01 RX ADMIN — PREGABALIN 50 MG: 50 CAPSULE ORAL at 22:00

## 2020-01-01 RX ADMIN — CARBAMAZEPINE 300 MG: 200 TABLET, EXTENDED RELEASE ORAL at 09:00

## 2020-01-01 RX ADMIN — GUAIFENESIN AND DEXTROMETHORPHAN 5 ML: 100; 10 SYRUP ORAL at 07:30

## 2020-01-01 RX ADMIN — SODIUM CHLORIDE, PRESERVATIVE FREE 10 ML: 5 INJECTION INTRAVENOUS at 09:04

## 2020-01-01 RX ADMIN — DEXAMETHASONE 6 MG: 6 TABLET ORAL at 08:35

## 2020-01-01 RX ADMIN — OXYCODONE HYDROCHLORIDE AND ACETAMINOPHEN 1 TABLET: 7.5; 325 TABLET ORAL at 10:54

## 2020-01-01 RX ADMIN — CARBAMAZEPINE 300 MG: 200 TABLET, EXTENDED RELEASE ORAL at 22:32

## 2020-01-01 RX ADMIN — BUDESONIDE AND FORMOTEROL FUMARATE DIHYDRATE 2 PUFF: 160; 4.5 AEROSOL RESPIRATORY (INHALATION) at 07:48

## 2020-01-01 RX ADMIN — CARBAMAZEPINE 400 MG: 200 TABLET, EXTENDED RELEASE ORAL at 15:46

## 2020-01-01 RX ADMIN — CARBAMAZEPINE 300 MG: 200 TABLET, EXTENDED RELEASE ORAL at 20:42

## 2020-01-01 RX ADMIN — SODIUM CHLORIDE, PRESERVATIVE FREE 10 ML: 5 INJECTION INTRAVENOUS at 08:50

## 2020-01-01 RX ADMIN — PSYLLIUM HUSK 1 PACKET: 3.4 GRANULE ORAL at 17:27

## 2020-01-01 RX ADMIN — LIDOCAINE HYDROCHLORIDE,EPINEPHRINE BITARTRATE 20 ML: 20; .01 INJECTION, SOLUTION INFILTRATION; PERINEURAL at 01:34

## 2020-01-01 RX ADMIN — INSULIN LISPRO 3 UNITS: 100 INJECTION, SOLUTION INTRAVENOUS; SUBCUTANEOUS at 22:29

## 2020-01-01 RX ADMIN — PANTOPRAZOLE SODIUM 20 MG: 20 TABLET, DELAYED RELEASE ORAL at 09:30

## 2020-01-01 RX ADMIN — AMLODIPINE BESYLATE 2.5 MG: 5 TABLET ORAL at 09:29

## 2020-01-01 RX ADMIN — CARBAMAZEPINE 300 MG: 200 TABLET, EXTENDED RELEASE ORAL at 08:39

## 2020-01-01 RX ADMIN — ONDANSETRON 4 MG: 2 INJECTION INTRAMUSCULAR; INTRAVENOUS at 22:01

## 2020-01-01 RX ADMIN — PSYLLIUM HUSK 1 PACKET: 3.4 GRANULE ORAL at 16:28

## 2020-01-01 RX ADMIN — SODIUM CHLORIDE, PRESERVATIVE FREE 10 ML: 5 INJECTION INTRAVENOUS at 20:53

## 2020-01-01 RX ADMIN — POLYETHYLENE GLYCOL 3350 17 G: 17 POWDER, FOR SOLUTION ORAL at 08:49

## 2020-01-01 RX ADMIN — LEVOFLOXACIN 750 MG: 500 TABLET, FILM COATED ORAL at 08:52

## 2020-01-01 RX ADMIN — DEXAMETHASONE 20 MG: 4 TABLET ORAL at 09:20

## 2020-01-01 RX ADMIN — RANOLAZINE 1000 MG: 500 TABLET, FILM COATED, EXTENDED RELEASE ORAL at 09:20

## 2020-01-01 RX ADMIN — PSYLLIUM HUSK 1 PACKET: 3.4 GRANULE ORAL at 17:43

## 2020-01-01 RX ADMIN — OXYCODONE HYDROCHLORIDE AND ACETAMINOPHEN 1 TABLET: 7.5; 325 TABLET ORAL at 17:08

## 2020-01-01 RX ADMIN — Medication 10 MEQ: at 12:19

## 2020-01-01 RX ADMIN — LEVOTHYROXINE SODIUM 100 MCG: 0.1 TABLET ORAL at 05:32

## 2020-01-01 RX ADMIN — BUDESONIDE AND FORMOTEROL FUMARATE DIHYDRATE 2 PUFF: 160; 4.5 AEROSOL RESPIRATORY (INHALATION) at 08:08

## 2020-01-01 RX ADMIN — ALPRAZOLAM 1 MG: 0.5 TABLET ORAL at 23:59

## 2020-01-01 RX ADMIN — IPRATROPIUM BROMIDE AND ALBUTEROL 1 PUFF: 20; 100 SPRAY, METERED RESPIRATORY (INHALATION) at 12:28

## 2020-01-01 RX ADMIN — INSULIN LISPRO 2 UNITS: 100 INJECTION, SOLUTION INTRAVENOUS; SUBCUTANEOUS at 12:47

## 2020-01-01 RX ADMIN — SODIUM CHLORIDE, PRESERVATIVE FREE 10 ML: 5 INJECTION INTRAVENOUS at 21:28

## 2020-01-01 RX ADMIN — IPRATROPIUM BROMIDE AND ALBUTEROL 1 PUFF: 20; 100 SPRAY, METERED RESPIRATORY (INHALATION) at 16:05

## 2020-01-01 RX ADMIN — CARBAMAZEPINE 300 MG: 200 TABLET, EXTENDED RELEASE ORAL at 07:59

## 2020-01-01 RX ADMIN — PREGABALIN 50 MG: 50 CAPSULE ORAL at 00:16

## 2020-01-01 RX ADMIN — LEVOFLOXACIN 750 MG: 500 TABLET, FILM COATED ORAL at 13:32

## 2020-01-01 RX ADMIN — REMDESIVIR 200 MG: 100 INJECTION, POWDER, LYOPHILIZED, FOR SOLUTION INTRAVENOUS at 19:02

## 2020-01-01 RX ADMIN — INSULIN LISPRO 2 UNITS: 100 INJECTION, SOLUTION INTRAVENOUS; SUBCUTANEOUS at 19:28

## 2020-01-01 RX ADMIN — AZITHROMYCIN MONOHYDRATE 500 MG: 500 INJECTION, POWDER, LYOPHILIZED, FOR SOLUTION INTRAVENOUS at 01:00

## 2020-01-01 RX ADMIN — ENOXAPARIN SODIUM 30 MG: 30 INJECTION SUBCUTANEOUS at 21:03

## 2020-01-01 RX ADMIN — IPRATROPIUM BROMIDE AND ALBUTEROL 1 PUFF: 20; 100 SPRAY, METERED RESPIRATORY (INHALATION) at 12:10

## 2020-01-01 RX ADMIN — SODIUM CHLORIDE, PRESERVATIVE FREE 10 ML: 5 INJECTION INTRAVENOUS at 08:34

## 2020-01-01 RX ADMIN — PANTOPRAZOLE SODIUM 20 MG: 20 TABLET, DELAYED RELEASE ORAL at 09:29

## 2020-01-01 RX ADMIN — PSYLLIUM HUSK 1 PACKET: 3.4 GRANULE ORAL at 16:02

## 2020-01-01 RX ADMIN — ATORVASTATIN CALCIUM 80 MG: 80 TABLET, FILM COATED ORAL at 08:44

## 2020-01-01 RX ADMIN — ENOXAPARIN SODIUM 30 MG: 30 INJECTION SUBCUTANEOUS at 21:49

## 2020-01-01 RX ADMIN — ENOXAPARIN SODIUM 30 MG: 30 INJECTION SUBCUTANEOUS at 07:58

## 2020-01-01 RX ADMIN — IPRATROPIUM BROMIDE AND ALBUTEROL 1 PUFF: 20; 100 SPRAY, METERED RESPIRATORY (INHALATION) at 19:46

## 2020-01-01 RX ADMIN — ATORVASTATIN CALCIUM 80 MG: 80 TABLET, FILM COATED ORAL at 07:58

## 2020-01-01 RX ADMIN — IPRATROPIUM BROMIDE AND ALBUTEROL 1 PUFF: 20; 100 SPRAY, METERED RESPIRATORY (INHALATION) at 07:50

## 2020-01-01 RX ADMIN — LISINOPRIL 5 MG: 5 TABLET ORAL at 09:03

## 2020-01-01 RX ADMIN — PREGABALIN 50 MG: 50 CAPSULE ORAL at 08:44

## 2020-01-01 RX ADMIN — BUDESONIDE AND FORMOTEROL FUMARATE DIHYDRATE 2 PUFF: 160; 4.5 AEROSOL RESPIRATORY (INHALATION) at 21:06

## 2020-01-01 RX ADMIN — RANOLAZINE 1000 MG: 500 TABLET, FILM COATED, EXTENDED RELEASE ORAL at 22:27

## 2020-01-01 RX ADMIN — DEXAMETHASONE 6 MG: 6 TABLET ORAL at 07:58

## 2020-01-01 RX ADMIN — LISINOPRIL 5 MG: 5 TABLET ORAL at 08:44

## 2020-01-01 RX ADMIN — MORPHINE SULFATE 2 MG: 2 INJECTION, SOLUTION INTRAMUSCULAR; INTRAVENOUS at 22:45

## 2020-01-01 RX ADMIN — RANOLAZINE 1000 MG: 500 TABLET, FILM COATED, EXTENDED RELEASE ORAL at 07:59

## 2020-01-01 RX ADMIN — OXYCODONE HYDROCHLORIDE AND ACETAMINOPHEN 1 TABLET: 7.5; 325 TABLET ORAL at 22:12

## 2020-01-01 RX ADMIN — RANOLAZINE 1000 MG: 500 TABLET, FILM COATED, EXTENDED RELEASE ORAL at 09:03

## 2020-01-01 ASSESSMENT — PAIN SCALES - GENERAL
PAINLEVEL_OUTOF10: 0
PAINLEVEL_OUTOF10: 0
PAINLEVEL_OUTOF10: 7
PAINLEVEL_OUTOF10: 0
PAINLEVEL_OUTOF10: 0
PAINLEVEL_OUTOF10: 10
PAINLEVEL_OUTOF10: 0
PAINLEVEL_OUTOF10: 6
PAINLEVEL_OUTOF10: 10
PAINLEVEL_OUTOF10: 0
PAINLEVEL_OUTOF10: 0
PAINLEVEL_OUTOF10: 7
PAINLEVEL_OUTOF10: 8
PAINLEVEL_OUTOF10: 0
PAINLEVEL_OUTOF10: 10
PAINLEVEL_OUTOF10: 0
PAINLEVEL_OUTOF10: 10
PAINLEVEL_OUTOF10: 0
PAINLEVEL_OUTOF10: 7
PAINLEVEL_OUTOF10: 0
PAINLEVEL_OUTOF10: 5
PAINLEVEL_OUTOF10: 0
PAINLEVEL_OUTOF10: 6
PAINLEVEL_OUTOF10: 10
PAINLEVEL_OUTOF10: 10
PAINLEVEL_OUTOF10: 0
PAINLEVEL_OUTOF10: 10
PAINLEVEL_OUTOF10: 0
PAINLEVEL_OUTOF10: 0
PAINLEVEL_OUTOF10: 4
PAINLEVEL_OUTOF10: 0
PAINLEVEL_OUTOF10: 7
PAINLEVEL_OUTOF10: 0
PAINLEVEL_OUTOF10: 7
PAINLEVEL_OUTOF10: 0
PAINLEVEL_OUTOF10: 6
PAINLEVEL_OUTOF10: 0
PAINLEVEL_OUTOF10: 7
PAINLEVEL_OUTOF10: 0
PAINLEVEL_OUTOF10: 6
PAINLEVEL_OUTOF10: 0
PAINLEVEL_OUTOF10: 0
PAINLEVEL_OUTOF10: 7
PAINLEVEL_OUTOF10: 0
PAINLEVEL_OUTOF10: 4
PAINLEVEL_OUTOF10: 3
PAINLEVEL_OUTOF10: 0
PAINLEVEL_OUTOF10: 4
PAINLEVEL_OUTOF10: 0
PAINLEVEL_OUTOF10: 10
PAINLEVEL_OUTOF10: 8
PAINLEVEL_OUTOF10: 0
PAINLEVEL_OUTOF10: 4
PAINLEVEL_OUTOF10: 0
PAINLEVEL_OUTOF10: 0
PAINLEVEL_OUTOF10: 9

## 2020-01-01 ASSESSMENT — PAIN DESCRIPTION - PAIN TYPE
TYPE: CHRONIC PAIN
TYPE: ACUTE PAIN
TYPE: CHRONIC PAIN
TYPE: CHRONIC PAIN
TYPE: ACUTE PAIN
TYPE: ACUTE PAIN
TYPE: CHRONIC PAIN
TYPE: ACUTE PAIN
TYPE: CHRONIC PAIN

## 2020-01-01 ASSESSMENT — PAIN DESCRIPTION - DESCRIPTORS
DESCRIPTORS: DISCOMFORT
DESCRIPTORS: SHOOTING
DESCRIPTORS: ACHING
DESCRIPTORS: BURNING;PRESSURE
DESCRIPTORS: DISCOMFORT
DESCRIPTORS: ACHING
DESCRIPTORS: SHARP
DESCRIPTORS: SHOOTING

## 2020-01-01 ASSESSMENT — PAIN DESCRIPTION - FREQUENCY
FREQUENCY: INTERMITTENT
FREQUENCY: CONTINUOUS
FREQUENCY: INTERMITTENT
FREQUENCY: INTERMITTENT
FREQUENCY: CONTINUOUS
FREQUENCY: CONTINUOUS

## 2020-01-01 ASSESSMENT — PAIN DESCRIPTION - LOCATION
LOCATION: GENERALIZED
LOCATION: CHEST
LOCATION: GENERALIZED
LOCATION: CHEST
LOCATION: GENERALIZED
LOCATION: GENERALIZED
LOCATION: HEAD;NECK
LOCATION: GENERALIZED

## 2020-01-01 ASSESSMENT — PAIN - FUNCTIONAL ASSESSMENT
PAIN_FUNCTIONAL_ASSESSMENT: ACTIVITIES ARE NOT PREVENTED

## 2020-01-01 ASSESSMENT — ENCOUNTER SYMPTOMS
ABDOMINAL PAIN: 0
DIARRHEA: 0
SHORTNESS OF BREATH: 0
NAUSEA: 0
COUGH: 0
RHINORRHEA: 0
ABDOMINAL DISTENTION: 0
NAUSEA: 0
BLOOD IN STOOL: 0
BACK PAIN: 0
ABDOMINAL PAIN: 0
DIARRHEA: 0
EYE PAIN: 0
VOMITING: 0
COUGH: 0
CONSTIPATION: 0
VOMITING: 0
ABDOMINAL PAIN: 0
ABDOMINAL PAIN: 0
BLOOD IN STOOL: 0
DIARRHEA: 0
ABDOMINAL DISTENTION: 0
SHORTNESS OF BREATH: 1
WHEEZING: 0
CONSTIPATION: 0
EYE DISCHARGE: 0
SHORTNESS OF BREATH: 0
COUGH: 0
CHEST TIGHTNESS: 0
SHORTNESS OF BREATH: 0
SORE THROAT: 0
CHEST TIGHTNESS: 0
SHORTNESS OF BREATH: 0
NAUSEA: 0
EYE PAIN: 0
VOMITING: 0

## 2020-01-01 ASSESSMENT — PAIN DESCRIPTION - ONSET
ONSET: ON-GOING
ONSET: SUDDEN
ONSET: ON-GOING
ONSET: PROGRESSIVE
ONSET: ON-GOING

## 2020-01-01 ASSESSMENT — PAIN DESCRIPTION - PROGRESSION
CLINICAL_PROGRESSION: GRADUALLY WORSENING
CLINICAL_PROGRESSION: NOT CHANGED
CLINICAL_PROGRESSION: GRADUALLY WORSENING
CLINICAL_PROGRESSION: NOT CHANGED
CLINICAL_PROGRESSION: GRADUALLY WORSENING
CLINICAL_PROGRESSION: NOT CHANGED

## 2020-01-01 ASSESSMENT — PATIENT HEALTH QUESTIONNAIRE - PHQ9
1. LITTLE INTEREST OR PLEASURE IN DOING THINGS: 0
SUM OF ALL RESPONSES TO PHQ9 QUESTIONS 1 & 2: 0
SUM OF ALL RESPONSES TO PHQ QUESTIONS 1-9: 0
SUM OF ALL RESPONSES TO PHQ QUESTIONS 1-9: 0
2. FEELING DOWN, DEPRESSED OR HOPELESS: 0

## 2020-01-01 ASSESSMENT — PAIN DESCRIPTION - ORIENTATION
ORIENTATION: MID

## 2020-01-15 NOTE — PROGRESS NOTES
Pina Persaud  1935  [id number]      HISTORY OF PRESENT ILLNESS:  Ms. Eryn Steel is a 81 y.o. female returns for a follow up visit for pain management  She has a diagnosis of   1. Chronic pain syndrome    2. Primary osteoarthritis of right knee    3. Cervical radiculitis    4. Cervical spondylosis without myelopathy    5. Trigeminal neuralgia    6. Degeneration of cervical intervertebral disc    . She complains of pain in the Low back  She rates the pain 8/10 and describes it as sharp, aching. Current treatment regimen has helped relieve about 80% of the pain. She denies any side effects from the current pain regimen. Patient reports that since the last follow up visit the physical functioning is unchanged, family/social relationships are unchanged, mood is unchanged sleep patterns are unchanged, and that the overall functioning is unchanged. Patient denies misusing/abusing her narcotic pain medications or using any illegal drugs. There are No indicators for possible drug abuse, addiction or diversion problems. Patient states she has been doing fair and managing somewhat with the medications. She says she is using Lyrica along with Percocet and Voltaren gel as needed . She mentions she is managing her ADls and house chores. She reports her knee is doing better she says she is using Xanax very seldom and has been able to cut back. ALLERGIES: Patients list of allergies were reviewed     MEDICATIONS: Ms. Eryn Steel list of medications were reviewed. Her current medications are   Outpatient Medications Prior to Visit   Medication Sig Dispense Refill    [START ON 2/22/2020] ALPRAZolam (XANAX) 1 MG tablet TAKE ONE TABLET BY MOUTH TWICE A DAY AS NEEDED FOR ANXIETY. NO ACOHOL. NO DRIVING. Do not take with percocet.  60 tablet 0    levothyroxine (SYNTHROID) 100 MCG tablet TAKE ONE TABLET BY MOUTH DAILY 90 tablet 0    diclofenac sodium (VOLTAREN) 1 % GEL Apply 2-4 grams to right knee TID 5 Tube 1    allopurinol (ZYLOPRIM) 300 MG tablet Take 1 tablet by mouth daily 90 tablet 1    vitamin D (ERGOCALCIFEROL) 88846 units CAPS capsule TAKE ONE CAPSULE BY MOUTH ONCE WEEKLY 12 capsule 3    alendronate (FOSAMAX) 70 MG tablet Take 70 mg by mouth every 7 days      pantoprazole (PROTONIX) 20 MG tablet Take 20 mg by mouth daily      ranolazine (RANEXA) 500 MG extended release tablet Take 500 mg by mouth 2 times daily      atorvastatin (LIPITOR) 80 MG tablet Take 80 mg by mouth daily      amLODIPine (NORVASC) 5 MG tablet Take 2.5 mg by mouth daily       carBAMazepine (TEGRETOL-XR) 100 MG extended release tablet Take 1 tablet by mouth 2 times daily (Patient taking differently: Take 300 mg by mouth 3 times daily Indications: Taking 800 mg daily, 300mg, 200mg, 300mg ) 100 tablet 3    Respiratory Therapy Supplies (NEBULIZER COMPRESSOR) KIT 1 kit by Does not apply route once      isosorbide mononitrate (IMDUR) 30 MG CR tablet Take 1 tablet by mouth daily. (Patient taking differently: Take 30 mg by mouth 2 times daily.) 90 tablet 0    nitroGLYCERIN (NITROSTAT) 0.4 MG SL tablet 1 SL q 5 min prn chest pain. After 3 tabs, if pain persists, go to ER. 25 tablet 0    azithromycin (ZITHROMAX) 500 MG tablet Take 500 mg by mouth. m-w-f      metoprolol (LOPRESSOR) 25 MG tablet Take 50 mg by mouth 2 times daily.  PREDNISONE Take 10 mg by mouth daily at 1800       tiotropium (SPIRIVA) 18 MCG inhalation capsule Inhale 18 mcg into the lungs daily.  montelukast (SINGULAIR) 10 MG tablet Take 10 mg by mouth nightly.  psyllium (METAMUCIL SMOOTH TEXTURE) 28 % packet Take 1 packet by mouth 2 times daily. Take with full glass of h20 or juice       albuterol (PROVENTIL HFA;VENTOLIN HFA) 108 (90 BASE) MCG/ACT inhaler Inhale 2 puffs into the lungs every 6 hours as needed.  Potassium Chloride (KLOR-CON PO) Take 20 mEq by mouth daily       clopidogrel (PLAVIX) 75 MG tablet Take 75 mg by mouth daily.       aspirin 81 MG EC tablet Take 81 mg by mouth daily.  pregabalin (LYRICA) 50 MG capsule Take 1 capsule by mouth daily for 30 days. 90 capsule 0     Facility-Administered Medications Prior to Visit   Medication Dose Route Frequency Provider Last Rate Last Dose    cyanocobalamin injection 1,000 mcg  1,000 mcg Intramuscular Once Dave DO Kristal        triamcinolone acetonide (KENALOG-40) injection 40 mg  40 mg Intramuscular Once Severa Berry, MD           SOCIAL/FAMILY/PAST MEDICAL HISTORY: Ms. Carlo Galindo, family and past medical history was reviewed. REVIEW OF SYSTEMS:    Respiratory: Negative for apnea, chest tightness and shortness of breath or change in baseline breathing. Gastrointestinal: Negative for nausea, vomiting, abdominal pain, diarrhea, constipation, blood in stool and abdominal distention. PHYSICAL EXAM:   Nursing note and vitals reviewed. BP (!) 157/88   Pulse 89   Wt 195 lb (88.5 kg)   BMI 31.47 kg/m²   Constitutional: She appears well-developed and well-nourished. No acute distress. Skin: Skin is warm and dry, good turgor. No rash noted. She is not diaphoretic. Cardiovascular: Normal rate, regular rhythm, normal heart sounds, and does not have murmur. Pulmonary/Chest: Effort normal. No respiratory distress. She does not have wheezes in the lung fields. She has no rales. Neurological/Psychiatric:She is alert and oriented to person, place, and time. Coordination is  normal.  Her mood isAppropriate and affect is Flat/blunted and Anxious . IMPRESSION:   1. Chronic pain syndrome    2. Primary osteoarthritis of right knee    3. Cervical radiculitis    4. Cervical spondylosis without myelopathy    5. Trigeminal neuralgia    6. Degeneration of cervical intervertebral disc        PLAN:  Informed verbal consent was obtained  -Continue with current opioid regimen regards Percocet 2-3 per day   -Adv Biofeedback, relaxation and meditation techniques.  Referral to psychologist for CBT was also discussed with patient   -She was advised to increase fluids ( 5-7  glasses of fluid daily), limit caffeine, avoid cheese products, increase dietary fiber, increase activity and exercise as tolerated and relax regularly and enjoy meals   -Walking/stretching exercises as advised   -Urine drug screen with GC/MS for opiates and drugs of abuse was ordered and will follow up on results. Current Outpatient Medications   Medication Sig Dispense Refill    [START ON 2/22/2020] ALPRAZolam (XANAX) 1 MG tablet TAKE ONE TABLET BY MOUTH TWICE A DAY AS NEEDED FOR ANXIETY. NO ACOHOL. NO DRIVING. Do not take with percocet. 60 tablet 0    levothyroxine (SYNTHROID) 100 MCG tablet TAKE ONE TABLET BY MOUTH DAILY 90 tablet 0    diclofenac sodium (VOLTAREN) 1 % GEL Apply 2-4 grams to right knee TID 5 Tube 1    allopurinol (ZYLOPRIM) 300 MG tablet Take 1 tablet by mouth daily 90 tablet 1    vitamin D (ERGOCALCIFEROL) 85995 units CAPS capsule TAKE ONE CAPSULE BY MOUTH ONCE WEEKLY 12 capsule 3    alendronate (FOSAMAX) 70 MG tablet Take 70 mg by mouth every 7 days      pantoprazole (PROTONIX) 20 MG tablet Take 20 mg by mouth daily      ranolazine (RANEXA) 500 MG extended release tablet Take 500 mg by mouth 2 times daily      atorvastatin (LIPITOR) 80 MG tablet Take 80 mg by mouth daily      amLODIPine (NORVASC) 5 MG tablet Take 2.5 mg by mouth daily       carBAMazepine (TEGRETOL-XR) 100 MG extended release tablet Take 1 tablet by mouth 2 times daily (Patient taking differently: Take 300 mg by mouth 3 times daily Indications: Taking 800 mg daily, 300mg, 200mg, 300mg ) 100 tablet 3    Respiratory Therapy Supplies (NEBULIZER COMPRESSOR) KIT 1 kit by Does not apply route once      isosorbide mononitrate (IMDUR) 30 MG CR tablet Take 1 tablet by mouth daily. (Patient taking differently: Take 30 mg by mouth 2 times daily.) 90 tablet 0    nitroGLYCERIN (NITROSTAT) 0.4 MG SL tablet 1 SL q 5 min prn chest pain.  After 3 obtained. Goals of current treatment regimen include improvement in pain, restoration of functioning- with focus on improvement in physical performance, general activity, work or disability,emotional distress, health care utilization and  decreased medication consumption. Will continue to monitor progress towards achieving/maintaining therapeutic goals with special emphasis on  1. Improvement in perceived interfernce  of pain with ADL's. Ability to do home exercises independently. Ability to do household chores indoor and/or outdoor work and social and leisure activities. Improve psychosocial and physical functioning. - she is showing progression towards this treatment goal with the current regimen. She was advised against drinking alcohol with the narcotic pain medicines, advised against driving or handling machinery while adjusting the dose of medicines or if having cognitive  issues related to the current medications. Risk of overdose and death, if medicines not taken as prescribed, were also discussed. If the patient develops new symptoms or if the symptoms worsen, the patient should call the office. While transcribing every attempt was made to maintain the accuracy of the note in terms of it's contents,there may have been some errors made inadvertently. Thank you for allowing me to participate in the care of this patient.     Shania Dozier MD.    Cc: [address] [place], DO COOK, Chely Chinchilla, am scribing for and in the presence of Dr. Shania Dozier.   01/15/20  4:05 PM  Chely Chinchilla MA     I, Dr. Shania Dozier, personally performed the services described in this documentation as scribed by   Chely Chinchilla MA in my presence and it is both accurate and complete

## 2020-01-30 NOTE — PROGRESS NOTES
1/30/2020    Priscilla Bae is a 81 y.o. female    Chief Complaint   Patient presents with    Discuss Medications     having issues with dizziness       SUBJECTIVE  HPI     Pt is yhaving problems with seeing, and then she feels like she wilol lose her balance. She has fallen twice in the last week, with turning her body position, her symptoms seem to be worse. She complains have her eyes not been able to focus well toward the end of the day and this is coincidently when she has been dizzy then when she stands up. Review of Systems   Constitutional: Negative for chills, fatigue and fever. Respiratory: Negative for shortness of breath. Cardiovascular: Negative for chest pain. Gastrointestinal: Negative for abdominal pain. Musculoskeletal: Negative for myalgias. Neurological: Positive for dizziness. OBJECTIVE  Physical Exam  Vitals signs reviewed. Constitutional:       Appearance: She is well-developed. Comments:        Eyes:      Extraocular Movements: Extraocular movements intact. Conjunctiva/sclera: Conjunctivae normal.      Pupils: Pupils are equal, round, and reactive to light. Neck:      Thyroid: No thyromegaly. Cardiovascular:      Rate and Rhythm: Normal rate. Pulmonary:      Effort: Pulmonary effort is normal.      Breath sounds: Wheezing present. Neurological:      Mental Status: She is alert and oriented to person, place, and time. Psychiatric:         Behavior: Behavior normal.         Allergies  Acetaminophen-codeine and Codeine    Current Outpatient Medications   Medication Sig Dispense Refill    Roflumilast 250 MCG TABS Take 250 mg by mouth Indications: at night      pregabalin (LYRICA) 50 MG capsule Take 1 capsule by mouth daily for 30 days.  90 capsule 0    diclofenac sodium (VOLTAREN) 1 % GEL Apply 2-4 grams to right knee TID 5 Tube 1    oxyCODONE-acetaminophen (PERCOCET) 7.5-325 MG per tablet Take 1 tablet by mouth every 6 hours as needed for Pain for times daily. Take with full glass of h20 or juice       albuterol (PROVENTIL HFA;VENTOLIN HFA) 108 (90 BASE) MCG/ACT inhaler Inhale 2 puffs into the lungs every 6 hours as needed.  Potassium Chloride (KLOR-CON PO) Take 20 mEq by mouth daily       clopidogrel (PLAVIX) 75 MG tablet Take 75 mg by mouth daily.  aspirin 81 MG EC tablet Take 81 mg by mouth daily. Current Facility-Administered Medications   Medication Dose Route Frequency Provider Last Rate Last Dose    cyanocobalamin injection 1,000 mcg  1,000 mcg Intramuscular Once Britney Guajardo DO        triamcinolone acetonide (KENALOG-40) injection 40 mg  40 mg Intramuscular Once Samir Wood MD         Vitals:    01/30/20 1129   BP: 124/72   Temp: 98.1 °F (36.7 °C)      Body mass index is 30.05 kg/m². ASSESSMENT AND PLAN     Diagnosis Orders   1. Dizziness  Comprehensive Metabolic Panel    Hemoglobin A1C    CBC Auto Differential    [facility]    URINALYSIS    URINE CULTURE   2. Other long term (current) drug therapy   Hemoglobin A1C   3. Weakness   URINE CULTURE         New meds this visit:  No orders of the defined types were placed in this encounter. New orders placed this visit:    Orders Placed This Encounter   Procedures    URINE CULTURE     Order Specific Question:   Specify (ex-cath, midstream, cysto, etc)?      Answer:   midstream    Comprehensive Metabolic Panel     Standing Status:   Future     Number of Occurrences:   1     Standing Expiration Date:   1/29/2021    Hemoglobin A1C     Standing Status:   Future     Number of Occurrences:   1     Standing Expiration Date:   1/29/2021    CBC Auto Differential     Standing Status:   Future     Number of Occurrences:   1     Standing Expiration Date:   1/29/2021    URINALYSIS    [facility]     Referral Priority:   Routine     Referral Type:   Eval and Treat     Referral Reason:   Specialty Services Required     Requested times daily.) 90 tablet 0    nitroGLYCERIN (NITROSTAT) 0.4 MG SL tablet 1 SL q 5 min prn chest pain. After 3 tabs, if pain persists, go to ER. 25 tablet 0    azithromycin (ZITHROMAX) 500 MG tablet Take 500 mg by mouth. m-w-f      metoprolol (LOPRESSOR) 25 MG tablet Take 50 mg by mouth 2 times daily.  PREDNISONE Take 10 mg by mouth daily at 1800       tiotropium (SPIRIVA) 18 MCG inhalation capsule Inhale 18 mcg into the lungs daily.  montelukast (SINGULAIR) 10 MG tablet Take 10 mg by mouth nightly.  psyllium (METAMUCIL SMOOTH TEXTURE) 28 % packet Take 1 packet by mouth 2 times daily. Take with full glass of h20 or juice       albuterol (PROVENTIL HFA;VENTOLIN HFA) 108 (90 BASE) MCG/ACT inhaler Inhale 2 puffs into the lungs every 6 hours as needed.  Potassium Chloride (KLOR-CON PO) Take 20 mEq by mouth daily       clopidogrel (PLAVIX) 75 MG tablet Take 75 mg by mouth daily.  aspirin 81 MG EC tablet Take 81 mg by mouth daily.        Facility-Administered Encounter Medications as of 1/30/2020   Medication Dose Route Frequency Provider Last Rate Last Dose    cyanocobalamin injection 1,000 mcg  1,000 mcg Intramuscular Once Yanet Petersen DO        triamcinolone acetonide (KENALOG-40) injection 40 mg  40 mg Intramuscular Once MD Yanet Fernando D.O.

## 2020-02-06 NOTE — TELEPHONE ENCOUNTER
Jessica Oden, please put another basic metabolic panel order in the computer and call Mrs. Cutlerrey Leanne to tell her that the nephrology office wants us to repeat her labs and then will make a decision about how quickly she needs to get in.

## 2020-02-06 NOTE — TELEPHONE ENCOUNTER
Spoke with pt\"s daughter Christian Kolb on HIPAA she will take her to have lab done tomorrow afternoon

## 2020-04-07 NOTE — PROGRESS NOTES
Subjective:      Patient ID: Mahesh Stokes is a 81 y.o. female. Patient presents with:  Check-Up: thyroid, lipids     Here for the above  Former patient dr Ayo Ghotra  She needs refill of the xanax and this was done yesterday  She quit tobacco 30 years ago  She uses walker to help her steady self  trigeminal neuralgia stable for 3 years and remains on tegretol  No sores on the feet no meds for the dm  Advised to keep the meds to a minimal use  Taking fluids  Needs allopurinol sheree  Consider PT in future    Her daughter Estelle Cardoso present      YOB: 1935    Date of Visit:  4/7/2020     -- Acetaminophen-Codeine -- Nausea Only   -- Codeine     Current Outpatient Medications:  ALPRAZolam (XANAX) 1 MG tablet, TAKE ONE TABLET BY MOUTH TWICE A DAY AS NEEDED FOR ANXIETY. NO ACOHOL. NO DRIVING.  Do not take with percocet., Disp: 60 tablet, Rfl: 0  levothyroxine (SYNTHROID) 100 MCG tablet, TAKE ONE TABLET BY MOUTH DAILY, Disp: 90 tablet, Rfl: 1  Roflumilast 250 MCG TABS, Take 250 mg by mouth Indications: at night, Disp: , Rfl:   diclofenac sodium (VOLTAREN) 1 % GEL, Apply 2-4 grams to right knee TID, Disp: 5 Tube, Rfl: 1  allopurinol (ZYLOPRIM) 300 MG tablet, Take 1 tablet by mouth daily, Disp: 90 tablet, Rfl: 1  vitamin D (ERGOCALCIFEROL) 37908 units CAPS capsule, TAKE ONE CAPSULE BY MOUTH ONCE WEEKLY, Disp: 12 capsule, Rfl: 3  alendronate (FOSAMAX) 70 MG tablet, Take 70 mg by mouth every 7 days, Disp: , Rfl:   pantoprazole (PROTONIX) 20 MG tablet, Take 20 mg by mouth daily, Disp: , Rfl:   ranolazine (RANEXA) 500 MG extended release tablet, Take 500 mg by mouth 2 times daily Indications: 1 am 2 pm , Disp: , Rfl:   atorvastatin (LIPITOR) 80 MG tablet, Take 80 mg by mouth daily, Disp: , Rfl:   amLODIPine (NORVASC) 5 MG tablet, Take 2.5 mg by mouth daily , Disp: , Rfl:    carBAMazepine (TEGRETOL-XR) 100 MG extended release tablet, Take 1 tablet by mouth 2 times daily (Patient taking differently: Take 300 mg kg)    BP Readings from Last 3 Encounters:  01/30/20 : 124/72  01/15/20 : (!) 157/88  12/05/19 : 110/68        Review of Systems   Constitutional: Negative for appetite change, chills, fever and unexpected weight change. Eyes: Negative for pain and visual disturbance. Respiratory: Negative for cough, chest tightness and shortness of breath. She has some cochran    Cardiovascular: Negative for palpitations and leg swelling. \"I have bad heart and will get cp\" comes and goes with or with out exertion for a number of years. See's heart doctor and they are aware, ntg takes care of the sx. she will get cochran at times. Gastrointestinal: Negative for abdominal distention, abdominal pain, blood in stool, constipation, diarrhea, nausea and vomiting. No gerd no dysphagia   Endocrine: Negative for polydipsia and polyuria. Genitourinary: Negative for difficulty urinating, dysuria and hematuria. Musculoskeletal:        No sores on the feet   Skin: Negative for rash. Neurological: Positive for dizziness. Negative for syncope and headaches. She has had months of dizzy (6 month). When up  It may be worse but can get at rest  She saw eye doc recently and all ok. Comes and goes with or without movent  fall about a month ago when she got dizzy   Hematological: Does not bruise/bleed easily. Objective:   Physical Exam  Constitutional:       General: She is not in acute distress. Appearance: Normal appearance. She is not ill-appearing. Comments: She appears well    Neurological:      Mental Status: She is alert. Psychiatric:         Attention and Perception: Attention normal.         Speech: Speech normal.         Behavior: Behavior normal.      Comments: She appeared to have normal affect and not agitated   She was alert and no confusion         Assessment:        Diagnosis Orders   1. Type 2 diabetes mellitus without complication, without long-term current use of insulin ([address]     2. Dizzy     3. Essential hypertension     4. Anxiety           Stable   Has not had any gout flares  Orders Placed This Encounter   Medications    allopurinol (ZYLOPRIM) 300 MG tablet     Sig: Take 1 tablet by mouth daily     Dispense:  90 tablet     Refill:  1     Visit done via synchronous video      Plan:       Will plan on visit and blood work in June   She is advised to stay on the medications  She is also advised to consider PT during June or July to help with steady  Advised to use the walker  Her daughter in agreement  See in the summer        Nestor Patterson MD

## 2020-04-15 NOTE — PROGRESS NOTES
TELE HEALTH VISIT (AUDIO-VISUAL)    Pursuant to the emergency declaration under the [facility], [facility] waiver authority and the [employer] and [employer] Act, this Virtual  Visit was conducted, with patient's/legal guardian's consent, to reduce the patient's risk of exposure to COVID-19 and provide continuity of care for an established patient. Service is  provided through a video synchronous discussion virtually to substitute for in-person clinic visit. Due to this being a TeleHealth encounter (During [facility]-18 public health emergency), evaluation of the following organ systems was limited: Vitals/Constitutional/EENT/Resp/CV/GI//MS/Neuro/Skin/Czcu-Zowf-Xsg. [facility]  1935  [id number]    Ms. Pizarro is being seen virtually for a follow up visit using one of the following techniques-Doxy. me/[employer] Video visit/[employer] Du[employer] or Face time. Informed verbal consent to the virtual visit was obtained from Ms. Pizarro. Risks associated with HIPPA compliance with the virtual visit was explained to the patient. Ms. Pizarro is at her residence and Dr. Edgar Cruz is in his office. HISTORY OF PRESENT ILLNESS:  Ms. Pizarro is a 81 y.o. female  being assessed for a follow up visit for pain management for evaluation of ongoing care regarding her symptoms and monitoring of compliance with long term use high risk medications. She has a diagnosis of   1. Chronic pain syndrome    2. Primary osteoarthritis of right knee    3. Cervical radiculitis    4. Cervical spondylosis without myelopathy    5. Trigeminal neuralgia    6. Degeneration of cervical intervertebral disc    . She complains of pain in the neck, upper back  with radiation to the shoulders Bilateral . She rates the pain 6/10 and describes it as aching, throbbing. Current treatment regimen has helped relieve about 50% of the pain.   She denies any side effects from the current baseline breathing. Gastrointestinal: Negative for nausea, vomiting, abdominal pain, diarrhea, constipation, blood in stool and abdominal distention. PHYSICAL EXAM:   Nursing note and vitals per patient reviewed. There were no vitals taken for this visit. Constitutional: She appears well-developed and well-nourished. No acute distress. No respiratory distress. Skin: Skin appears to be warm and dry. No rashes or any other marks noted. She is not diaphoretic. Neurological/Psychiatric:She is alert and oriented to person, place, and time. Coordination is  normal.  Her mood isAppropriate and affect is Neutral/Euthymic(normal). Musculoskeletal / Extremities: Range of motion is normal. Gait is normal, assistive devices use: none. IMPRESSION:   1. Chronic pain syndrome    2. Primary osteoarthritis of right knee    3. Cervical radiculitis    4. Cervical spondylosis without myelopathy    5. Trigeminal neuralgia    6. Degeneration of cervical intervertebral disc        PLAN:  Informed verbal consent regarding treatment was obtained  -Urine drug screen with GC/MS confirmation for opiates and drugs of abuse was reviewed and the results are negative for drugs of abuse and the prescribed medications were absent. She is using Oxycodone PRN only  -She was advised to increase fluids ( 5-7  glasses of fluid daily), limit caffeine, avoid cheese products, increase dietary fiber, increase activity and exercise as tolerated and relax regularly and enjoy meals   -walking/stretching exercises as advised        Current Outpatient Medications   Medication Sig Dispense Refill    allopurinol (ZYLOPRIM) 300 MG tablet Take 1 tablet by mouth daily 90 tablet 1    ALPRAZolam (XANAX) 1 MG tablet TAKE ONE TABLET BY MOUTH TWICE A DAY AS NEEDED FOR ANXIETY. NO ACOHOL. NO DRIVING. Do not take with percocet.  60 tablet 0    levothyroxine (SYNTHROID) 100 MCG tablet TAKE ONE TABLET BY MOUTH DAILY 90 tablet 1    Roflumilast 250 MCG TABS Take 250 mg by mouth Indications: at night      diclofenac sodium (VOLTAREN) 1 % GEL Apply 2-4 grams to right knee TID 5 Tube 1    vitamin D (ERGOCALCIFEROL) 39697 units CAPS capsule TAKE ONE CAPSULE BY MOUTH ONCE WEEKLY 12 capsule 3    alendronate (FOSAMAX) 70 MG tablet Take 70 mg by mouth every 7 days      pantoprazole (PROTONIX) 20 MG tablet Take 20 mg by mouth daily      ranolazine (RANEXA) 500 MG extended release tablet Take 500 mg by mouth 2 times daily Indications: 1 am 2 pm       atorvastatin (LIPITOR) 80 MG tablet Take 80 mg by mouth daily      amLODIPine (NORVASC) 5 MG tablet Take 2.5 mg by mouth daily       carBAMazepine (TEGRETOL-XR) 100 MG extended release tablet Take 1 tablet by mouth 2 times daily (Patient taking differently: Take 300 mg by mouth 3 times daily Indications: Taking 800 mg daily, 300mg, 200mg, 300mg ) 100 tablet 3    Respiratory Therapy Supplies (NEBULIZER COMPRESSOR) KIT 1 kit by Does not apply route once      isosorbide mononitrate (IMDUR) 30 MG CR tablet Take 1 tablet by mouth daily. (Patient taking differently: Take 30 mg by mouth 2 times daily.) 90 tablet 0    nitroGLYCERIN (NITROSTAT) 0.4 MG SL tablet 1 SL q 5 min prn chest pain. After 3 tabs, if pain persists, go to ER. 25 tablet 0    metoprolol (LOPRESSOR) 25 MG tablet Take 50 mg by mouth 2 times daily.  PREDNISONE Take 10 mg by mouth daily at 1800       tiotropium (SPIRIVA) 18 MCG inhalation capsule Inhale 18 mcg into the lungs daily.  montelukast (SINGULAIR) 10 MG tablet Take 10 mg by mouth nightly.  psyllium (METAMUCIL SMOOTH TEXTURE) 28 % packet Take 1 packet by mouth 2 times daily. Take with full glass of h20 or juice       albuterol (PROVENTIL HFA;VENTOLIN HFA) 108 (90 BASE) MCG/ACT inhaler Inhale 2 puffs into the lungs every 6 hours as needed.  Potassium Chloride (KLOR-CON PO) Take 20 mEq by mouth daily       clopidogrel (PLAVIX) 75 MG tablet Take 75 mg by mouth daily.       aspirin 81 MG

## 2020-06-05 NOTE — PROGRESS NOTES
1 tablet by mouth daily. (Patient taking differently: Take 30 mg by mouth 2 times daily.), Disp: 90 tablet, Rfl: 0  nitroGLYCERIN (NITROSTAT) 0.4 MG SL tablet, 1 SL q 5 min prn chest pain. After 3 tabs, if pain persists, go to ER., Disp: 25 tablet, Rfl: 0  metoprolol (LOPRESSOR) 25 MG tablet, Take 50 mg by mouth 2 times daily. , Disp: , Rfl:   PREDNISONE, Take 10 mg by mouth daily at 1800 , Disp: , Rfl:   tiotropium (SPIRIVA) 18 MCG inhalation capsule, Inhale 18 mcg into the lungs daily. , Disp: , Rfl:   montelukast (SINGULAIR) 10 MG tablet, Take 10 mg by mouth nightly., Disp: , Rfl:   psyllium (METAMUCIL SMOOTH TEXTURE) 28 % packet, Take 1 packet by mouth 2 times daily. Take with full glass of h20 or juice , Disp: , Rfl:   albuterol (PROVENTIL HFA;VENTOLIN HFA) 108 (90 BASE) MCG/ACT inhaler, Inhale 2 puffs into the lungs every 6 hours as needed. , Disp: , Rfl:   Potassium Chloride (KLOR-CON PO), Take 20 mEq by mouth daily , Disp: , Rfl:    clopidogrel (PLAVIX) 75 MG tablet, Take 75 mg by mouth daily. , Disp: , Rfl:    aspirin 81 MG EC tablet, Take 81 mg by mouth daily. , Disp: , Rfl:   ALPRAZolam (XANAX) 1 MG tablet, TAKE ONE TABLET BY MOUTH TWICE A DAY AS NEEDED FOR ANXIETY. NO ACOHOL. NO DRIVING. Do not take with percocet., Disp: 60 tablet, Rfl: 0  pregabalin (LYRICA) 50 MG capsule, Take 1 capsule by mouth daily for 30 days. , Disp: 90 capsule, Rfl: 0    Current Facility-Administered Medications:  cyanocobalamin injection 1,000 mcg, 1,000 mcg, Intramuscular, Once, Rayne Prieto DO  triamcinolone acetonide (KENALOG-40) injection 40 mg, 40 mg, Intramuscular, Once, Joy Mathur MD        There were no vitals filed for this visit. There is no height or weight on file to calculate BMI.      Wt Readings from Last 3 Encounters:  01/30/20 : 193 lb (87.5 kg)  01/15/20 : 195 lb (88.5 kg)  12/05/19 : 195 lb 12.8 oz (88.8 kg)    BP Readings from Last 3 Encounters:  01/30/20 : 124/72  01/15/20 : (!) 157/88  12/05/19 :

## 2020-06-10 NOTE — PROGRESS NOTES
TELE HEALTH VISIT (AUDIO-VISUAL)    Pursuant to the emergency declaration under the [facility], [facility], [facility] waiver authority and the [employer] and [employer] Act, this Virtual  Visit was conducted, with patient's/legal guardian's consent, to reduce the patient's risk of exposure to COVID-19 and provide continuity of care for an established patient. Service is  provided through a video synchronous discussion virtually to substitute for in-person clinic visit. Due to this being a TeleHealth encounter (During [facility]-52 public health emergency), evaluation of the following organ systems was limited: Vitals/Constitutional/EENT/Resp/CV/GI//MS/Neuro/Skin/Dyms-Dopo-Mwd. Min White  1935  [id number]    Ms. Talbert Spatz is being seen virtually for a follow up visit using one of the following techniques  Doxy. me  Informed verbal consent to the virtual visit was obtained from Ms. Talbert Spatz. Risks associated with HIPPA compliance with the virtual visit was explained to the patient. Ms. Talbert Spatz is at her residence and Dr. Jeoffrey Cheadle is in his office. HISTORY OF PRESENT ILLNESS:  Ms. Talbert Spatz is a 81 y.o. female  being assessed for a follow up visit for pain management for evaluation of ongoing care regarding her symptoms and monitoring of compliance with long term use high risk medications. She has a diagnosis of   1. Chronic pain syndrome    2. Primary osteoarthritis of right knee    3. Cervical radiculitis    4. Cervical spondylosis without myelopathy    5. Trigeminal neuralgia    6. Degeneration of cervical intervertebral disc    . She complains of pain in the neck  with radiation to the upper back . She rates the pain 7/10 and describes it as aching. Current treatment regimen has helped relieve about 30% of the pain. She denies any side effects from the current pain regimen.  Patient reports that since the last follow up visit the physical functioning is unchanged, family/social relationships are unchanged, mood is unchanged sleep patterns are unchanged, and that the overall functioning is unchanged. Patient denies misusing/abusing her narcotic pain medications or using any illegal drugs. There are No indicators for possible drug abuse, addiction or diversion problems. Ms. Talbert Spatz states she has been really dizzy, denies URI symptoms. She says she is on BP medications but has not been monitoring it. She mentions she is using Percocet 1-2 per day along with Xanax 1/2 tablet at night from her PCP also. She says she is not using much Tylenol, she is using PRN only. ALLERGIES: Patients list of allergies were reviewed     MEDICATIONS: Ms. Talbert Spatz list of medications were reviewed. Her current medications are   Outpatient Medications Prior to Visit   Medication Sig Dispense Refill    ALPRAZolam (XANAX) 1 MG tablet TAKE ONE TABLET BY MOUTH TWICE A DAY AS NEEDED FOR ANXIETY. NO ACOHOL. NO DRIVING. Do not take with percocet. 60 tablet 1    oxyCODONE-acetaminophen (PERCOCET) 7.5-325 MG per tablet Take 1 tablet by mouth every 6 hours as needed for Pain for up to 56 days.  Max 2-3 PER DAY 90 tablet 0    allopurinol (ZYLOPRIM) 300 MG tablet Take 1 tablet by mouth daily 90 tablet 1    levothyroxine (SYNTHROID) 100 MCG tablet TAKE ONE TABLET BY MOUTH DAILY 90 tablet 1    Roflumilast 250 MCG TABS Take 250 mg by mouth Indications: at night      diclofenac sodium (VOLTAREN) 1 % GEL Apply 2-4 grams to right knee TID 5 Tube 1    vitamin D (ERGOCALCIFEROL) 11208 units CAPS capsule TAKE ONE CAPSULE BY MOUTH ONCE WEEKLY 12 capsule 3    alendronate (FOSAMAX) 70 MG tablet Take 70 mg by mouth every 7 days      pantoprazole (PROTONIX) 20 MG tablet Take 20 mg by mouth daily      ranolazine (RANEXA) 500 MG extended release tablet Take 500 mg by mouth 2 times daily Indications: 1 am 2 pm       atorvastatin (LIPITOR) 80 MG tablet Take 80 mg by mouth daily  amLODIPine (NORVASC) 5 MG tablet Take 2.5 mg by mouth daily       carBAMazepine (TEGRETOL-XR) 100 MG extended release tablet Take 1 tablet by mouth 2 times daily (Patient taking differently: Take 300 mg by mouth 3 times daily Indications: Taking 800 mg daily, 300mg, 200mg, 300mg ) 100 tablet 3    Respiratory Therapy Supplies (NEBULIZER COMPRESSOR) KIT 1 kit by Does not apply route once      isosorbide mononitrate (IMDUR) 30 MG CR tablet Take 1 tablet by mouth daily. (Patient taking differently: Take 30 mg by mouth 2 times daily.) 90 tablet 0    nitroGLYCERIN (NITROSTAT) 0.4 MG SL tablet 1 SL q 5 min prn chest pain. After 3 tabs, if pain persists, go to ER. 25 tablet 0    metoprolol (LOPRESSOR) 25 MG tablet Take 50 mg by mouth 2 times daily.  PREDNISONE Take 10 mg by mouth daily at 1800       tiotropium (SPIRIVA) 18 MCG inhalation capsule Inhale 18 mcg into the lungs daily.  montelukast (SINGULAIR) 10 MG tablet Take 10 mg by mouth nightly.  psyllium (METAMUCIL SMOOTH TEXTURE) 28 % packet Take 1 packet by mouth 2 times daily. Take with full glass of h20 or juice       albuterol (PROVENTIL HFA;VENTOLIN HFA) 108 (90 BASE) MCG/ACT inhaler Inhale 2 puffs into the lungs every 6 hours as needed.  Potassium Chloride (KLOR-CON PO) Take 20 mEq by mouth daily       clopidogrel (PLAVIX) 75 MG tablet Take 75 mg by mouth daily.  aspirin 81 MG EC tablet Take 81 mg by mouth daily.  pregabalin (LYRICA) 50 MG capsule Take 1 capsule by mouth daily for 30 days.  90 capsule 0     Facility-Administered Medications Prior to Visit   Medication Dose Route Frequency Provider Last Rate Last Dose    cyanocobalamin injection 1,000 mcg  1,000 mcg Intramuscular Once Danny Heath DO        triamcinolone acetonide (KENALOG-40) injection 40 mg  40 mg Intramuscular Once Phani Rojas MD           SOCIAL/FAMILY/PAST MEDICAL HISTORY: Ms. Darcy Robles, family and past medical history was not take with percocet. 60 tablet 1    oxyCODONE-acetaminophen (PERCOCET) 7.5-325 MG per tablet Take 1 tablet by mouth every 6 hours as needed for Pain for up to 56 days. Max 2-3 PER DAY 90 tablet 0    allopurinol (ZYLOPRIM) 300 MG tablet Take 1 tablet by mouth daily 90 tablet 1    levothyroxine (SYNTHROID) 100 MCG tablet TAKE ONE TABLET BY MOUTH DAILY 90 tablet 1    Roflumilast 250 MCG TABS Take 250 mg by mouth Indications: at night      diclofenac sodium (VOLTAREN) 1 % GEL Apply 2-4 grams to right knee TID 5 Tube 1    vitamin D (ERGOCALCIFEROL) 71023 units CAPS capsule TAKE ONE CAPSULE BY MOUTH ONCE WEEKLY 12 capsule 3    alendronate (FOSAMAX) 70 MG tablet Take 70 mg by mouth every 7 days      pantoprazole (PROTONIX) 20 MG tablet Take 20 mg by mouth daily      ranolazine (RANEXA) 500 MG extended release tablet Take 500 mg by mouth 2 times daily Indications: 1 am 2 pm       atorvastatin (LIPITOR) 80 MG tablet Take 80 mg by mouth daily      amLODIPine (NORVASC) 5 MG tablet Take 2.5 mg by mouth daily       carBAMazepine (TEGRETOL-XR) 100 MG extended release tablet Take 1 tablet by mouth 2 times daily (Patient taking differently: Take 300 mg by mouth 3 times daily Indications: Taking 800 mg daily, 300mg, 200mg, 300mg ) 100 tablet 3    Respiratory Therapy Supplies (NEBULIZER COMPRESSOR) KIT 1 kit by Does not apply route once      isosorbide mononitrate (IMDUR) 30 MG CR tablet Take 1 tablet by mouth daily. (Patient taking differently: Take 30 mg by mouth 2 times daily.) 90 tablet 0    nitroGLYCERIN (NITROSTAT) 0.4 MG SL tablet 1 SL q 5 min prn chest pain. After 3 tabs, if pain persists, go to ER. 25 tablet 0    metoprolol (LOPRESSOR) 25 MG tablet Take 50 mg by mouth 2 times daily.  PREDNISONE Take 10 mg by mouth daily at 1800       tiotropium (SPIRIVA) 18 MCG inhalation capsule Inhale 18 mcg into the lungs daily.  montelukast (SINGULAIR) 10 MG tablet Take 10 mg by mouth nightly.       psyllium (METAMUCIL SMOOTH TEXTURE) 28 % packet Take 1 packet by mouth 2 times daily. Take with full glass of h20 or juice       albuterol (PROVENTIL HFA;VENTOLIN HFA) 108 (90 BASE) MCG/ACT inhaler Inhale 2 puffs into the lungs every 6 hours as needed.  Potassium Chloride (KLOR-CON PO) Take 20 mEq by mouth daily       clopidogrel (PLAVIX) 75 MG tablet Take 75 mg by mouth daily.  aspirin 81 MG EC tablet Take 81 mg by mouth daily.  pregabalin (LYRICA) 50 MG capsule Take 1 capsule by mouth daily for 30 days. 90 capsule 0     Current Facility-Administered Medications   Medication Dose Route Frequency Provider Last Rate Last Dose    cyanocobalamin injection 1,000 mcg  1,000 mcg Intramuscular Once Aleja Vizcaino DO        triamcinolone acetonide (KENALOG-40) injection 40 mg  40 mg Intramuscular Once Pawan Johnson MD         I will continue her current medication regimen  which is part of the above treatment schedule. It has been helping with Ms. Inman's chronic  medical problems which for this visit include:   Diagnoses of Chronic pain syndrome, Primary osteoarthritis of right knee, Cervical radiculitis, Cervical spondylosis without myelopathy, Trigeminal neuralgia, and Degeneration of cervical intervertebral disc were pertinent to this visit. Risks and benefits of the medications and other alternative treatments  including no treatment were discussed with the patient. The common side effects of these medications were also explained to the patient. Informed verbal consent was obtained. Goals of current treatment regimen include improvement in pain, restoration of functioning- with focus on improvement in physical performance, general activity, work or disability,emotional distress, health care utilization and  decreased medication consumption. Will continue to monitor progress towards achieving/maintaining therapeutic goals with special emphasis on  1.  Improvement in perceived interfernce  of

## 2020-08-01 NOTE — ED PROVIDER NOTES
[facility]  eMERGENCY dEPARTMENT eNCOUnter        Pt Name: Mag Gant  MRN: [id number]  [place] 1935  Date of evaluation: 8/1/2020  Provider: Deysi Dominguez MD  PCP: Vik Abad MD      [address] [place] [address]       Chief Complaint   Patient presents with    Epistaxis     off on since 12 noon       HISTORY OFPRESENT ILLNESS   (Location/Symptom, Timing/Onset, Context/Setting, Quality, Duration, Modifying Factors,Severity)  Note limiting factors. Mag Gant is a 81 y.o. female       Location/Symptom: Nosebleed  Timing/Onset: 12 noon  Context/Setting: Left-sided with some blood going down the back of her throat. She has had problems with this in the past.  She has had to have packing in the past.  She has tried some Afrin and also some saline gel without success although throughout the day would come and go. She is on Plavix and aspirin. Quality: Left-sided  Duration: 12 hours  Modifying Factors: Minimal improvement with Afrin    Nursing Noteswere all reviewed and agreed with or any disagreements were addressed  in the HPI. REVIEW OF SYSTEMS    (2-9 systems for level 4, 10 or more for level 5)     Review of Systems   Constitutional: Negative for chills, fatigue and fever. HENT: Positive for nosebleeds. Negative for ear pain, rhinorrhea and sore throat. Eyes: Negative for pain, discharge and visual disturbance. Respiratory: Negative for cough, shortness of breath and wheezing. Cardiovascular: Negative for chest pain, palpitations and leg swelling. Gastrointestinal: Negative for abdominal pain, diarrhea, nausea and vomiting. Genitourinary: Negative for difficulty urinating, dysuria, pelvic pain and vaginal discharge. Musculoskeletal: Negative for arthralgias, back pain, joint swelling and neck pain. Skin: Negative for rash. Allergic/Immunologic: Negative for environmental allergies.    Neurological: Negative for dizziness, seizures, drinks    Drug use: No    Sexual activity: Not on file   Lifestyle    Physical activity     Days per week: Not on file     Minutes per session: Not on file    Stress: Not on file   Relationships    Social connections     Talks on phone: Not on file     Gets together: Not on file     Attends [employer] service: Not on file     Active member of club or organization: Not on file     Attends meetings of clubs or organizations: Not on file     Relationship status: Not on file    Intimate partner violence     Fear of current or ex partner: Not on file     Emotionally abused: Not on file     Physically abused: Not on file     Forced sexual activity: Not on file   Other Topics Concern    Not on file   Social History Narrative    Not on file       SCREENINGS             PHYSICAL EXAM    (up to 7 for level 4, 8 or more for level 5)     ED Triage Vitals [08/01/20 0033]   BP Temp Temp Source Pulse Resp SpO2 Height Weight   (!) 169/90 99.1 °F (37.3 °C) Oral 99 16 94 % 5' 6\" (1.676 m) 189 lb 9 oz (86 kg)      height is 5' 6\" (1.676 m) and weight is 189 lb 9 oz (86 kg). Her oral temperature is 99.1 °F (37.3 °C). Her blood pressure is 169/90 (abnormal) and her pulse is 99. Her respiration is 16 and oxygen saturation is 94%. Physical Exam  Vitals signs and nursing note reviewed. Constitutional:       Appearance: She is well-developed. She is not diaphoretic. HENT:      Head: Normocephalic and atraumatic. Right Ear: External ear normal.      Left Ear: External ear normal.      Nose: Mucosal edema present. Right Nostril: No foreign body, epistaxis or occlusion. Left Nostril: Epistaxis present. No foreign body or occlusion. Comments: She does have some red blood on the left side of the posterior oropharynx. Eyes:      General: No scleral icterus. Right eye: No discharge. Left eye: No discharge.       Conjunctiva/sclera: Conjunctivae normal.   Neck:      Musculoskeletal: Normal range of motion. Trachea: No tracheal deviation. Pulmonary:      Effort: Pulmonary effort is normal. No respiratory distress. Breath sounds: No stridor. Musculoskeletal: Normal range of motion. Skin:     General: Skin is warm and dry. Neurological:      Mental Status: She is alert and oriented to person, place, and time. Coordination: Coordination normal.   Psychiatric:         Behavior: Behavior normal.         DIAGNOSTIC RESULTS   LABS:    No results found for this visit on 08/01/20. All other labs were within normal range or not returned as of this dictation. EKG: All EKG's are interpreted by the Emergency Department Physician who either signs orCo-signs this chart in the absence of a cardiologist.    None    RADIOLOGY:   Non-plain film images such as CT, Ultrasound and MRI are read by the radiologist. Velasquez Suh radiographic images are visualized and preliminarily interpreted by the  EDProvider with the below findings:    None        PROCEDURES   Unless otherwise noted below, none     Epistaxis Mgmt    Date/Time: 8/1/2020 1:00 AM  Performed by: Dain Jara MD  Authorized by: Dain Jara MD     Consent:     Consent obtained:  Written and verbal    Consent given by:  Patient  Anesthesia (see MAR for exact dosages): Anesthesia method:  Topical application    Topical anesthesia: Lidocaine with epinephrine 2%  Procedure details:     Treatment site:  L anterior    Treatment method:  Silver nitrate and nasal balloon    Treatment complexity:  Extensive    Treatment episode: initial    Post-procedure details:     Assessment:  Bleeding decreased    Patient tolerance of procedure: Tolerated well, no immediate complications  Comments:      I applied lidocaine with epinephrine and cleaned out the nares. Identified an area in the anterior Kiesselbach plexus so I cauterized that. I watched the patient for about 30 minutes.   She still had a clot hanging down from the nasopharynx but there was

## 2020-08-11 NOTE — PROGRESS NOTES
Subjective:      Patient ID: Re Gaston is a 81 y.o. female. Patient presents with:  Pre-op Exam: Nose Surgery on 8- by Dr. Freda Frankel @ [facility].  Medication Refill: Xanax    She is to have procedure for the left side epistaxis recurrent  Feeling well but some dizzy at times   Needs refill of xanax (chronic)   She had preop labs and covid test done yesterday at [facility]   Cardiology and pulmonary has given the ok for the procedure     Allergies:   -- Acetaminophen-Codeine -- Nausea Only   -- Codeine      height is 5' 6\" (1.676 m) and weight is 187 lb (84.8 kg). Her temporal temperature is 97.1 °F (36.2 °C). Her blood pressure is 122/70 and her pulse is 88. No tobacco since 1982. No ETOH use. Immunization History  Administered            Date(s) Administered    Influenza Virus Vaccine                          10/01/2014      Influenza, High Dose (Fluzone 65 yrs and older)                          09/24/2015  10/14/2016  09/28/2017                            09/28/2018      Influenza, Triv, inactivated, subunit, adjuvanted, IM (Fluad 65 yrs and older)                          10/08/2019      Pneumococcal Conjugate 13-valent ([id number])                          05/01/2015      Pneumococcal Polysaccharide ([id number])                          08/19/2014      Td, unspecified formulation                          08/19/2014      Zoster Live (Zostavax)                          01/01/2013          Current Outpatient Medications:     carBAMazepine (TEGRETOL XR) 200 MG extended release tablet, Take 2 tablets by mouth 2 times daily,     isosorbide mononitrate (IMDUR) 60 MG extended release tablet, Take 1 tablet by mouth daily    metoprolol tartrate (LOPRESSOR) 50 MG tablet, Take 0.5 tablets by mouth 2 times daily    furosemide (LASIX) 40 MG tablet, Take 40 mg by mouth daily    ALPRAZolam (XANAX) 1 MG tablet, Take 1 tablet by mouth 2 times daily as needed for Sleep or Anxiety for up to 60 days.  TAKE ONE TABLET BY MOUTH TWICE A DAY AS NEEDED FOR ANXIETY. NO ACOHOL. NO DRIVING. Do not take with percocet.   vitamin D (ERGOCALCIFEROL) 1.25 MG (32408 UT) CAPS capsule, Take 1 capsule by mouth once a week    potassium chloride (KLOR-CON M) 20 MEQ extended release tablet, Take 1.5 tablets by mouth daily     allopurinol (ZYLOPRIM) 300 MG tablet, Take 1 tablet by mouth daily    levothyroxine (SYNTHROID) 100 MCG tablet, TAKE ONE TABLET BY MOUTH DAILY    Roflumilast 250 MCG TABS, Take 250 mg by mouth Indications: at night    diclofenac sodium (VOLTAREN) 1 % GEL, Apply 2-4 grams to right knee TID    pantoprazole (PROTONIX) 20 MG tablet, Take 20 mg by mouth daily    ranolazine (RANEXA) 500 MG extended release tablet, Take 500 mg by mouth 2 times daily Indications:     atorvastatin (LIPITOR) 80 MG tablet, Take 80 mg by mouth daily     amLODIPine (NORVASC) 5 MG tablet, Take 2.5 mg by mouth daily    Respiratory Therapy Supplies (NEBULIZER COMPRESSOR) KIT, 1 kit by Does not apply route once      nitroGLYCERIN (NITROSTAT) 0.4 MG SL tablet, 1 SL q 5 min prn chest pain. After 3 tabs, if pain persists, go to ER.   PREDNISONE, Take 10 mg by mouth daily at 1800     tiotropium (SPIRIVA) 18 MCG inhalation capsule, Inhale 18 mcg into the lungs daily.   montelukast (SINGULAIR) 10 MG tablet, Take 10 mg by mouth nightly.   psyllium (METAMUCIL SMOOTH TEXTURE) 28 % packet, Take 1 packet by mouth 2 times daily. Take with full glass of h20 or juice    albuterol (PROVENTIL HFA;VENTOLIN HFA) 108 (90 BASE) MCG/ACT inhaler, Inhale 2 puffs into the lungs every 6 hours as needed.   clopidogrel (PLAVIX) 75 MG tablet, Take 75 mg by mouth daily.   aspirin 81 MG EC tablet, Take 81 mg by mouth daily.     oxyCODONE-acetaminophen (PERCOCET) 7.5-325 MG per tablet, Take 1 tablet by mouth every 6 hours as needed for Pain (max 1-2 per day) for up to 56 days    pregabalin (LYRICA) 50 MG capsule, Take 1 capsule by mouth daily for 30 Pulmonary effort is normal. No tachypnea, accessory muscle usage or respiratory distress. Breath sounds: Wheezing present. No decreased breath sounds, rhonchi or rales. Comments: few insp wheezes noted in posterior fields  Abdominal:      General: Bowel sounds are normal. There is no distension or abdominal bruit. Palpations: Abdomen is soft. There is no hepatomegaly, splenomegaly, mass or pulsatile mass. Tenderness: There is no abdominal tenderness. There is no guarding. Lymphadenopathy:      Head:      Right side of head: No submental, submandibular, preauricular or posterior auricular adenopathy. Left side of head: No submental, submandibular, preauricular or posterior auricular adenopathy. Cervical: No cervical adenopathy. Upper Body:      Right upper body: No supraclavicular adenopathy. Left upper body: No supraclavicular adenopathy. Skin:     General: Skin is warm and dry. Coloration: Skin is not pale. Nails: There is no clubbing. Neurological:      General: No focal deficit present. Mental Status: She is alert. Psychiatric:         Attention and Perception: Attention normal.         Mood and Affect: Mood normal.         Speech: Speech normal.         Behavior: Behavior normal.         Assessment:        Diagnosis Orders   1. Preop examination     2. Epistaxis     3. Uncontrolled type 2 diabetes mellitus without complication, without long-term current use of insulin ([address]     4. Essential hypertension     5. Primary insomnia  ALPRAZolam (XANAX) 1 MG tablet   6. Anxiety  ALPRAZolam (XANAX) 1 MG tablet       Hg 10.4 on 8/10/20; protime ok ; calcium slight low at 8.1 and her k was at 3.5 yesterday as well      Ok for surgery. Potassium adjusted    Orders Placed This Encounter   Medications    ALPRAZolam (XANAX) 1 MG tablet     Sig: Take 1 tablet by mouth 2 times daily as needed for Sleep or Anxiety for up to 60 days.  TAKE ONE TABLET BY MOUTH TWICE A DAY AS NEEDED FOR ANXIETY. NO ACOHOL. NO DRIVING. Do not take with percocet. Dispense:  60 tablet     Refill:  1    vitamin D (ERGOCALCIFEROL) 1.25 MG (39797 UT) CAPS capsule     Sig: Take 1 capsule by mouth once a week     Dispense:  12 capsule     Refill:  1    potassium chloride (KLOR-CON M) 20 MEQ extended release tablet     Sig: Take 1.5 tablets by mouth daily     Dispense:  135 tablet     Refill:  1           Plan: On the morning of the surgery take the metoprolol, amlodipine, and pantoprazole and prednisone  with just enough water to get the pills down as soon as you get out of bed.    Stop the aspirin, diclofenac gel for a week before the surgery  Increase the potassium to one and a half pills daily       Daughter called after the office visit to review meds from home and give correct dosing  Shane Zarate MD

## 2020-08-11 NOTE — PROGRESS NOTES
TELE HEALTH VISIT (AUDIO-VISUAL)    Pursuant to the emergency declaration under the [facility] waiver authority and the [employer] Act, this Virtual  Visit was conducted, with patient's/legal guardian's consent, to reduce the patient's risk of exposure to COVID-19 and provide continuity of care for an established patient. Service is  provided through a video synchronous discussion virtually to substitute for in-person clinic visit. Due to this being a TeleHealth encounter (During [place] [address] public health emergency), evaluation of the following organ systems was limited: Vitals/Constitutional/EENT/Resp/CV/GI//MS/Neuro/Skin/Thns-Kbne-Fts. Adria Isbell  1935  [id number]    Ms. Marilia Smyth is being seen virtually for a follow up visit using one of the following techniques  Doxy. me  Informed verbal consent to the virtual visit was obtained from Ms. Marilia Smyth. Risks associated with HIPPA compliance with the virtual visit was explained to the patient. Ms. Marilia Smyth is at her residence and Dr. Simón Wilson is in his office. HISTORY OF PRESENT ILLNESS:  Ms. Marilia Smyth is a 81 y.o. female  being assessed for a follow up visit for pain management for evaluation of ongoing care regarding her symptoms and monitoring of compliance with long term use high risk medications. She has a diagnosis of   1. Chronic pain syndrome    2. Primary osteoarthritis of right knee    3. Cervical radiculitis    4. Cervical spondylosis without myelopathy    5. Trigeminal neuralgia    6. Degeneration of cervical intervertebral disc    . She complains of pain in the neck  with radiation to the shoulders Bilateral and upper back . She rates the pain 6/10 and describes it as aching. Current treatment regimen has helped relieve about 30% of the pain. She denies any side effects from the current pain regimen.  Patient reports that since the last follow up visit the physical functioning is unchanged, family/social relationships are unchanged, mood is unchanged sleep patterns are unchanged, and that the overall functioning is unchanged. Patient denies misusing/abusing her narcotic pain medications or using any illegal drugs. There are No indicators for possible drug abuse, addiction or diversion problems. Ms. Contreras Fernandez states she has been doing fair, pain has been manageable with the regimen. Patient denies any constipation symptoms. Patient mentions she is using Percocet 1-2 per day along with Lyrica. She reports she is managing light house chores. She says she is using a walker for ambulation. Patient mentions she is having nose bleeds, will need Cauterization done. ALLERGIES: Patients list of allergies were reviewed     MEDICATIONS: Ms. Contreras Fernandez list of medications were reviewed. Her current medications are   Outpatient Medications Prior to Visit   Medication Sig Dispense Refill    furosemide (LASIX) 40 MG tablet Take 40 mg by mouth daily      ALPRAZolam (XANAX) 1 MG tablet Take 1 tablet by mouth 2 times daily as needed for Sleep or Anxiety for up to 60 days. TAKE ONE TABLET BY MOUTH TWICE A DAY AS NEEDED FOR ANXIETY. NO ACOHOL. NO DRIVING. Do not take with percocet.  60 tablet 1    vitamin D (ERGOCALCIFEROL) 1.25 MG (68366 UT) CAPS capsule Take 1 capsule by mouth once a week 12 capsule 1    potassium chloride (KLOR-CON M) 20 MEQ extended release tablet Take 1.5 tablets by mouth daily 135 tablet 1    allopurinol (ZYLOPRIM) 300 MG tablet Take 1 tablet by mouth daily 90 tablet 1    levothyroxine (SYNTHROID) 100 MCG tablet TAKE ONE TABLET BY MOUTH DAILY 90 tablet 1    Roflumilast 250 MCG TABS Take 250 mg by mouth Indications: at night      diclofenac sodium (VOLTAREN) 1 % GEL Apply 2-4 grams to right knee TID 5 Tube 1    alendronate (FOSAMAX) 70 MG tablet Take 70 mg by mouth every 7 days      pantoprazole (PROTONIX) 20 MG tablet Take 20 mg by mouth daily      ranolazine (RANEXA) 500 MG extended release tablet Take 500 mg by mouth 2 times daily Indications: 1 am 2 pm       atorvastatin (LIPITOR) 80 MG tablet Take 80 mg by mouth daily      amLODIPine (NORVASC) 5 MG tablet Take 2.5 mg by mouth daily       carBAMazepine (TEGRETOL-XR) 100 MG extended release tablet Take 1 tablet by mouth 2 times daily (Patient taking differently: Take 300 mg by mouth 3 times daily Indications: Taking 800 mg daily, 300mg, 200mg, 300mg ) 100 tablet 3    Respiratory Therapy Supplies (NEBULIZER COMPRESSOR) KIT 1 kit by Does not apply route once      isosorbide mononitrate (IMDUR) 30 MG CR tablet Take 1 tablet by mouth daily. (Patient taking differently: Take 30 mg by mouth 2 times daily.) 90 tablet 0    nitroGLYCERIN (NITROSTAT) 0.4 MG SL tablet 1 SL q 5 min prn chest pain. After 3 tabs, if pain persists, go to ER. 25 tablet 0    metoprolol (LOPRESSOR) 25 MG tablet Take 25 mg by mouth 2 times daily       PREDNISONE Take 10 mg by mouth daily at 1800       tiotropium (SPIRIVA) 18 MCG inhalation capsule Inhale 18 mcg into the lungs daily.  montelukast (SINGULAIR) 10 MG tablet Take 10 mg by mouth nightly.  psyllium (METAMUCIL SMOOTH TEXTURE) 28 % packet Take 1 packet by mouth 2 times daily. Take with full glass of h20 or juice       albuterol (PROVENTIL HFA;VENTOLIN HFA) 108 (90 BASE) MCG/ACT inhaler Inhale 2 puffs into the lungs every 6 hours as needed.  clopidogrel (PLAVIX) 75 MG tablet Take 75 mg by mouth daily.  aspirin 81 MG EC tablet Take 81 mg by mouth daily.  pregabalin (LYRICA) 50 MG capsule Take 1 capsule by mouth daily for 30 days.  90 capsule 0     Facility-Administered Medications Prior to Visit   Medication Dose Route Frequency Provider Last Rate Last Dose    cyanocobalamin injection 1,000 mcg  1,000 mcg Intramuscular Once Jaxon Jeffrey DO        triamcinolone acetonide (KENALOG-40) injection 40 mg  40 mg Intramuscular Once patient  -She was advised to increase fluids ( 5-7  glasses of fluid daily), limit caffeine, avoid cheese products, increase dietary fiber, increase activity and exercise as tolerated and relax regularly and enjoy meals   -continue with Lyrica same dose     Current Outpatient Medications   Medication Sig Dispense Refill    furosemide (LASIX) 40 MG tablet Take 40 mg by mouth daily      ALPRAZolam (XANAX) 1 MG tablet Take 1 tablet by mouth 2 times daily as needed for Sleep or Anxiety for up to 60 days. TAKE ONE TABLET BY MOUTH TWICE A DAY AS NEEDED FOR ANXIETY. NO ACOHOL. NO DRIVING. Do not take with percocet.  60 tablet 1    vitamin D (ERGOCALCIFEROL) 1.25 MG (53145 UT) CAPS capsule Take 1 capsule by mouth once a week 12 capsule 1    potassium chloride (KLOR-CON M) 20 MEQ extended release tablet Take 1.5 tablets by mouth daily 135 tablet 1    allopurinol (ZYLOPRIM) 300 MG tablet Take 1 tablet by mouth daily 90 tablet 1    levothyroxine (SYNTHROID) 100 MCG tablet TAKE ONE TABLET BY MOUTH DAILY 90 tablet 1    Roflumilast 250 MCG TABS Take 250 mg by mouth Indications: at night      diclofenac sodium (VOLTAREN) 1 % GEL Apply 2-4 grams to right knee TID 5 Tube 1    alendronate (FOSAMAX) 70 MG tablet Take 70 mg by mouth every 7 days      pantoprazole (PROTONIX) 20 MG tablet Take 20 mg by mouth daily      ranolazine (RANEXA) 500 MG extended release tablet Take 500 mg by mouth 2 times daily Indications: 1 am 2 pm       atorvastatin (LIPITOR) 80 MG tablet Take 80 mg by mouth daily      amLODIPine (NORVASC) 5 MG tablet Take 2.5 mg by mouth daily       carBAMazepine (TEGRETOL-XR) 100 MG extended release tablet Take 1 tablet by mouth 2 times daily (Patient taking differently: Take 300 mg by mouth 3 times daily Indications: Taking 800 mg daily, 300mg, 200mg, 300mg ) 100 tablet 3    Respiratory Therapy Supplies (NEBULIZER COMPRESSOR) KIT 1 kit by Does not apply route once      isosorbide mononitrate (IMDUR) 30 MG CR explained to the patient. Informed verbal consent was obtained. Goals of current treatment regimen include improvement in pain, restoration of functioning- with focus on improvement in physical performance, general activity, work or disability,emotional distress, health care utilization and  decreased medication consumption. Will continue to monitor progress towards achieving/maintaining therapeutic goals with special emphasis on  1. Improvement in perceived interfernce  of pain with ADL's. Ability to do home exercises independently. Ability to do household chores indoor and/or outdoor work and social and leisure activities. Improve psychosocial and physical functioning. - she is showing progression towards this treatment goal with the current regimen. She was advised against drinking alcohol with the narcotic pain medicines, advised against driving or handling machinery while adjusting the dose of medicines or if having cognitive  issues related to the current medications. Risk of overdose and death, if medicines not taken as prescribed, were also discussed. If the patient develops new symptoms or if the symptoms worsen, the patient should call the office. While transcribing every attempt was made to maintain the accuracy of the note in terms of it's contents,there may have been some errors made inadvertently. Thank you for allowing me to participate in the care of this patient.     Quinton Tolentino MD.    Cc: Alanis Schmitz MD

## 2020-08-11 NOTE — TELEPHONE ENCOUNTER
Pt daughter called back pt takes the following.     carBAMazepine (TEGRETOL-XR) 200 mg  2 pills 2x a day  isosorbide mononitrate (IMDUR) is 6o mg  metoprolol (LOPRESSOR)  50 mg 1/2 2x a day    [phone]

## 2020-08-11 NOTE — PATIENT INSTRUCTIONS
At home check on the carbamazepine, metoprolol, isosorbide  Numbers and call me back to update the correct dosing  On the morning of the surgery take the metoprolol, amlodipine, and pantoprazole and prednisone  with just enough water to get the pills down as soon as you get out of bed.    Stop the aspirin, diclofenac gel for a week before the surgery  Increase the potassium to one and a half pills daily

## 2020-08-13 NOTE — TELEPHONE ENCOUNTER
Garold Spurling w/ [facility] is calling about the [facility]   Pt is having surgery tomorrow and they are needing to know if pt is needing pulmonary clearance? Pl advise.      [phone]

## 2020-10-06 NOTE — PROGRESS NOTES
Vaccine Information Sheet, \"Influenza - Inactivated\"  given to Tasha Veloz, or parent/legal guardian of  Tasha Veloz and verbalized understanding. Patient responses:    Have you ever had a reaction to a flu vaccine? No  Do you have any current illness? No  Have you ever had Guillian Boise Syndrome? No  Do you have a serious allergy to any of the follow: Neomycin, Polymyxin, Thimerosal, eggs or egg products? No    Flu vaccine given per order. Please see immunization tab. Risks and benefits explained. Current VIS given.

## 2020-10-06 NOTE — PROGRESS NOTES
TELE HEALTH VISIT (AUDIO-VISUAL)    Pursuant to the emergency declaration under the [facility], [facility] waiver authority and the [employer] Act, this Virtual  Visit was conducted, with patient's/legal guardian's consent, to reduce the patient's risk of exposure to COVID-19 and provide continuity of care for an established patient. Service is  provided through a video synchronous discussion virtually to substitute for in-person clinic visit. Due to this being a TeleHealth encounter (During [facility] public health emergency), evaluation of the following organ systems was limited: Vitals/Constitutional/EENT/Resp/CV/GI//MS/Neuro/Skin/Ohlt-Zzdr-Nzf. Jeremiah Mast  1935  [id number]    Ms. Callie Coronel is being seen virtually for a follow up visit using one of the following techniques  Doxy. me  Informed verbal consent to the virtual visit was obtained from Ms. Callie Coronel. Risks associated with HIPPA compliance with the virtual visit was explained to the patient. Ms. Callie Coronel is at her residence and Dr. Brigitte Souza is in his office. HISTORY OF PRESENT ILLNESS:  Ms. Callie Coronel is a 81 y.o. female  being assessed for a follow up visit for pain management for evaluation of ongoing care regarding her symptoms and monitoring of compliance with long term use high risk medications. She has a diagnosis of   1. Chronic pain syndrome    2. Primary osteoarthritis of right knee    3. Cervical radiculitis    4. Degeneration of cervical intervertebral disc    5. Trigeminal neuralgia    6. Cervical spondylosis without myelopathy    . She complains of pain in the neck  with radiation to the upper back . She rates the pain 7/10 and describes it as aching. Current treatment regimen has helped relieve about 10% of the pain. She denies any side effects from the current pain regimen.  Patient reports that since the last follow up visit the  allopurinol (ZYLOPRIM) 300 MG tablet Take 1 tablet by mouth daily 90 tablet 1    Roflumilast 250 MCG TABS Take 250 mg by mouth Indications: at night      diclofenac sodium (VOLTAREN) 1 % GEL Apply 2-4 grams to right knee TID 5 Tube 1    pantoprazole (PROTONIX) 20 MG tablet Take 20 mg by mouth daily      ranolazine (RANEXA) 500 MG extended release tablet Take 500 mg by mouth 2 times daily Indications: 1 am 2 pm       atorvastatin (LIPITOR) 80 MG tablet Take 80 mg by mouth daily      amLODIPine (NORVASC) 5 MG tablet Take 2.5 mg by mouth daily       Respiratory Therapy Supplies (NEBULIZER COMPRESSOR) KIT 1 kit by Does not apply route once      nitroGLYCERIN (NITROSTAT) 0.4 MG SL tablet 1 SL q 5 min prn chest pain. After 3 tabs, if pain persists, go to ER. 25 tablet 0    PREDNISONE Take 10 mg by mouth daily at 1800       tiotropium (SPIRIVA) 18 MCG inhalation capsule Inhale 18 mcg into the lungs daily.  montelukast (SINGULAIR) 10 MG tablet Take 10 mg by mouth nightly.  psyllium (METAMUCIL SMOOTH TEXTURE) 28 % packet Take 1 packet by mouth 2 times daily. Take with full glass of h20 or juice       albuterol (PROVENTIL HFA;VENTOLIN HFA) 108 (90 BASE) MCG/ACT inhaler Inhale 2 puffs into the lungs every 6 hours as needed.  clopidogrel (PLAVIX) 75 MG tablet Take 75 mg by mouth daily.  aspirin 81 MG EC tablet Take 81 mg by mouth daily.  pregabalin (LYRICA) 50 MG capsule Take 1 capsule by mouth daily for 30 days. 90 capsule 0     Facility-Administered Medications Prior to Visit   Medication Dose Route Frequency Provider Last Rate Last Dose    cyanocobalamin injection 1,000 mcg  1,000 mcg Intramuscular Once Sea Franklin DO        triamcinolone acetonide (KENALOG-40) injection 40 mg  40 mg Intramuscular Once Cony Gr MD           SOCIAL/FAMILY/PAST MEDICAL HISTORY: Ms. Kerry Gastelum, family and past medical history was reviewed.      REVIEW OF SYSTEMS:    Respiratory: Negative for apnea, chest tightness and shortness of breath or change in baseline breathing. Gastrointestinal: Negative for nausea, vomiting, abdominal pain, diarrhea, constipation, blood in stool and abdominal distention. PHYSICAL EXAM:   Nursing note and vitals per patient reviewed. There were no vitals taken for this visit. Constitutional: She appears well-developed and well-nourished. No acute distress. No respiratory distress. Skin: Skin appears to be warm and dry. No rashes or any other marks noted. She is not diaphoretic. Respiratory/Pulmonary: NO conversational dyspnea, no accessory muscle use, no coughing during exam. She does not appear to be in labored breathing. Neurological/Psychiatric:She is alert and oriented to person, place, and time. Coordination is  normal.  Her mood isAppropriate and affect is Flat/blunted and Anxious. Musculoskeletal / Extremities: Range of motion is normal. Gait is normal, assistive devices use: none. IMPRESSION:   1. Chronic pain syndrome    2. Primary osteoarthritis of right knee    3. Cervical radiculitis    4. Degeneration of cervical intervertebral disc    5. Trigeminal neuralgia    6.  Cervical spondylosis without myelopathy        PLAN:  Informed verbal consent regarding treatment was obtained  -Continue with current opioid regimen Percocet 1-2 per day   -Monitor dizziness   -Discuss with Neurology regarding Tegretol dose   -Walking as tolerated with walker    Current Outpatient Medications   Medication Sig Dispense Refill    levothyroxine (SYNTHROID) 100 MCG tablet TAKE ONE TABLET BY MOUTH DAILY 90 tablet 0    carBAMazepine (TEGRETOL XR) 200 MG extended release tablet Take 2 tablets by mouth 2 times daily 60 tablet 0    isosorbide mononitrate (IMDUR) 60 MG extended release tablet Take 1 tablet by mouth daily 30 tablet 1    metoprolol tartrate (LOPRESSOR) 50 MG tablet Take 0.5 tablets by mouth 2 times daily 30 tablet 1    furosemide (LASIX) 40 MG (PLAVIX) 75 MG tablet Take 75 mg by mouth daily.  aspirin 81 MG EC tablet Take 81 mg by mouth daily.  pregabalin (LYRICA) 50 MG capsule Take 1 capsule by mouth daily for 30 days. 90 capsule 0     Current Facility-Administered Medications   Medication Dose Route Frequency Provider Last Rate Last Dose    cyanocobalamin injection 1,000 mcg  1,000 mcg Intramuscular Once Amada Erik,         triamcinolone acetonide (KENALOG-40) injection 40 mg  40 mg Intramuscular Once Nilesh Harrell MD         I will continue her current medication regimen  which is part of the above treatment schedule. It has been helping with Ms. Inman's chronic  medical problems which for this visit include:   Diagnoses of Chronic pain syndrome, Primary osteoarthritis of right knee, Cervical radiculitis, Degeneration of cervical intervertebral disc, Trigeminal neuralgia, and Cervical spondylosis without myelopathy were pertinent to this visit. Risks and benefits of the medications and other alternative treatments  including no treatment were discussed with the patient. The common side effects of these medications were also explained to the patient. Informed verbal consent was obtained. Goals of current treatment regimen include improvement in pain, restoration of functioning- with focus on improvement in physical performance, general activity, work or disability,emotional distress, health care utilization and  decreased medication consumption. Will continue to monitor progress towards achieving/maintaining therapeutic goals with special emphasis on  1. Improvement in perceived interfernce  of pain with ADL's. Ability to do home exercises independently. Ability to do household chores indoor and/or outdoor work and social and leisure activities. Improve psychosocial and physical functioning. - she is showing progression towards this treatment goal with the current regimen.      She was advised against drinking alcohol with the narcotic pain medicines, advised against driving or handling machinery while adjusting the dose of medicines or if having cognitive  issues related to the current medications. Risk of overdose and death, if medicines not taken as prescribed, were also discussed. If the patient develops new symptoms or if the symptoms worsen, the patient should call the office. While transcribing every attempt was made to maintain the accuracy of the note in terms of it's contents,there may have been some errors made inadvertently. Thank you for allowing me to participate in the care of this patient.     Sarah Beth Reyes MD.    Cc: Nay Draper MD

## 2020-10-21 PROBLEM — J18.9 PNEUMONIA: Status: ACTIVE | Noted: 2020-01-01

## 2020-10-21 PROBLEM — R60.0 EDEMA OF RIGHT LOWER EXTREMITY: Status: ACTIVE | Noted: 2020-01-01

## 2020-10-21 PROBLEM — E11.9 DIABETES MELLITUS (HCC): Status: ACTIVE | Noted: 2018-02-15

## 2020-10-21 PROBLEM — I50.9 CHF EXACERBATION (HCC): Status: ACTIVE | Noted: 2020-01-01

## 2020-10-21 PROBLEM — J44.1 COPD EXACERBATION (HCC): Status: ACTIVE | Noted: 2020-01-01

## 2020-10-21 PROBLEM — J96.20 ACUTE ON CHRONIC RESPIRATORY FAILURE (HCC): Status: ACTIVE | Noted: 2020-01-01

## 2020-10-21 PROBLEM — U07.1 COVID-19: Status: ACTIVE | Noted: 2020-01-01

## 2020-10-21 PROBLEM — A41.9 SEPSIS (HCC): Status: ACTIVE | Noted: 2020-01-01

## 2020-10-21 NOTE — PLAN OF CARE
Problem: Falls - Risk of:  Goal: Will remain free from falls  Description: Will remain free from falls  10/21/2020 1818 by Thomas Bejarano RN  Outcome: Ongoing     Problem: Falls - Risk of:  Goal: Absence of physical injury  Description: Absence of physical injury  10/21/2020 1818 by Thomas Bejarano RN  Outcome: Ongoing     Problem: Airway Clearance - Ineffective  Goal: Achieve or maintain patent airway  10/21/2020 1818 by Thomas Bejarano RN  Outcome: Ongoing     Problem: Gas Exchange - Impaired  Goal: Absence of hypoxia  10/21/2020 1818 by Thomas Bejarano RN  Outcome: Ongoing     Problem: Gas Exchange - Impaired  Goal: Promote optimal lung function  10/21/2020 1818 by Thomas Bejarano RN  Outcome: Ongoing     Problem: Breathing Pattern - Ineffective  Goal: Ability to achieve and maintain a regular respiratory rate  10/21/2020 1818 by Thomas Bejarano RN  Outcome: Ongoing     Problem: Body Temperature -  Risk of, Imbalanced  Goal: Ability to maintain a body temperature within defined limits  10/21/2020 1818 by Thomas Bejarano RN  Outcome: Ongoing     Problem: Body Temperature -  Risk of, Imbalanced  Goal: Will regain or maintain usual level of consciousness  10/21/2020 1818 by Thomas Bejarano RN  Outcome: Ongoing     Problem:  Body Temperature -  Risk of, Imbalanced  Goal: Complications related to the disease process, condition or treatment will be avoided or minimized  10/21/2020 1818 by Thomas Bejarano RN  Outcome: Ongoing     Problem: Isolation Precautions - Risk of Spread of Infection  Goal: Prevent transmission of infection  10/21/2020 1818 by Thomas Bejarano RN  Outcome: Ongoing     Problem: Nutrition Deficits  Goal: Optimize nutrtional status  10/21/2020 1818 by Thomas Bejarano RN  Outcome: Ongoing     Problem: Risk for Fluid Volume Deficit  Goal: Maintain normal heart rhythm  10/21/2020 1818 by Thomas Bejarano RN  Outcome: Ongoing     Problem: Risk for Fluid Volume Deficit  Goal: Maintain absence of muscle cramping  10/21/2020 1818 by Thomas Bejarano RN  Outcome: Ongoing     Problem: Risk for Fluid Volume Deficit  Goal: Maintain normal serum potassium, sodium, calcium, phosphorus, and pH  10/21/2020 1818 by Beka Escobar RN  Outcome: Ongoing     Problem: Loneliness or Risk for Loneliness  Goal: Demonstrate positive use of time alone when socialization is not possible  10/21/2020 1818 by Beka Escobar RN  Outcome: Ongoing     Problem: Fatigue  Goal: Verbalize increase energy and improved vitality  10/21/2020 1818 by Beka Escobar RN  Outcome: Ongoing     Problem: Patient Education: Go to Patient Education Activity  Goal: Patient/Family Education  10/21/2020 1818 by Beka Escobar RN  Outcome: Ongoing     Problem: OXYGENATION/RESPIRATORY FUNCTION  Goal: Patient will maintain patent airway  Outcome: Ongoing     Problem: OXYGENATION/RESPIRATORY FUNCTION  Goal: Patient will achieve/maintain normal respiratory rate/effort  Description: Respiratory rate and effort will be within normal limits for the patient  Outcome: Ongoing     Problem: HEMODYNAMIC STATUS  Goal: Patient has stable vital signs and fluid balance  Outcome: Ongoing     Problem: FLUID AND ELECTROLYTE IMBALANCE  Goal: Fluid and electrolyte balance are achieved/maintained  Outcome: Ongoing     Problem: ACTIVITY INTOLERANCE/IMPAIRED MOBILITY  Goal: Mobility/activity is maintained at optimum level for patient  Outcome: Ongoing

## 2020-10-21 NOTE — PROGRESS NOTES
Pharmacy Medication Reconciliation Note     List of medications patient is currently taking is complete. Source of information:   1. Care Everywhere  2. MD office visit  3. patient's daughter    Notes regarding home medications:   1. Removed Plavix  2. added Wixela,Zithromax,Benzonatate,Duoneb  3.  changed dose of Ranexa to 1000mg BID  4. added Taper instructions for Prednisone    Denies taking any other OTC or herbal medications    Henry Roberto RPh 10/21/2020 2:46 PM

## 2020-10-21 NOTE — PROGRESS NOTES
Pt is awake and alert. Does at times have SOB. RR is around 24. I/E wheezing is heard bilaterally. Pt does wear Oxygen with activity. Family was concerned about patient having a fever tonight.  Will continue to monitor

## 2020-10-21 NOTE — PROGRESS NOTES
Progress Note  Admit Date: 10/20/2020      PCP: Terence King MD     CC: F/U for COVID    Days in hospital:  0    SUBJECTIVE / Interval History:  Patient feels okay. Feels close to baseline. No complaints other than some cough        Allergies  Acetaminophen-codeine and Codeine    Medications    Scheduled Meds:   sodium chloride flush  10 mL Intravenous 2 times per day    enoxaparin  30 mg Subcutaneous BID    aspirin  81 mg Oral Daily    atorvastatin  80 mg Oral Daily    levothyroxine  100 mcg Oral Daily    metoprolol tartrate  25 mg Oral BID    pantoprazole  20 mg Oral Daily    pregabalin  50 mg Oral Daily    tiotropium  2 puff Inhalation Daily    [START ON 10/22/2020] azithromycin  500 mg Intravenous Q24H    And    [START ON 10/22/2020] cefTRIAXone (ROCEPHIN) IV  1 g Intravenous Q24H    dexamethasone  6 mg Oral Daily    furosemide  20 mg Intravenous BID    lisinopril  5 mg Oral Daily     Continuous Infusions:    PRN Meds:  sodium chloride flush, acetaminophen **OR** acetaminophen, ALPRAZolam, perflutren lipid microspheres, ondansetron, guaiFENesin-dextromethorphan    Vitals    /68   Pulse 74   Temp 98.8 °F (37.1 °C) (Oral)   Resp 18   Ht 5' 6\" (1.676 m)   Wt 186 lb 4.6 oz (84.5 kg)   SpO2 97%   BMI 30.07 kg/m²     Exam:    Gen: No distress. Eyes: PERRL. No sclera icterus. No conjunctival injection. ENT: No discharge. Pharynx clear. External appearance of ears and nose normal.  Neck: Trachea midline. No obvious mass. Resp: No accessory muscle use. No crackles. No wheezes. No rhonchi. No dullness on percussion. CV: Regular rate. Regular rhythm. No murmur or rub. Trace edema. GI: Non-tender. Non-distended. No hernia. Skin: Warm, dry, normal texture and turgor. No nodule on exposed extremities. Lymph: No cervical LAD. No supraclavicular LAD. M/S: No cyanosis. No clubbing. No joint deformity. Neuro: Moves all four extremities. CN 2-12 tested, no defect noted.   Psych: Oriented x 3. No anxiety. Awake. Alert. Intact judgement and insight. Data    LABS  CBC:   Recent Labs     10/20/20  2227 10/21/20  0551   WBC 8.4 11.6*   HGB 10.1* 9.6*   HCT 31.5* 30.3*   MCV 87.8 87.2    164     BMP:   Recent Labs     10/20/20  2227 10/21/20  0551    137   K 3.8 3.9   CL 97* 95*   CO2 29 32   BUN 13 13   CREATININE 0.9 1.0   GLUCOSE 133* 270*     POC GLUCOSE:  No results for input(s): POCGLU in the last 72 hours. LIVER PROFILE:   Recent Labs     10/20/20  2227 10/21/20  0551   AST 18 16   ALT 12 12   LABALBU 3.5 3.5   BILITOT <0.2 <0.2   ALKPHOS 79 83     PT/INR:   Recent Labs     10/21/20  0551   PROTIME 12.6   INR 1.09     APTT:   Recent Labs     10/21/20  0551   APTT 24.5     UA:No results for input(s): NITRITE, COLORU, PHUR, LABCAST, WBCUA, RBCUA, MUCUS, TRICHOMONAS, YEAST, BACTERIA, CLARITYU, SPECGRAV, LEUKOCYTESUR, UROBILINOGEN, BILIRUBINUR, BLOODU, GLUCOSEU, KETUA, AMORPHOUS in the last 72 hours. Microbiology:  Wound Culture: No results for input(s): WNDABS, ORG in the last 72 hours. Invalid input(s):  LABGRAM  Nasal Culture: No results for input(s): ORG, MRSAPCR in the last 72 hours. Blood Culture: No results for input(s): BC, BLOODCULT2 in the last 72 hours. Fungal Culture:   No results for input(s): FUNGSM in the last 72 hours. No results for input(s): FUNCXBLD in the last 72 hours. CSF Culture:  No results for input(s): COLORCSF, APPEARCSF, CFTUBE, CLOTCSF, WBCCSF, RBCCSF, NEUTCSF, NUMCELLSCSF, LYMPHSCSF, MONOCSF, GLUCCSF, VOLCSF in the last 72 hours. Respiratory Culture:  No results for input(s): Brigitte Gill in the last 72 hours. AFB:No results for input(s): AFBSMEAR in the last 72 hours. Urine Culture  No results for input(s): LABURIN in the last 72 hours. RADIOLOGY:    CT CHEST PULMONARY EMBOLISM W CONTRAST   Final Result   Negative for acute pulmonary embolism.       Multifocal ground-glass and consolidative airspace disease is suspicious for infection, including atypical and viral pathogens (including COVID-19). Mild interstitial pulmonary edema. Mild subpleural reticular abnormality and bronchiolectasis, suggesting   underlying interstitial lung disease. Mild mediastinal and hilar lymphadenopathy, presumably reactive. XR CHEST PORTABLE   Final Result   Features of heart failure, with moderate interstitial pulmonary edema. Basilar opacities, favor atelectasis. Superimposed pneumonia or aspiration   could be present in the appropriate context. VL Extremity Venous Bilateral    (Results Pending)       CONSULTS:    IP CONSULT TO HEART FAILURE NURSE/COORDINATOR  IP CONSULT TO DIETITIAN  IP CONSULT TO CARDIOLOGY  IP CONSULT TO INFECTIOUS DISEASES  IP CONSULT TO PULMONOLOGY    ASSESSMENT AND PLAN:      Active Problems:    HTN (hypertension)    Hypertriglyceridemia    Coronary artery disease involving native coronary artery of native heart with angina pectoris (HCC)    Diabetes mellitus ([address]    COVID-19    Pneumonia    CHF exacerbation (HCC)    COPD exacerbation (HCC)    Acute on chronic respiratory failure ([address]    Sepsis ([address]    Edema of right lower extremity  Resolved Problems:    * No resolved hospital problems. *    Patient is a 61-year-old female with a past medical history of COPD, chronic respiratory failure, coronary artery disease, hypertension, previous CVA and CHF who presented with worsening shortness of breath and hypoxia. Patient was found to be hypoxic in the low 80s on 4 L of oxygen on admission. Acute on chronic hypoxic respiratory failure  -Secondary to COVID-19  -Wean oxygen as tolerated. Uses 2 L of oxygen    COVID-19 pneumonia  -Started on Decadron  -Infectious disease consult  -Patient is stable on 2 L of oxygen. If she deteriorates will give convalescent plasma and remdesivir    Possible bacterial pneumonia  -Procalcitonin elevated on admission.   Treat as gram-negative/atypical  -Continue

## 2020-10-21 NOTE — PROGRESS NOTES
HF RN consult received from Dr Debbie Foster as part of HF order set. Chart reviewed. Cardiology consulted. Pt Covid +. Symptoms felt to be r/t COVID PNA vs HF. Hospitalist notes current weight 186# is less than pt's known dry weight 193#. Pt has history of diastolic HF and follows with [facility]. She was last seen in office on 7/23/20 by Dr Addy Nguyen. Weight at that time 197#. Pt was considered stable. HF measures have been added : daily weights, I/O and sodium /fluid restricted diet. HF careplan is active. HF instructions have been added to AVS.     HF RNs will continue to follow and assist with transition of care at discharge.

## 2020-10-21 NOTE — CONSULTS
[address]                                  CONSULTATION    PATIENT NAME: Emeka Grimes                  :        1935  MED REC NO:   [id number]                          ROOM:       [id number]  ACCOUNT NO:   [de-identified]                           ADMIT DATE: 10/20/2020  PROVIDER:     Dexter Gaston MD    CARDIOLOGY CONSULTATION    CONSULT DATE:  10/21/2020    HISTORY OF PRESENT ILLNESS:  This is an 23-year-old female with a  history of COPD, coronary artery disease, hypertension, renal  insufficiency, previous CVA, who presented to the hospital with  progressive shortness of breath. She was found to have oxygen  saturation of 93% on 4 L of oxygen. She has also been having trouble  with some arm pain and jaw pain with exertion. She was brought to the  hospital and was admitted. She was also COVID tested and she has been  positive for COVID. Because of her cardiac history, Cardiology  consultation has been requested. She has no nausea, vomiting. She  denied any fever but she does complain of having significant coughing  with any significant sputum production. REVIEW OF SYSTEM:  Please see HPI. All other systems are reviewed and  they are negative. PAST MEDICAL HISTORY:  1. History of coronary artery disease. She is status post RCA stent  three years ago followed by in-stent restenosis requiring a repeat  stenting. She has been followed at [facility]. 2.  History of COPD. 3.  Hypertension. 4.  Renal insufficiency. 5.  History of previous CVA. PAST SURGICAL HISTORY:  She had stents placed in her right coronary  artery as mentioned, cholecystectomy, cataract removal.    SOCIAL HISTORY:  She had a remote history of smoking but quit about 37  years ago. Denies any alcohol abuse.     FAMILY HISTORY:  Brother had heart disease but no other history of  premature atherosclerosis. MEDICATIONS:  Have been reviewed. ALLERGIES:  Have been reviewed. PHYSICAL EXAMINATION:  VITAL SIGNS:  Her pulse is 74, blood pressure is 118/68, respirations  are 18, oxygen saturation 97%. CONSTITUTIONAL:  She is alert and oriented but slightly short of breath. HEENT:  Her neck is supple. I am unable to appreciate jugular venous  pulse. No carotid bruits. No thyromegaly. Eyes show slightly pale  conjunctivae. No icterus noted. CARDIAC:  Regular rate and rhythm. No gallop, murmur, or rub  appreciated. LUNGS:  Bilateral crackles. ABDOMEN:  Soft, nontender. Bowel sounds are present. EXTREMITIES:  No edema. NEUROLOGICAL:  She is alert, oriented. Cranial nerves II through XII  intact. No focal deficit. SKIN:  No rashes. LABORATORY DATA:  Her sodium is 137, potassium 3.9, chloride 95, bicarb  22, BUN is 13, creatinine is 1. ProBNP is 901. White count 11.6,  hemoglobin 9.6, hematocrit is 30.3. The patient has a low iron  saturation. The fibrinogen is 499. D-dimer is 757. The COVID status  is positive. Chest CT showed multifocal ground glass appearance suggestive of COVID  pneumonia and mild interstitial edema. EKG shows sinus rhythm, no acute ST and T-wave changes. IMPRESSION:  1. This is an 21-year-old female who has presented with hypoxic  respiratory failure. This is very likely due to her underlying COPD and  COVID pneumonitis. I cannot totally exclude some component of pulmonary  edema. 2.  Coronary artery disease status post remote stenting. Currently with  some symptoms suggestive of angina. 3.  Anemia, iron deficiency. Etiology unknown but may need further  investigation when she is treated for her COVID pneumonia. 4.  COVID pneumonitis. The patient is diagnosed with COVID and has  hypoxia related to that. Currently on treatment per primary team.    RECOMMENDATIONS:  1.   We will give the patient a small dose of Lasix for possibly a small  element of pulmonary congestion. 2.  We will continue aggressive treatment for COVID pneumonitis. 3.  We will keep the patient on low-dose metoprolol therapy as well as  statin drugs. 4.  Once her symptoms improve, she will require next treatment of her  anemia and consider workup in the future since she appears to have  significant iron deficiency anemia. 5.  We will do further cardiac testing on the patient when she recovers  from her COVID pneumonia. 6.  Complexity of medical decision making is very high. I appreciate the opportunity to participate in the care of this pleasant  female.         Albertina Felty, MD    D: 10/21/2020 11:31:38       T: 10/21/2020 14:51:22     [email or web address]  Job#: [id number]     Doc#: [id number]    CC:

## 2020-10-21 NOTE — ED NOTES
Bed: A-17  Expected date:   Expected time:   Means of arrival: [facility]  Comments:  resp distress     Starr Morales RN  10/20/20 1789

## 2020-10-21 NOTE — ED PROVIDER NOTES
[address]  [address] [address]  Dept: [phone]  Loc: [address] ENCOUNTER        This patient was seen and evaluated per myself in conjunction with ED attending Dr. Tj Cruz    Chief Complaint   Patient presents with    Shortness of Breath     Pt in via EMS with fever and shortness of breath that \"worsened tonight\". EMS reports that pt had \"oxygen saturation of 83% on their arrival\". Pt reports being on \"2 L while at home\". Pt presents to ED on 4 L nasal cannula. Pt alert and oriented on arrival with no signs of distress noted. HPI    Eva Hoang is a 81 y.o. female who presents with shortness of breath. Onset was yesterday. Her daughter called the pulmonologist that she typically sees at [facility], try to get steroids and antibiotics called in but did not get these filled. The duration has been constant since the onset. The context was that the symptoms started spontaneously. The patient has had no other associated symptoms. No alleviating factors. States that she has \"bad lungs. \"  Does have a history of COPD and CHF. Typically wears 2 L nasal cannula at home, is currently on 3 L on arrival but satting 100% in no acute respiratory distress. She is denying any chest pain, cough, nasal congestion or postnasal drip. Denies any fevers or chills, abdominal pain, nausea vomiting or diarrhea. States that she has been taking doing her home nebulizer treatments with some mild relief. Came to the ED for further evaluation and treatment. REVIEW OF SYSTEMS    Cardiac: no chest pain, no palpitations, no syncope  Respiratory: see HPI, non-productive cough  Neurologic: no syncope or LOC  GI: no vomiting, no diarrhea  General: no measured fever  All other systems reviewed and are negative.     PAST MEDICAL & SURGICAL HISTORY    Past Medical History:   Diagnosis Date    None   Relationships    Social connections     Talks on phone: None     Gets together: None     Attends Mu-ism service: None     Active member of club or organization: None     Attends meetings of clubs or organizations: None     Relationship status: None    Intimate partner violence     Fear of current or ex partner: None     Emotionally abused: None     Physically abused: None     Forced sexual activity: None   Other Topics Concern    None   Social History Narrative    None     Family History   Problem Relation Age of Onset    Cancer Mother     Tuberculosis Father     Cancer Sister     Heart Disease Brother     Cancer Brother     Heart Attack Brother        PHYSICAL EXAM    VITAL SIGNS: BP (!) 126/53   Pulse 93   Temp 101.3 °F (38.5 °C) (Oral)   Resp 20   Wt 198 lb 6.6 oz (90 kg)   SpO2 94%   BMI 32.02 kg/m²    Constitutional:  Well developed, well nourished, no acute distress   HENT:  Atraumatic, moist mucus membranes  Neck: supple, no JVD   Respiratory:  Lungs have rhonchorous lung sounds throughout all lung fields with expiratory wheezes, currently on 3 L nasal cannula satting 100%, nursing staff to wean to baseline of 2 L, no retractions, or accessory muscle use. No acute respiratory distress noted  Cardiovascular:  regular rate, no murmurs  Vascular: Radial and DP pulses 2+ and equal bilaterally  GI:  Soft, nontender, normal bowel sounds  Musculoskeletal:  no lower extremity edema, no lower extremity asymmetry, no calf tenderness, no thigh tenderness, no acute deformities  Integument:  Skin is warm and dry, no petechiae   Neurologic:  Alert & oriented, no slurred speech  Psych: Pleasant affect, no hallucinations    EKG    Please see the physician note for EKG interpretation.     LABS  Results for orders placed or performed during the hospital encounter of 10/20/20   CBC Auto Differential   Result Value Ref Range    WBC 8.4 4.0 - 11.0 K/uL    RBC 3.59 (L) 4.00 - 5.20 M/uL    Hemoglobin 10.1 (L) 12.0 - 16.0 g/dL    Hematocrit 31.5 (L) 36.0 - 48.0 %    MCV 87.8 80.0 - 100.0 fL    MCH 28.2 26.0 - 34.0 pg    MCHC 32.1 31.0 - 36.0 g/dL    RDW 17.2 (H) 12.4 - 15.4 %    Platelets 995 048 - 020 K/uL    MPV 9.3 5.0 - 10.5 fL    Neutrophils % 83.1 %    Lymphocytes % 9.0 %    Monocytes % 7.6 %    Eosinophils % 0.0 %    Basophils % 0.3 %    Neutrophils Absolute 7.0 1.7 - 7.7 K/uL    Lymphocytes Absolute 0.8 (L) 1.0 - 5.1 K/uL    Monocytes Absolute 0.6 0.0 - 1.3 K/uL    Eosinophils Absolute 0.0 0.0 - 0.6 K/uL    Basophils Absolute 0.0 0.0 - 0.2 K/uL   Comprehensive Metabolic Panel w/ Reflex to MG   Result Value Ref Range    Sodium 136 136 - 145 mmol/L    Potassium reflex Magnesium 3.8 3.5 - 5.1 mmol/L    Chloride 97 (L) 99 - 110 mmol/L    CO2 29 21 - 32 mmol/L    Anion Gap 10 3 - 16    Glucose 133 (H) 70 - 99 mg/dL    BUN 13 7 - 20 mg/dL    CREATININE 0.9 0.6 - 1.2 mg/dL    GFR Non-African American 60 (A) >60    GFR African American >60 >60    Calcium 8.3 8.3 - 10.6 mg/dL    Total Protein 6.3 (L) 6.4 - 8.2 g/dL    Alb 3.5 3.4 - 5.0 g/dL    Albumin/Globulin Ratio 1.3 1.1 - 2.2    Total Bilirubin <0.2 0.0 - 1.0 mg/dL    Alkaline Phosphatase 79 40 - 129 U/L    ALT 12 10 - 40 U/L    AST 18 15 - 37 U/L    Globulin 2.8 g/dL   Troponin   Result Value Ref Range    Troponin <0.01 <0.01 ng/mL   Brain Natriuretic Peptide   Result Value Ref Range    Pro- (H) 0 - 449 pg/mL   COVID-19   Result Value Ref Range    SARS-CoV-2, NAAT DETECTED (A) Not Detected   Lactate, Sepsis   Result Value Ref Range    Lactic Acid, Sepsis 0.8 0.4 - 1.9 mmol/L         RADIOLOGY/PROCEDURES    XR CHEST PORTABLE   Final Result   Features of heart failure, with moderate interstitial pulmonary edema. Basilar opacities, favor atelectasis. Superimposed pneumonia or aspiration   could be present in the appropriate context. ED COURSE & MEDICAL DECISION MAKING    Pertinent Labs & Imaging studies reviewed and interpreted.  (See chart for details)    See chart for details of medications given during the ED stay. Vitals:    10/20/20 2245 10/20/20 2300 10/20/20 2305 10/21/20 0030   BP: (!) 140/61   (!) 126/53   Pulse: 84   93   Resp: 13 23 23 20   Temp: 101.3 °F (38.5 °C)      TempSrc: Oral      SpO2: 95% 99%  94%   Weight:    198 lb 6.6 oz (90 kg)       Differential Diagnosis: Acute Coronary Syndrome, Congestive Heart Failure, Myocardial Infarction, Pulmonary Embolus, Pneumonia, Pneumothorax, other    CRITICAL CARE NOTE:  There was a high probability of clinically significant life-threatening deterioration of the patient's condition requiring my urgent intervention. Total critical care time was at least 10 minutes. This includes vital sign monitoring, pulse oximetry monitoring, telemetry monitoring, clinical response to the IV medications, reviewing the nursing notes, consultation time, dictation/documentation time, and interpretation of the labwork. This excludes any separately billable procedures performed. The critical care time above also includes time spent obtaining history from electronic chart, as the patient was unable to provide the history AND the history obtained was directly relevant to the care of the patient. Patient is afebrile and nontoxic in appearance. Labs reveal no leukocytosis; stable anemia. Metabolic panel unremarkable. Negative. No ST changes acutely on EKG. No lactic acidosis. Patient started on as a thrown Rocephin given the pyrexia. Does not appear to be septic. COVID-19 was detected on rapid swab. CXR findings as above. Given that the patient is Covid positive with COPD exacerbation and CHF exacerbation and possible underlying bacterial pneumonia I do believe she needs to be admitted for further evaluation and treatment. Also is requiring constant 2 L nasal cannula when she is typically only requiring this intermittently at home.   I therefore consulted the hospitalist, spoke to  [facility] agreed to admit patient and write orders for admission. FINAL IMPRESSION    1. Acute on chronic congestive heart failure, unspecified heart failure type ([address]    2. COPD exacerbation ([facility]    3. Fever, unspecified fever cause    4. Dyspnea and respiratory abnormalities    5.  COVID-19          PLAN  Admission to the hospital    (Please note that this note was completed with a voice recognition program.  Every attempt was made to edit the dictations, but inevitably there remain words that are mis-transcribed.)        Caesar An, RENATA - YESSENIA  10/21/20 0050

## 2020-10-21 NOTE — ACP (ADVANCE CARE PLANNING)
Advance Care Planning     Advance Care Planning Activator (Inpatient)  Conversation Note      Date of ACP Conversation: 10/21/2020    Conversation Conducted with: Patient with Decision Making Capacity    ACP Activator: [facility] Decision Maker:     Current Designated Health Care Decision Maker:     If no Decision Maker listed above or available through scanned documents, then:    If no Authorized Decision Maker has previously been identified, then patient chooses Health Care Decision Maker:  \"Who would you like to name as your primary health care decision-maker? \"               Name: Jama Asencio       Relationship: daughter          Phone number: [phone]  [facility] this person be reached easily? \" Yes    Care Preferences    Ventilation: \"If you were in your present state of health and suddenly became very ill and were unable to breathe on your own, what would your preference be about the use of a ventilator (breathing machine) if it were available to you? \"      Would the patient desire the use of ventilator (breathing machine)?: unsure, would want the doctor to discuss with her daughter     \"If your health worsens and it becomes clear that your chance of recovery is unlikely, what would your preference be about the use of a ventilator (breathing machine) if it were available to you? \"     Would the patient desire the use of ventilator (breathing machine)?: No      Resuscitation  \"CPR works best to restart the heart when there is a sudden event, like a heart attack, in someone who is otherwise healthy. Unfortunately, CPR does not typically restart the heart for people who have serious health conditions or who are very sick. \"    \"In the event your heart stopped as a result of an underlying serious health condition, would you want attempts to be made to restart your heart (answer \"yes\" for attempt to resuscitate) or would you prefer a natural death (answer \"no\" for do not attempt to resuscitate)? \" yes     [] Yes   [x] No   Educated Patient / Liya Dennise regarding differences between Advance Directives and portable DNR orders.     Length of ACP Conversation in minutes:      Conversation Outcomes:  [x] ACP discussion completed  [] Existing advance directive reviewed with patient; no changes to patient's previously recorded wishes  [] New Advance Directive completed  [] Portable Do Not Rescitate prepared for Provider review and signature  [] POLST/POST/MOLST/MOST prepared for Provider review and signature      Follow-up plan:    [] Schedule follow-up conversation to continue planning  [] Referred individual to Provider for additional questions/concerns   [] Advised patient/agent/surrogate to review completed ACP document and update if needed with changes in condition, patient preferences or care setting    [x] This note routed to one or more involved healthcare providers      Electronically signed by DAPHNE Momin, REBECCA on 10/21/2020 at 10:58 AM

## 2020-10-21 NOTE — ED NOTES
Report called to Tanika Perea RN on 5N. All questions answered.      Pierre Dangelo RN  10/21/20 8518

## 2020-10-21 NOTE — PROGRESS NOTES
4 Eyes Skin Assessment     NAME:  Brooks Glynn OF BIRTH:  1935  MEDICAL RECORD NUMBER:  [id number]    The patient is being assess for  Admission    I agree that 2 RN's have performed a thorough Head to Toe Skin Assessment on the patient. ALL assessment sites listed below have been assessed. Areas assessed by both nurses:    Head, Face, Ears, Shoulders, Back, Chest, Arms, Elbows, Hands, Sacrum. Buttock, Coccyx, Ischium and Legs. Feet and Heels        Does the Patient have a Wound?  No noted wound(s)       Michael Prevention initiated:  Yes   Wound Care Orders initiated:  No    Pressure Injury (Stage 3,4, Unstageable, DTI, NWPT, and Complex wounds) if present place consult order under [de-identified] No    New and Established Ostomies if present place consult order under : No      Nurse 1 eSignature: Electronically signed by Jed Cerda RN on 10/21/20 at 6:53 AM EDT    **SHARE this note so that the co-signing nurse is able to place an eSignature**    Nurse 2 eSignature: Electronically signed by Kristine Delaney RN on 10/21/20 at 7:03 AM EDT

## 2020-10-21 NOTE — DISCHARGE INSTR - COC
Continuity of Care Form    Patient Name: Dino Serna   :  1935  MRN:  [id number]    Admit date:  10/20/2020  Discharge date:  ***    Code Status Order: Full Code   Advance Directives:   Advance Care Flowsheet Documentation     Date/Time Healthcare Directive Type of Healthcare Directive Copy in [address] Agent's Name Healthcare Agent's Phone Number    10/21/20 0408  Yes, patient has an advance directive for healthcare treatment  Durable power of  for health care  Yes, copy in chart  --  --  --          Admitting Physician:  Eliott Favre, DO  PCP: Harlan Hunt MD    Discharging Nurse: [facility] Unit/Room#: R5S-3291/3879-49  Discharging Unit Phone Number: ***    Emergency Contact:   Extended Emergency Contact Information  Primary Emergency Contact: [address] Phone: [phone]  Mobile Phone: [phone]  Relation: Child    Past Surgical History:  Past Surgical History:   Procedure Laterality Date    CATARACT REMOVAL      CHOLECYSTECTOMY         Immunization History:   Immunization History   Administered Date(s) Administered    Influenza Virus Vaccine 10/01/2014    Influenza, High Dose (Fluzone 65 yrs and older) 2015, 10/14/2016, 2017, 2018    Influenza, Quadv, adjuvanted, 65 yrs +, IM, PF (Fluad) 10/07/2020    Influenza, Triv, inactivated, subunit, adjuvanted, IM (Fluad 65 yrs and older) 10/08/2019    Pneumococcal Conjugate 13-valent ([id number]) 2015    Pneumococcal Polysaccharide ([id number]) 2014    Td, unspecified formulation 2014    Zoster Live (Zostavax) 2013       Active Problems:  Patient Active Problem List   Diagnosis Code    Sprain of neck S13. 9XXA    Sciatica M54.30    Cervical spondylosis without myelopathy M47.812    Degeneration of cervical intervertebral disc M50.30    Cervical radiculitis M54.12    Chronic pain syndrome G89.4    Stroke ([facility] [address] I63.9    Postmenopausal Z78.0    COPD, severe (HCC) J44.9    HTN (hypertension) I10    Acquired hypothyroidism E03.9    Hypertriglyceridemia E78.1    Itching L29.9    Anxiety F41.9    Coronary artery disease involving native coronary artery of native heart with angina pectoris ([facility]) I25.119    Rash R21    Primary osteoarthritis of right knee M17.11    Age-related osteoporosis without current pathological fracture M81.0    Diabetes mellitus (HCC) E11.9    COVID-19 U07.1    Pneumonia J18.9    CHF exacerbation ([facility]) I50.9    COPD exacerbation ([facility]) J44.1    Acute on chronic respiratory failure ([facility]) J96.20    Sepsis ([facility]) A41.9    Edema of right lower extremity R60.0       Isolation/Infection:   Isolation          Droplet Plus        Patient Infection Status     Infection Onset Added Last Indicated Last Indicated By Review Planned Expiration Resolved Resolved By    COVID-19 10/21/20 10/21/20 10/21/20 COVID-19 10/28/20 11/04/20      Resolved    COVID-19 Rule Out 10/20/20 10/20/20 10/21/20 COVID-19 (Ordered)   10/21/20 Rule-Out Test Resulted          Nurse Assessment:  Last Vital Signs: /68   Pulse 74   Temp 98.8 °F (37.1 °C) (Oral)   Resp 18   Ht 5' 6\" (1.676 m)   Wt 186 lb 4.6 oz (84.5 kg)   SpO2 97%   BMI 30.07 kg/m²     Last documented pain score (0-10 scale): Pain Level: 0  Last Weight:   Wt Readings from Last 1 Encounters:   10/21/20 186 lb 4.6 oz (84.5 kg)     Mental Status:  {IP PT MENTAL STATUS:20030}    IV Access:  { SHALINI IV ACCESS:703969024}    Nursing Mobility/ADLs:  Walking   {P DME VEDS:845006899}  Transfer  {P DME ECXI:517521908}  Bathing  {CHP DME HYRQ:271973588}  Dressing  {CHP DME XCQI:307783934}  Toileting  {P DME VRIW:577234800}  Feeding  {P DME IGGU:404158839}  Med Admin  {P DME AGUV:016015000}  Med Delivery   {[facility] MED Delivery:960601815}    Wound Care Documentation and Therapy:  Wound 10/21/20 Sacrum Right stage 1 (Active)   Wound Length

## 2020-10-21 NOTE — CARE COORDINATION
INITIAL CASE MANAGEMENT ASSESSMENT    Reviewed chart, unable to meet with patient due to isolation status. Call to patient's room, spoke with patient over the phone to assess possible discharge needs. Explained Case Management role/services. Living Situation: updated address: [address], [facility] - patient lives with her daughter and son in law    ADLs: independent, patient reports she does get dizzy at times and her dtg is able to assist her     DME: walker, cane, wc, shower chair, beside commode     PT/OT Recs: not ordered     Active Services: none reported     Transportation: does not drive, daughter drives to appointments and will likely  at discharge     Medications: Pharmacy: [facility] or [facility] in [place], no issues obtaining or taking medications    PCP: confirmed Charleen Severino MD       HD/PD: n/a    PLAN/COMMENTS: plan is home with family support, follow for needs, patient reports her daughter and LOBO are getting tested for Covid today    CM provided contact information for patient or family to call with any questions. CM will follow and assist as needed.     Electronically signed by DAPHNE Stacy, REBECCA on 10/21/2020 at 12:03 PM

## 2020-10-21 NOTE — CONSULTS
Patient is seen at the request of Dr. Kevin Anguiano for COVID; respiratory failure    PCP: Yoan Garcia MD    HISTORY OF PRESENT ILLNESS: 81y.o. year old female with asthma/COPD and ILD who was admitted on 10/20/20 for shortness of breath. She says a few days ago she developed a severe non-productive cough. Yesterday she developed shortness of breath prompting EMS to be called. Apparently when EMS arrived she was hypoxic to 83%. Test for the coronavirus has returned positive.     PAST MEDICAL HISTORY:  Past Medical History:   Diagnosis Date    Arthritis     COPD (chronic obstructive pulmonary disease) ([facility] [address] [facility]     Heart disease     Hypertension     Lung disease     Renal insufficiency     Stroke ([address] 2012       PAST SURGICAL HISTORY:  Past Surgical History:   Procedure Laterality Date    CATARACT REMOVAL      CHOLECYSTECTOMY           MEDICATIONS:  Current Facility-Administered Medications: sodium chloride flush 0.9 % injection 10 mL, 10 mL, Intravenous, 2 times per day  sodium chloride flush 0.9 % injection 10 mL, 10 mL, Intravenous, PRN  acetaminophen (TYLENOL) tablet 650 mg, 650 mg, Oral, Q6H PRN **OR** acetaminophen (TYLENOL) suppository 650 mg, 650 mg, Rectal, Q6H PRN  enoxaparin (LOVENOX) injection 30 mg, 30 mg, Subcutaneous, BID  aspirin EC tablet 81 mg, 81 mg, Oral, Daily  atorvastatin (LIPITOR) tablet 80 mg, 80 mg, Oral, Daily  levothyroxine (SYNTHROID) tablet 100 mcg, 100 mcg, Oral, Daily  metoprolol tartrate (LOPRESSOR) tablet 25 mg, 25 mg, Oral, BID  pantoprazole (PROTONIX) tablet 20 mg, 20 mg, Oral, Daily  pregabalin (LYRICA) capsule 50 mg, 50 mg, Oral, Daily  tiotropium (SPIRIVA RESPIMAT) 2.5 MCG/ACT inhaler 2 puff, 2 puff, Inhalation, Daily  ALPRAZolam (XANAX) tablet 1 mg, 1 mg, Oral, BID PRN  perflutren lipid microspheres (DEFINITY) injection 1.65 mg, 1.5 mL, Intravenous, ONCE PRN  ondansetron (ZOFRAN) injection 4 mg, 4 mg, Intravenous, Q6H PRN  [START ON 10/22/2020] azithromycin (ZITHROMAX) 500 mg in D5W 250ml addavial, 500 mg, Intravenous, Q24H **AND** [START ON 10/22/2020] cefTRIAXone (ROCEPHIN) 1 g IVPB in 50 mL D5W minibag, 1 g, Intravenous, Q24H  guaiFENesin-dextromethorphan (ROBITUSSIN DM) 100-10 MG/5ML syrup 5 mL, 5 mL, Oral, Q4H PRN  dexamethasone (DECADRON) tablet 6 mg, 6 mg, Oral, Daily  furosemide (LASIX) injection 20 mg, 20 mg, Intravenous, BID  lisinopril (PRINIVIL;ZESTRIL) tablet 5 mg, 5 mg, Oral, Daily      ALLERGIES:  Patient is allergic to acetaminophen-codeine and codeine. FAMILY HISTORY:  family history includes Cancer in her brother, mother, and sister; Heart Attack in her brother; Heart Disease in her brother; Tuberculosis in her father. SOCIAL HISTORY:  Social History     Socioeconomic History    Marital status:       Spouse name: Not on file    Number of children: 1    Years of education: Not on file    Highest education level: Not on file   Occupational History    Not on file   Social Needs    Financial resource strain: Not on file    Food insecurity     Worry: Not on file     Inability: Not on file    Transportation needs     Medical: Not on file     Non-medical: Not on file   Tobacco Use    Smoking status: Former Smoker     Last attempt to quit: 1982     Years since quittin.8    Smokeless tobacco: Never Used   Substance and Sexual Activity    Alcohol use: No     Alcohol/week: 0.0 standard drinks    Drug use: No    Sexual activity: Not on file   Lifestyle    Physical activity     Days per week: Not on file     Minutes per session: Not on file    Stress: Not on file   Relationships    Social connections     Talks on phone: Not on file     Gets together: Not on file     Attends Nondenominational service: Not on file     Active member of club or organization: Not on file     Attends meetings of clubs or organizations: Not on file     Relationship status: Not on file    Intimate partner violence     Fear of current or ex partner: Not on file 137   K 3.8 3.9   CL 97* 95*   CO2 29 32   BUN 13 13   CREATININE 0.9 1.0     LIVER PROFILE:   Recent Labs     10/20/20  2227 10/21/20  0551   AST 18 16   ALT 12 12   BILITOT <0.2 <0.2   ALKPHOS 79 83     PT/INR:   Recent Labs     10/21/20  0551   PROTIME 12.6   INR 1.09     APTT:   Recent Labs     10/21/20  0551   APTT 24.5     Images and reports from chest imaging were reviewed by me. My interpretation is:  CXR (10/20/20): Opacity over bilateral lower lung zones  Chest CT (10/21/20): No LAD; ground glass opacities bilaterally; area of left upper lobe consolidation; patchy consolidation in bilateral lower lung zones      ECHO (3/19/12- [facility] records)  Summary:  Shantanu Travis left ventricular ejection fraction is estimated to be 55-60%.   Left ventricular systolic function is normal. (An ejection fraction    >or= to 55% is considered normal)    The diastolic function is impaired and classified as Grade 1    (impaired relaxation).   There is trace mitral regurgitation. Assessment:     COVID-19 pneumonia  Left upper lobe pneumonia  Chronic hypoxic respiratory failure (on 2L NC)  Asthma/COPD  Interstitial lung disease    Plan:      COVID-19 pneumonia  -Decadron 6mg daily    Left upper lobe pneumonia  -Ceftriaxone and azithromycin  -Send urine strep and legionella antigens  -Send sputum culture    Chronic hypoxic respiratory failure (on 2L NC)  -Continue supplemental oxygen to keep oxygen saturation greater than 90%    Asthma/COPD  -Decadron daily   -Symbicort twice daily  -Combivent 4 times daily    Interstitial lung disease  -Thought to be nonspecific interstitial pneumonia. She follows with Dr. Myra Jeffery Steroids as above      Thank you for allowing me to participate in the care of this patient. Will follow.      Joaquina Berry MD  [facility] Pulmonary, Critical Care and Sleep

## 2020-10-22 NOTE — PROGRESS NOTES
Progress Note  Admit Date: 10/20/2020      PCP: Dana Rico MD     CC: F/U for COVID    Days in hospital:  1    SUBJECTIVE / Interval History:  Patient feels okay. Feels close to baseline. No complaints other than some cough  Needing 2 to 3 L of oxygen  Spiked a temp    -3 L        Allergies  Acetaminophen-codeine and Codeine    Medications    Scheduled Meds:   sodium chloride flush  10 mL Intravenous 2 times per day    enoxaparin  30 mg Subcutaneous BID    aspirin  81 mg Oral Daily    atorvastatin  80 mg Oral Daily    levothyroxine  100 mcg Oral Daily    metoprolol tartrate  25 mg Oral BID    pantoprazole  20 mg Oral Daily    azithromycin  500 mg Intravenous Q24H    And    cefTRIAXone (ROCEPHIN) IV  1 g Intravenous Q24H    dexamethasone  6 mg Oral Daily    lisinopril  5 mg Oral Daily    amLODIPine  2.5 mg Oral Daily    isosorbide mononitrate  60 mg Oral Daily    montelukast  10 mg Oral Nightly    insulin lispro  0-12 Units Subcutaneous TID WC    insulin lispro  0-6 Units Subcutaneous Nightly    ranolazine  1,000 mg Oral BID    carBAMazepine  400 mg Oral BID    psyllium  1 packet Oral Daily    budesonide-formoterol  2 puff Inhalation BID    albuterol-ipratropium  1 puff Inhalation 4x Daily    pregabalin  50 mg Oral Nightly     Continuous Infusions:   dextrose         PRN Meds:  sodium chloride flush, acetaminophen **OR** acetaminophen, ALPRAZolam, perflutren lipid microspheres, ondansetron, guaiFENesin-dextromethorphan, albuterol sulfate HFA, oxyCODONE-acetaminophen, glucose, dextrose, glucagon (rDNA), dextrose    Vitals    BP (!) 122/58   Pulse 89   Temp 100.2 °F (37.9 °C) (Oral)   Resp 18   Ht 5' 6\" (1.676 m)   Wt 186 lb 8.2 oz (84.6 kg)   SpO2 92%   BMI 30.10 kg/m²     Exam:    Gen: No distress. Eyes: PERRL. No sclera icterus. No conjunctival injection. ENT: No discharge. Pharynx clear. External appearance of ears and nose normal.  Neck: Trachea midline. No obvious mass. Resp: No accessory muscle use. No crackles. No wheezes. No rhonchi. No dullness on percussion. CV: Regular rate. Regular rhythm. No murmur or rub. Trace edema. GI: Non-tender. Non-distended. No hernia. Skin: Warm, dry, normal texture and turgor. No nodule on exposed extremities. Lymph: No cervical LAD. No supraclavicular LAD. M/S: No cyanosis. No clubbing. No joint deformity. Neuro: Moves all four extremities. CN 2-12 tested, no defect noted. Psych: Oriented x 3. No anxiety. Awake. Alert. Intact judgement and insight. Data    LABS  CBC:   Recent Labs     10/20/20  2227 10/21/20  0551 10/22/20  0548   WBC 8.4 11.6* 8.3   HGB 10.1* 9.6* 9.7*   HCT 31.5* 30.3* 29.9*   MCV 87.8 87.2 86.1    164 168     BMP:   Recent Labs     10/20/20  2227 10/21/20  0551 10/22/20  0548    137 139   K 3.8 3.9 3.3*   CL 97* 95* 98*   CO2 29 32 31   BUN 13 13 19   CREATININE 0.9 1.0 1.0   GLUCOSE 133* 270* 100*     POC GLUCOSE:    Recent Labs     10/21/20  1119 10/21/20  1650 10/21/20  2137 10/22/20  0809   POCGLU 261* 90 119* 123*     LIVER PROFILE:   Recent Labs     10/20/20  2227 10/21/20  0551 10/22/20  0548   AST 18 16 16   ALT 12 12 11   LABALBU 3.5 3.5 3.2*   BILITOT <0.2 <0.2 <0.2   ALKPHOS 79 83 72     PT/INR:   Recent Labs     10/21/20  0551 10/22/20  0548   PROTIME 12.6 12.3   INR 1.09 1.06     APTT:   Recent Labs     10/21/20  0551 10/22/20  0548   APTT 24.5 25.9     UA:No results for input(s): NITRITE, COLORU, PHUR, LABCAST, WBCUA, RBCUA, MUCUS, TRICHOMONAS, YEAST, BACTERIA, CLARITYU, SPECGRAV, LEUKOCYTESUR, UROBILINOGEN, BILIRUBINUR, BLOODU, GLUCOSEU, KETUA, AMORPHOUS in the last 72 hours. Microbiology:  Wound Culture: No results for input(s): WNDABS, ORG in the last 72 hours. Invalid input(s):  LABGRAM  Nasal Culture: No results for input(s): ORG, MRSAPCR in the last 72 hours. Blood Culture:   Recent Labs     10/20/20  2344   BC No Growth to date. Any change in status will be called. BLOODCULT2 No Growth to date. Any change in status will be called. Fungal Culture:   No results for input(s): FUNGSM in the last 72 hours. No results for input(s): FUNCXBLD in the last 72 hours. CSF Culture:  No results for input(s): COLORCSF, APPEARCSF, CFTUBE, CLOTCSF, WBCCSF, RBCCSF, NEUTCSF, NUMCELLSCSF, LYMPHSCSF, MONOCSF, GLUCCSF, VOLCSF in the last 72 hours. Respiratory Culture:  No results for input(s): Dasie Watts in the last 72 hours. AFB:No results for input(s): AFBSMEAR in the last 72 hours. Urine Culture  No results for input(s): LABURIN in the last 72 hours. RADIOLOGY:    VL Extremity Venous Bilateral   Final Result      CT CHEST PULMONARY EMBOLISM W CONTRAST   Final Result   Negative for acute pulmonary embolism. Multifocal ground-glass and consolidative airspace disease is suspicious for   infection, including atypical and viral pathogens (including COVID-19). Mild interstitial pulmonary edema. Mild subpleural reticular abnormality and bronchiolectasis, suggesting   underlying interstitial lung disease. Mild mediastinal and hilar lymphadenopathy, presumably reactive. XR CHEST PORTABLE   Final Result   Features of heart failure, with moderate interstitial pulmonary edema. Basilar opacities, favor atelectasis. Superimposed pneumonia or aspiration   could be present in the appropriate context.              CONSULTS:    IP CONSULT TO DIETITIAN  IP CONSULT TO CARDIOLOGY  IP CONSULT TO INFECTIOUS DISEASES  IP CONSULT TO PULMONOLOGY    ASSESSMENT AND PLAN:      Active Problems:    HTN (hypertension)    Hypertriglyceridemia    Coronary artery disease involving native coronary artery of native heart with angina pectoris (HCC)    Diabetes mellitus ([address]    COVID-19    Pneumonia    CHF exacerbation ([address]    COPD exacerbation ([address]    Acute on chronic respiratory failure ([address]    Sepsis ([address]    Edema of right lower extremity    Pneumonia due to COVID-19 virus Chronic respiratory failure with hypoxia (HCC)    Asthma-COPD overlap syndrome (HCC)    Interstitial lung disease ([facility]  Resolved Problems:    * No resolved hospital problems. *    Patient is a 25-year-old female with a past medical history of COPD, chronic respiratory failure, coronary artery disease, hypertension, previous CVA and CHF who presented with worsening shortness of breath and hypoxia. Patient was found to be hypoxic in the low 80s on 4 L of oxygen on admission. There was some concern of possible CHF and patient was diuresed. Her oxygen requirement has improved. She started on dexamethasone. Acute on chronic hypoxic respiratory failure  -Secondary to COVID-19  -Wean oxygen as tolerated. Uses 2 L of oxygen    COVID-19 pneumonia  -Started on Decadron  -Infectious disease consult  -Patient is stable on 2-3  L of oxygen. If she deteriorates will give convalescent plasma and remdesivir    Possible bacterial pneumonia  -Procalcitonin elevated on admission. Treat as gram-negative/atypical day2   -Continue antibiotics    COPR  -ct inhalers    Coronary artery disease  -Continue home medications    Possible CHF-clinically seems unlikely- echo pending   -Patient had some pitting edema on admission with interstitial edema on CT  -Got a dose of Lasix, will defer to cardiology  -Patient's baseline weight documented as about 193 pounds. Patient is below her dry weight at present    DM2  -Diet controlled. Last A1c 6.5  -Sliding scale insulin    HTN  - ct home meds    Anxiety  -Resume home meds    Chronic pain  -Continue home medication      DVT Prophylaxis: lovenox  Diet: DIET LOW SODIUM 2 GM; 1500 ml  Code Status: Full Code        Discharge plan -most likely tomorrow. Echo pending.   If patient's oxygen requirement is stable she should be able to go home    The patient and / or the family were informed of the results of any tests, a time was given to answer questions, a plan was proposed and they agreed with plan. Discussed with consulting physicians, nursing and social work     The note was completed using EMR. Every effort was made to ensure accuracy; however, inadvertent computerized transcription errors may be present.        David Valencia MD

## 2020-10-22 NOTE — PROGRESS NOTES
[facility]   Daily Progress Note      Admit Date:  10/20/2020    CC: SOB    HPI:   Elisabet Mane is a 81 y.o. female with PMH COPD home O2 2L NC, CAD s/p RCA stent>in stent restenois with repeat stenting (Followed by [facility]), HTN, CKD, CVA, iron deficiency anemia. Remote smoking hz. Admitted to [facility] with Covid + PNA with hypoxic resp failure. Her presenting symptoms were SOB associated with arm and jaw pain with exertion. Dr. Kevin Francisco consulted yesterday. She was given small dose of lasix for pulm edema. ECHO was completed and does not show heart failure. Today she is OOB in chair. Continues with SOB, worsens with activity, improves with rest. Associated with moist cough. Denies CP, LH, dizzy, syncope, palpitations. No edema. Review of Systems:   General: Denies fever, chills,+ fatigue,+ generalized weakness  Skin: Denies skin changes, rash, itching, lesions.   HEENT: Denies headache, dizziness, vision changes, nosebleeds, sore throat, nasal drainage  RESP: See HPI  CARD: Denies palpitations,  murmur  GI:Denies nausea, vomiting, heartburn, loss of appetite, change in bowels  : Denies frequency, pain, incontinence, polyuria  VASC: Denies claudication, leg cramps, clots  MUSC/SKEL: Denies pain, stiffness, arthritis  PSYCH: Denies anxiety, depression, stress  NEURO: Denies numbness, tingling, weakness,change in mood or memory  HEME: Denies abn bruising, bleeding, anemia  ENDO: Denies intolerance to heat, cold, excessive thirst or hunger, hx thyroid disease    Objective:   /62   Pulse 77   Temp 98.5 °F (36.9 °C) (Oral)   Resp 16   Ht 5' 6\" (1.676 m)   Wt 186 lb 8.2 oz (84.6 kg)   SpO2 91%   BMI 30.10 kg/m²           Intake/Output Summary (Last 24 hours) at 10/22/2020 2425  Last data filed at 10/22/2020 0344  Gross per 24 hour   Intake 1070 ml   Output 1325 ml   Net -255 ml     I/O since adm: Neg 3L    WEIGHT:Admit Weight: 198 lb 6.6 oz (90 kg)         Today Data: I have reviewed all labs below today. CBC:   Recent Labs     10/20/20  2227 10/21/20  0551 10/22/20  0548   WBC 8.4 11.6* 8.3   HGB 10.1* 9.6* 9.7*   HCT 31.5* 30.3* 29.9*   MCV 87.8 87.2 86.1    164 168     BMP:   Recent Labs     10/20/20  2227 10/21/20  0551 10/22/20  0548    137 139   K 3.8 3.9 3.3*   CL 97* 95* 98*   CO2 29 32 31   BUN 13 13 19   CREATININE 0.9 1.0 1.0     GLUCOSE:   Recent Labs     10/20/20  2227 10/21/20  0551 10/22/20  0548   GLUCOSE 133* 270* 100*     LIVER PROFILE:   Lab Results   Component Value Date    AST 16 10/22/2020    ALT 11 10/22/2020    LIPASE 28 07/18/2011    AMYLASE 51 07/18/2011    LABALBU 3.2 10/22/2020    BILIDIR 0.11 08/07/2012    BILITOT <0.2 10/22/2020    ALKPHOS 72 10/22/2020     PT/INR:   Lab Results   Component Value Date    PROTIME 12.3 10/22/2020    INR 1.06 10/22/2020     APTT:   Lab Results   Component Value Date    APTT 25.9 10/22/2020     Pro-BNP:    Lab Results   Component Value Date    PROBNP 901 10/20/2020    PROBNP 180 06/23/2016     Parameters:   > 450 pg/mL under age 48  > 900 pg/mL ages 54-65  > 1800 pg/mL over age 76    ENZYMES:  Lab Results   Component Value Date    CKTOTAL 53 11/11/2014    CKMB 0.97 02/12/2010    TROPONINI <0.01 10/20/2020     FASTING LIPID PANEL:  Lab Results   Component Value Date    CHOL 187 04/16/2019    HDL 42 04/16/2019    HDL 60 04/25/2012    LDLCALC see below 04/16/2019    TRIG 405 04/16/2019       Diagnostics:    EKG: Sinus tachycardia with Premature atrial complexes  with wandering baselineLow voltage QRSDelayed transitionAbnormal ECGWhen compared with ECG of 15-MAR-2010 10:37,No significant change was foundConfirmed by [address] ([id number]) on 10/21/2020 11:53:35 AM     ECHO: 10/22/20   Summary   Technically difficult examination. Left ventricular cavity size is normal.   Normal left ventricular wall thickness. Ejection fraction is visually estimated to be low normal at 50-55%.    No regional wall motion abnormalities are noted. Normal diastolic function. The right ventricle is not well visualized. Cardiac Angiography:   10/20/2018  IMPRESSION:  1. Nonobstructive CAD, widely patent RCA MULU  2. Normal LV systolic function  3. Severe systemic hypertension  4. Elevated LVEDP   5. Sheath secured into position     RECOMMENDATION:  1. Titrate antianginal therapy with addition of Ranexa  2. Aggressive risk factor modification to secondary prevention   targets  3. BP control   4. Care of the vascular access site per the routine for femoral   (diagnostic) access post closure device    2/6/2014 [facility]  Findings/Summary:   CORONARY ANGIOGRAPHY FINDINGS    DOMINANCE:  Right dominant        LEFT MAIN: Normal                       LEFT ANTERIOR DESCENDING:  Luminal irregularities and   Stenotic 60% proximal small first diagonal branch                 LEFT CIRCUMFLEX: Luminal irregularities                       RIGHT CORONARY:  Stenotic and Luminal   irregularities    60% mid   80% ostial small RV marginal   which arises from the stented segment of the right coronary   this vessel is less than 1-1/2 mm in diameter      LEFT VENTRICULAR FUNCTION:        LVEF: 65%        LVEDP: Normal pre-angio        LV Systolic Pressure: Normal         LV to AO Gradient: none         LV Wall Motion:  Normal,      .        MITRAL VALVE: No mitral insufficiency     Bilateral venous duplex 10/21/20:   Summary          No evidence of deep vein or superficial vein thrombosis involving the right     lower extremity.     No evidence of deep vein or superficial vein thrombosis involving the left     lower extremity. Chest CT 10/21/20:  Impression    Negative for acute pulmonary embolism.         Multifocal ground-glass and consolidative airspace disease is suspicious for    infection, including atypical and viral pathogens (including COVID-19).          Mild interstitial pulmonary edema.         Mild subpleural reticular abnormality and bronchiolectasis, suggesting    underlying interstitial lung disease.         Mild mediastinal and hilar lymphadenopathy, presumably reactive. CXR 10/20/20:  Impression    Features of heart failure, with moderate interstitial pulmonary edema. Basilar opacities, favor atelectasis.  Superimposed pneumonia or aspiration    could be present in the appropriate context. Assessment/Plan:    1.) Acute resp failure: ECHO without heart failure. SOB and pulm edema multifactorial with COPD and active COVID PNA. Will defer current tx to primary team.     2. ) CAD S/P stent to RCA: Stable  DAPT: asa  Beta Blocker: lopressor  ACEi/ARB: lisinopril  Anti anginal: Imdur, ranexa  Lipid management/high intensity statin: lipitor  Risk factor management: high blood pressure, high cholesterol, Diabetes, smoking, obesity, family hx  Lifestyle modification: Heart healthy diet, regular exercise, weight loss, smoking cessation, stress reduction    Patient is stable from cardiology standpoint and will sign off. Patient would like to F/U with I for cardiac care. F/U appointment to be made.      Electronically signed by RENATA Shultz CNP on 10/22/2020 at 7:58 AM

## 2020-10-22 NOTE — PLAN OF CARE
Problem: Nutrition  Goal: Optimal nutrition therapy  Outcome: Ongoing     Nutrition Problem #1: Increased nutrient needs  Intervention: Food and/or Nutrient Delivery: Continue Current Diet, Start Oral Nutrition Supplement  Nutritional Goals: consume >50% of meals and supplements this admission.

## 2020-10-22 NOTE — PLAN OF CARE
Problem: Falls - Risk of:  Goal: Will remain free from falls  Description: Will remain free from falls  10/22/2020 0010 by Krystyna Griffin RN  Outcome: Ongoing  10/21/2020 1818 by Lynda Pink RN  Outcome: Ongoing  Goal: Absence of physical injury  Description: Absence of physical injury  10/22/2020 0010 by Krystyna Griffin RN  Outcome: Ongoing  10/21/2020 1818 by Lynda Pink RN  Outcome: Ongoing     Problem: Airway Clearance - Ineffective  Goal: Achieve or maintain patent airway  10/22/2020 0010 by Krystyna Griffin RN  Outcome: Ongoing  10/21/2020 1818 by Lynda Pink RN  Outcome: Ongoing

## 2020-10-22 NOTE — CARE COORDINATION
SW reviewed chart again and notes that ACP is complete, however, patient is currently breathing on oxygen. We will continue to monitor for needs until discharge or until patient is on room air. Respectfully submitted,    KARINE Caruso  [facility]   [phone]     Electronically signed by KARINE Montenegro on 10/22/2020 at 9:52 AM

## 2020-10-22 NOTE — CARE COORDINATION
ABDELRAHMAN placed phone call to patient's room to discuss advanced care planning, however, nurse Abhishek answered the line and reports that she's in the restroom. ABDELRAHMAN will try again later if time permits. Respectfully submitted,    Symone SERNA, KARINE  [facility]   [phone]    Electronically signed by KARINE Moreno on 10/22/2020 at 9:50 AM

## 2020-10-22 NOTE — PLAN OF CARE
Problem: Falls - Risk of:  Goal: Will remain free from falls  Outcome: Ongoing  Goal: Absence of physical injury  Outcome: Ongoing     Problem: Airway Clearance - Ineffective  Goal: Achieve or maintain patent airway  Outcome: Ongoing     Problem: Gas Exchange - Impaired  Goal: Absence of hypoxia  Outcome: Ongoing  Goal: Promote optimal lung function  Outcome: Ongoing     Problem: Breathing Pattern - Ineffective  Goal: Ability to achieve and maintain a regular respiratory rate  Outcome: Ongoing     Problem:  Body Temperature -  Risk of, Imbalanced  Goal: Ability to maintain a body temperature within defined limits  Outcome: Ongoing  Goal: Will regain or maintain usual level of consciousness  Outcome: Ongoing  Goal: Complications related to the disease process, condition or treatment will be avoided or minimized  Outcome: Ongoing     Problem: Isolation Precautions - Risk of Spread of Infection  Goal: Prevent transmission of infection  Outcome: Ongoing     Problem: Nutrition Deficits  Goal: Optimize nutrtional status  Outcome: Ongoing     Problem: Risk for Fluid Volume Deficit  Goal: Maintain normal heart rhythm  Outcome: Ongoing  Goal: Maintain absence of muscle cramping  Outcome: Ongoing  Goal: Maintain normal serum potassium, sodium, calcium, phosphorus, and pH  Outcome: Ongoing     Problem: Loneliness or Risk for Loneliness  Goal: Demonstrate positive use of time alone when socialization is not possible  Outcome: Ongoing     Problem: Fatigue  Goal: Verbalize increase energy and improved vitality  Outcome: Ongoing     Problem: Patient Education: Go to Patient Education Activity  Goal: Patient/Family Education  Outcome: Ongoing     Problem: OXYGENATION/RESPIRATORY FUNCTION  Goal: Patient will maintain patent airway  Outcome: Ongoing  Goal: Patient will achieve/maintain normal respiratory rate/effort  Outcome: Ongoing     Problem: HEMODYNAMIC STATUS  Goal: Patient has stable vital signs and fluid balance  Outcome: Ongoing     Problem: FLUID AND ELECTROLYTE IMBALANCE  Goal: Fluid and electrolyte balance are achieved/maintained  Outcome: Ongoing     Problem: ACTIVITY INTOLERANCE/IMPAIRED MOBILITY  Goal: Mobility/activity is maintained at optimum level for patient  Outcome: Ongoing

## 2020-10-22 NOTE — PROGRESS NOTES
Pulmonary Progress Note    CC: COVID-19 pneumonia  Left upper lobe pneumonia  Chronic hypoxic respiratory failure (on 2L NC)  Asthma/COPD  Interstitial lung disease    24 hr events:  Low-grade temperature of 100.2 °F this morning. She reports a cough productive of white mucus. She is on 3 L of oxygen. ROS:  No SOB  +cough  No vomiting       PHYSICAL EXAM:  Blood pressure (!) 104/58, pulse 74, temperature 98.4 °F (36.9 °C), temperature source Oral, resp. rate 18, height 5' 6\" (1.676 m), weight 186 lb 8.2 oz (84.6 kg), SpO2 94 %, not currently breastfeeding. on 3L NC    Intake/Output Summary (Last 24 hours) at 10/22/2020 1453  Last data filed at 10/22/2020 1207  Gross per 24 hour   Intake 1070 ml   Output 825 ml   Net 245 ml       Gen: Well developed; well nourished  Eyes: No scleral icterus. No conjunctival injection. ENT: External appearance of ears and nose normal.  Neck: Trachea midline. No obvious mass. No visible thyroid enlargement    Respiratory:  Expiratory wheezes and rhonchi bilaterally, no accessory muscle use  Cardiovascular: Regular rate and rhythm, no appreciable murmurs. No edema. Skin: Warm and dry. No rashes or ulcers on visible areas. Normal texture and turgor  Musculoskeletal: No cyanosis, clubbing or joint deformity.     Psychiatric: Normal mood and affect; exhibits normal insight and judgement     Medications:  Current Facility-Administered Medications: sodium chloride flush 0.9 % injection 10 mL, 10 mL, Intravenous, 2 times per day  sodium chloride flush 0.9 % injection 10 mL, 10 mL, Intravenous, PRN  acetaminophen (TYLENOL) tablet 650 mg, 650 mg, Oral, Q6H PRN **OR** acetaminophen (TYLENOL) suppository 650 mg, 650 mg, Rectal, Q6H PRN  enoxaparin (LOVENOX) injection 30 mg, 30 mg, Subcutaneous, BID  aspirin EC tablet 81 mg, 81 mg, Oral, Daily  atorvastatin (LIPITOR) tablet 80 mg, 80 mg, Oral, Daily  levothyroxine (SYNTHROID) tablet 100 mcg, 100 mcg, Oral, Daily  metoprolol tartrate (LOPRESSOR) tablet 25 mg, 25 mg, Oral, BID  pantoprazole (PROTONIX) tablet 20 mg, 20 mg, Oral, Daily  ALPRAZolam (XANAX) tablet 1 mg, 1 mg, Oral, BID PRN  perflutren lipid microspheres (DEFINITY) injection 1.65 mg, 1.5 mL, Intravenous, ONCE PRN  ondansetron (ZOFRAN) injection 4 mg, 4 mg, Intravenous, Q6H PRN  azithromycin (ZITHROMAX) 500 mg in D5W 250ml addavial, 500 mg, Intravenous, Q24H **AND** cefTRIAXone (ROCEPHIN) 1 g IVPB in 50 mL D5W minibag, 1 g, Intravenous, Q24H  guaiFENesin-dextromethorphan (ROBITUSSIN DM) 100-10 MG/5ML syrup 5 mL, 5 mL, Oral, Q4H PRN  dexamethasone (DECADRON) tablet 6 mg, 6 mg, Oral, Daily  lisinopril (PRINIVIL;ZESTRIL) tablet 5 mg, 5 mg, Oral, Daily  albuterol sulfate  (90 Base) MCG/ACT inhaler 2 puff, 2 puff, Inhalation, Q6H PRN  amLODIPine (NORVASC) tablet 2.5 mg, 2.5 mg, Oral, Daily  isosorbide mononitrate (IMDUR) extended release tablet 60 mg, 60 mg, Oral, Daily  montelukast (SINGULAIR) tablet 10 mg, 10 mg, Oral, Nightly  oxyCODONE-acetaminophen (PERCOCET) 7.5-325 MG per tablet 1 tablet, 1 tablet, Oral, Q6H PRN  insulin lispro (HUMALOG) injection vial 0-12 Units, 0-12 Units, Subcutaneous, TID WC  insulin lispro (HUMALOG) injection vial 0-6 Units, 0-6 Units, Subcutaneous, Nightly  glucose (GLUTOSE) 40 % oral gel 15 g, 15 g, Oral, PRN  dextrose 50 % IV solution, 12.5 g, Intravenous, PRN  glucagon (rDNA) injection 1 mg, 1 mg, Intramuscular, PRN  dextrose 5 % solution, 100 mL/hr, Intravenous, PRN  ranolazine (RANEXA) extended release tablet 1,000 mg, 1,000 mg, Oral, BID  carBAMazepine (TEGRETOL XR) extended release tablet 400 mg, 400 mg, Oral, BID  psyllium (KONSYL) 28.3 % packet 1 packet, 1 packet, Oral, Daily  budesonide-formoterol (SYMBICORT) 160-4.5 MCG/ACT inhaler 2 puff, 2 puff, Inhalation, BID  albuterol-ipratropium (COMBIVENT RESPIMAT)  MCG/ACT inhaler 1 puff, 1 puff, Inhalation, 4x Daily  pregabalin (LYRICA) capsule 50 mg, 50 mg, Oral, Nightly    Data reviewed:  Labs:  CBC:   Recent Labs     10/20/20  2227 10/21/20  0551 10/22/20  0548   WBC 8.4 11.6* 8.3   HGB 10.1* 9.6* 9.7*   HCT 31.5* 30.3* 29.9*   MCV 87.8 87.2 86.1    164 168     BMP:   Recent Labs     10/20/20  2227 10/21/20  0551 10/22/20  0548    137 139   K 3.8 3.9 3.3*   CL 97* 95* 98*   CO2 29 32 31   BUN 13 13 19   CREATININE 0.9 1.0 1.0     LIVER PROFILE:   Recent Labs     10/20/20  2227 10/21/20  0551 10/22/20  0548   AST 18 16 16   ALT 12 12 11   BILITOT <0.2 <0.2 <0.2   ALKPHOS 79 83 72     PT/INR:   Recent Labs     10/21/20  0551 10/22/20  0548   PROTIME 12.6 12.3   INR 1.09 1.06     APTT:   Recent Labs     10/21/20  0551 10/22/20  0548   APTT 24.5 25.9     Cultures:   Blood culture (10/20): NGTD  Sputum culture (10/21): pending  Urine strep and Legionella antigens: Negative      Films:  Chest images and reports were reviewed by me. My interpretation is:  No new images    Assessment:     COVID-19 pneumonia  Left upper lobe pneumonia  Chronic hypoxic respiratory failure (on 2L NC)  Asthma/COPD  Interstitial lung disease      Plan:    COVID-19 pneumonia  -Decadron 6mg daily     Left upper lobe pneumonia  -Ceftriaxone and azithromycin (day #2)  -Follow up sputum culture     Chronic hypoxic respiratory failure (on 2L NC)  -Continue supplemental oxygen to keep oxygen saturation greater than 90%     Asthma/COPD  -Decadron daily   -Symbicort twice daily  -Combivent 4 times daily     Interstitial lung disease  -Thought to be nonspecific interstitial pneumonia. She follows with Dr. Valeriy Bauman. Steroids as above        Thank you for allowing me to participate in the care of this patient. Will follow.      Patty Olivas MD  [facility] Pulmonary, Critical Care and Sleep

## 2020-10-23 NOTE — PLAN OF CARE
Problem: Falls - Risk of:  Goal: Will remain free from falls  Description: Will remain free from falls  10/23/2020 0117 by Maximiliano Sandoval RN  Outcome: Ongoing  10/22/2020 1414 by Piedad Gomez RN  Outcome: Ongoing     Problem: Falls - Risk of:  Goal: Absence of physical injury  Description: Absence of physical injury  10/23/2020 0117 by Maximiliano Sandoval RN  Outcome: Ongoing     Problem: Airway Clearance - Ineffective  Goal: Achieve or maintain patent airway  10/23/2020 0117 by Maximiliano Sandoval RN  Outcome: Ongoing     Problem: Breathing Pattern - Ineffective  Goal: Ability to achieve and maintain a regular respiratory rate  10/23/2020 0117 by Maximiliano Sandoval RN  Outcome: Ongoing     Problem: Gas Exchange - Impaired  Goal: Promote optimal lung function  10/23/2020 0117 by Maximiliano Sandoval RN  Outcome: Ongoing     Problem: Loneliness or Risk for Loneliness  Goal: Demonstrate positive use of time alone when socialization is not possible  10/23/2020 0117 by Maximiliano Sandoval RN  Outcome: Ongoing     Problem: OXYGENATION/RESPIRATORY FUNCTION  Goal: Patient will maintain patent airway  10/23/2020 0117 by Maximiliano Sandoval RN  Outcome: Ongoing  10/22/2020 1414 by Piedad Gomez RN  Outcome: Ongoing     Problem: OXYGENATION/RESPIRATORY FUNCTION  Goal: Patient will achieve/maintain normal respiratory rate/effort  Description: Respiratory rate and effort will be within normal limits for the patient  10/23/2020 0117 by Maximiliano Sandoval RN  Outcome: Ongoing     Problem: ACTIVITY INTOLERANCE/IMPAIRED MOBILITY  Goal: Mobility/activity is maintained at optimum level for patient  10/23/2020 0117 by Maximiliano Sandoval RN  Outcome: Ongoing     Problem: Discharge Planning:  Goal: Discharged to appropriate level of care  Description: Discharged to appropriate level of care  Outcome: Ongoing

## 2020-10-23 NOTE — PROGRESS NOTES
Infectious Disease Follow up Notes  Admit Date: 10/20/2020  Hospital Day: 4    Antibiotics :   IV Levofloxacin   Dexamethasone      CHIEF COMPLAINT:       COVID-19 Pneumonia  Resp failure  COPD on home oxygen      Subjective interval History :  81 y. o. woman with  Ho COPD on home Oxygen 2 lts and CAD, HTN,CVA, CHF history admitted with acute sob + hypoxic to 83% and required 4lts nasal cannula per HPI. She has cough with sob and ongoing for x 3 days has been using her Nebulizer more often. [place] [facility] admit WBC NORMAL and Procal mild elevation, COVID 19 Tested +ve on 10/21. She has a fever 101.3 and now able to wean some on Fio2 to 2 lts. CT chest with multifocal changes consistent with COVID-19 infection  Location : cough with sob and some chest pain       Quality :  Aching pain with deep breaths     Severity : 4/10   Duration : from 2 days      Timing : intermittent   Context :   Fevers,cough and sob   Modifying factors : better with Nebulization   Associated signs and symptoms: sob, cough and fevers, blood cx in process we are consulted for recommendations.     Interval History : SOB is better and she is on nasal cannula 2LTs at base line home Oxygen level and sputum cx Stenotrophomonas noted and tolerating Dexamethasone ok cough +        Past Medical History:    Past Medical History:   Diagnosis Date    Arthritis     [facility] [address]     Heart disease     Hypertension     Lung disease     Renal insufficiency     Stroke ([facility] [address] 2012       Past Surgical History:    Past Surgical History:   Procedure Laterality Date    CATARACT REMOVAL      CHOLECYSTECTOMY         Current Medications:    Outpatient Medications Marked as Taking for the 10/20/20 encounter [facility] Encounter)   Medication Sig Dispense Refill    fluticasone-salmeterol (ADVAIR) 500-50 MCG/DOSE diskus inhaler Inhale 1 puff into the lungs every 12 hours      azithromycin (ZITHROMAX) 500 MG tablet Take 500 mg by mouth three times a week MWF      ipratropium-albuterol (DUONEB) 0.5-2.5 (3) MG/3ML SOLN nebulizer solution Inhale 1 vial into the lungs 2 times daily      benzonatate (TESSALON) 100 MG capsule Take 100 mg by mouth 3 times daily as needed for Cough      allopurinol (ZYLOPRIM) 300 MG tablet Take 1 tablet by mouth daily 90 tablet 1    oxyCODONE-acetaminophen (PERCOCET) 7.5-325 MG per tablet Take 1 tablet by mouth every 6 hours as needed for Pain (max 1-2 per day) for up to 56 days. 84 tablet 0    pregabalin (LYRICA) 50 MG capsule Take 1 capsule by mouth daily for 30 days. 90 capsule 0    levothyroxine (SYNTHROID) 100 MCG tablet TAKE ONE TABLET BY MOUTH DAILY 90 tablet 0    carBAMazepine (TEGRETOL XR) 200 MG extended release tablet Take 2 tablets by mouth 2 times daily 60 tablet 0    isosorbide mononitrate (IMDUR) 60 MG extended release tablet Take 1 tablet by mouth daily 30 tablet 1    metoprolol tartrate (LOPRESSOR) 50 MG tablet Take 0.5 tablets by mouth 2 times daily 30 tablet 1    furosemide (LASIX) 40 MG tablet Take 40 mg by mouth daily      potassium chloride (KLOR-CON M) 20 MEQ extended release tablet Take 1.5 tablets by mouth daily 135 tablet 1    Roflumilast (DALIRESP) 500 MCG tablet Take 250 mg by mouth daily Indications: at night       pantoprazole (PROTONIX) 20 MG tablet Take 20 mg by mouth daily      ranolazine (RANEXA) 1000 MG extended release tablet Take 1,000 mg by mouth 2 times daily Indications: 1 am 2 pm       atorvastatin (LIPITOR) 80 MG tablet Take 80 mg by mouth daily      amLODIPine (NORVASC) 2.5 MG tablet Take 2.5 mg by mouth daily       PREDNISONE Take 10 mg by mouth See Admin Instructions 40mg x 3 days,30mg x 3 days,20mg x 3days, then 10mg x 3 days      tiotropium (SPIRIVA) 18 MCG inhalation capsule Inhale 18 mcg into the lungs daily.       constipation, abdominal pain  Genitourinary:  negative for frequency, dysuria, urinary incontinence, hematuria  Hematologic/Lymphatic:  negative for easy bruising, bleeding and lymphadenopathy  Allergic/Immunologic:  negative for recurrent infections, angioedema, anaphylaxis   Endocrine:  negative for weight changes, polyuria, polydipsia and polyphagia  Musculoskeletal:  negative for joint  pain, swelling, decreased range of motion  Integumentary: No rashes, skin lesions  Neurological:  negative for headaches, slurred speech, unilateral weakness  Psychiatric: negative for hallucinations,confusion,agitation. PHYSICAL EXAM:      Vitals:    BP (!) 118/52   Pulse 95   Temp 98.7 °F (37.1 °C)   Resp 16   Ht 5' 6\" (1.676 m)   Wt 181 lb 7 oz (82.3 kg)   SpO2 94%   BMI 29.28 kg/m²   PHYSICAL EXAM:     In-person bedside physical examination deferred. Pursuant to the emergency declaration under the [address] [place] [address] and the [employer] Act, this clinical encounter was conducted to provide necessary medical care. (Also consistent with new provisions and guidance offered by Heladio Bustillos on March 18, 2020 in setting of COVID 19 outbreak and in order to preserve personal protective equipment in accordance with the flexibilities announced by CMS on March 30, 2020)   References: [email or web address]://[facility]. [place].[facility]/Portals/0/Resources/COVID-19/3_18%20Telemed%20Guidance%20Updated%20March%2018. pdf?[email or web address]                      https://[facility]. [facility]/Portals/0/Resources/COVID-19/3_18%20Telemed%20Guidance%20Updated%20March%2018. pdf?[email or web address]                      [email or web address]                              Pulmonary: deferred  Abdomen/GI: deferred  Neuro: deferred  Skin: deferred  Musculoskeletal:  deferred  Genitourinary: Deferred  Psych: deferred  Lymphatic/Immunologic: deferred             Data Review:    CBC:   Lab Results   Component Value Date    WBC 5.0 10/23/2020    HGB 8.9 (L) 10/23/2020    HCT 27.5 (L) 10/23/2020    MCV 86.1 10/23/2020     10/23/2020     RENAL:   Lab Results   Component Value Date    CREATININE 0.8 10/23/2020    BUN 18 10/23/2020     (L) 10/23/2020    K 3.2 (L) 10/23/2020    CL 94 (L) 10/23/2020    CO2 32 10/23/2020     SED RATE:   Lab Results   Component Value Date    SEDRATE 25 08/09/2016     CK:   Lab Results   Component Value Date    CKTOTAL 53 11/11/2014     CRP:   Lab Results   Component Value Date    CRP 1.9 08/09/2016     Hepatic Function Panel:   Lab Results   Component Value Date    ALKPHOS 62 10/23/2020    ALT 11 10/23/2020    AST 19 10/23/2020    PROT 5.4 10/23/2020    PROT 7.4 08/07/2012    BILITOT <0.2 10/23/2020    BILIDIR 0.11 08/07/2012    IBILI 0.2 08/07/2012    LABALBU 2.9 10/23/2020     UA:  Lab Results   Component Value Date    COLORU DK YELLOW 01/30/2020    CLARITYU Clear 01/30/2020    GLUCOSEU Negative 01/30/2020    GLUCOSEU NEGATIVE 03/15/2010    BILIRUBINUR SMALL 01/30/2020    BILIRUBINUR small 07/16/2019    BILIRUBINUR SMALL 03/15/2010    KETUA Negative 01/30/2020    SPECGRAV 1.023 01/30/2020    BLOODU Negative 01/30/2020    PHUR 6.0 01/30/2020    PROTEINU TRACE 01/30/2020    UROBILINOGEN 0.2 01/30/2020    NITRU Negative 01/30/2020    LEUKOCYTESUR Negative 01/30/2020    LABMICR YES 01/30/2020    URINETYPE Cleancatch 01/30/2020      Urine Microscopic:   Lab Results   Component Value Date    HYALCAST 0 01/30/2020    WBCUA 1 01/30/2020    RBCUA 1 01/30/2020    EPIU 0 01/30/2020     Urine Reflex to Culture: No results found for: URRFLXCULT      MICRO: cultures reviewed and updated by me   Blood Culture:   Lab Results   Component Value Date    [facility]  10/20/2020     No Growth to date. Any change in status will be called. BLOODCULT2  10/20/2020     No Growth to date.   Any change in status will be called. Culture, Respiratory [[id number]]   Collected: 10/21/20 1845    Order Status: Completed  Specimen: Sputum Expectorated  Updated: 10/22/20 1513     CULTURE, RESPIRATORY  Further report to follow     Gram Stain Result  2+ Epithelial Cells   No WBC's seen   2+ Gram positive cocci   4+ Gram negative rods    Narrative:      ORDER#: [id number]                          ORDERED BY: NARCISA Dang   SOURCE: Sputum Expectorated                COLLECTED:  10/21/20 18:45   ANTIBIOTICS AT GEOFF. :                      RECEIVED :  10/21/20 18:48   Performed at:   [facility] [address] [place], [facility] [address]   Phone [phone]    Strep Pneumoniae Antigen [[id number]]   Collected: 10/21/20 1745    Order Status: Completed  Specimen: Urine, clean catch  Updated: 10/22/20 0850     STREP PNEUMONIAE ANTIGEN, URINE  --     Presumptive Negative   Presumptive negative suggests no current or recent   pneumococcal infection. Infection due to Strep pneumoniae   cannot be ruled out since the antigen present in the sample   may be below the detection limit of the test.   Normal Range:Presumptive Negative    Narrative:      ORDER#: [id number]                          ORDERED BY: Mary Arora   SOURCE: Urine Clean Catch                  COLLECTED:  10/21/20 17:45   ANTIBIOTICS AT GEOFF. :                      RECEIVED :  10/21/20 17:52   Performed at:   [facility] [address] [place], [facility] [address]   Phone [phone]    Legionella antigen, urine [[id number]]   Collected: 10/21/20 1745    Order Status: Completed  Specimen: Urine, clean catch  Updated: 10/22/20 0847     L. pneumophila Serogp 1 Ur Ag  --     Presumptive Negative   No Legionella pneumophila serogroup 1 antigens detected.    A negative result does not exclude infection with   Legionella pneumophila serogroup 1 nor does it rule out   other Epithelial Cells  No WBC's seen  2+ Gram positive cocci  4+ Gram negative rods       AFB:No results found for: AFBSMEAR  Viral Culture:  Lab Results   Component Value Date    COVID19 DETECTED 10/21/2020     Urine Culture: No results for input(s): Caleb Calderon in the last 72 hours. Culture, Respiratory [[id number]]  (Abnormal)  Collected: 10/21/20 1845    Order Status: Completed  Specimen: Sputum Expectorated  Updated: 10/23/20 1304     Gram Stain Result  2+ Epithelial Cells   No WBC's seen   2+ Gram positive cocci   4+ Gram negative rods     Organism  Stenotrophomonas maltophiliaAbnormal       CULTURE, RESPIRATORY  --     Heavy growth   ID and sensitivity to follow    Narrative:      ORDER#: [id number]                          ORDERED BY: Zay Dennis   SOURCE: Sputum Expectorated                COLLECTED:  10/21/20 18:45   ANTIBIOTICS AT GEOFF. :                      RECEIVED :  10/21/20 18:48   Performed at:   [facility]        IMAGING:    VL Extremity Venous Bilateral   Final Result      CT CHEST PULMONARY EMBOLISM W CONTRAST   Final Result   Negative for acute pulmonary embolism. Multifocal ground-glass and consolidative airspace disease is suspicious for   infection, including atypical and viral pathogens (including COVID-19). Mild interstitial pulmonary edema. Mild subpleural reticular abnormality and bronchiolectasis, suggesting   underlying interstitial lung disease. Mild mediastinal and hilar lymphadenopathy, presumably reactive. XR CHEST PORTABLE   Final Result   Features of heart failure, with moderate interstitial pulmonary edema. Basilar opacities, favor atelectasis. Superimposed pneumonia or aspiration   could be present in the appropriate context.                All the pertinent images and reports for the current Hospitalization were reviewed by me     Scheduled Meds:   psyllium  1 packet Oral TID WC    sodium chloride flush  10 mL Intravenous 2 times per day    enoxaparin  30 mg Subcutaneous BID    aspirin  81 mg Oral Daily    atorvastatin  80 mg Oral Daily    levothyroxine  100 mcg Oral Daily    metoprolol tartrate  25 mg Oral BID    pantoprazole  20 mg Oral Daily    azithromycin  500 mg Intravenous Q24H    And    cefTRIAXone (ROCEPHIN) IV  1 g Intravenous Q24H    dexamethasone  6 mg Oral Daily    lisinopril  5 mg Oral Daily    amLODIPine  2.5 mg Oral Daily    isosorbide mononitrate  60 mg Oral Daily    montelukast  10 mg Oral Nightly    insulin lispro  0-12 Units Subcutaneous TID WC    insulin lispro  0-6 Units Subcutaneous Nightly    ranolazine  1,000 mg Oral BID    carBAMazepine  400 mg Oral BID    budesonide-formoterol  2 puff Inhalation BID    albuterol-ipratropium  1 puff Inhalation 4x Daily    pregabalin  50 mg Oral Nightly       Continuous Infusions:   dextrose         PRN Meds:  sodium chloride flush, acetaminophen **OR** acetaminophen, ALPRAZolam, perflutren lipid microspheres, ondansetron, guaiFENesin-dextromethorphan, albuterol sulfate HFA, oxyCODONE-acetaminophen, glucose, dextrose, glucagon (rDNA), dextrose      Assessment:     Patient Active Problem List   Diagnosis    Sprain of neck    Sciatica    Cervical spondylosis without myelopathy    Degeneration of cervical intervertebral disc    Cervical radiculitis    Chronic pain syndrome    Stroke (HCC)    Postmenopausal    COPD, severe (HCC)    HTN (hypertension)    Acquired hypothyroidism    Hypertriglyceridemia    Itching    Anxiety    Coronary artery disease involving native coronary artery of native heart with angina pectoris (HCC)    Rash    Primary osteoarthritis of right knee    Age-related osteoporosis without current pathological fracture    Diabetes mellitus ([facility] [address]    COVID-19    Pneumonia    CHF exacerbation (HCC)    COPD exacerbation (HCC)    Acute on chronic respiratory failure (HCC)    Sepsis ([facility] [address]    Edema of right lower extremity    Pneumonia due to COVID-19 virus    Chronic respiratory failure with hypoxia (HCC)    Asthma-COPD overlap syndrome (HCC)    Interstitial lung disease ([facility] [address]     COVID-19 infection   Severe COPD on home Oxygen  CAD  H/o CHF  Acute hypoxic resp failure  CT chest is abnormal   WBC normal   D dimer elevation        I suspect current resp issues and fevers are from COVID-19 infection as she has severe COPD and home Oxygen risk for progression and she is able to wean down to her home Oxygen level and needs to watch for any progression will start on Dexamethasone. Creat on this admit seems to be normal range and will be able to use IV Remdesivir if necessary     Sputum cx Stenotrophomonas and IV abx adjusted already by Jessica Cotto, Microbiology, Radiology and all the pertinent results from current hospitalization and  care every where were reviewed  by me as a part of the evaluation   Plan:   1. Cont Dexamethasone x 10 days total    2. Able to wean down to her home OXYGEN level 2 lts nasal cannula   3. We will consider IV Remdesivir with any change in Oxygen requirements but for now she is at her base line level 4. Given her age not sure if Convalescent plasma would be benificial   5. Blood cx and Urine antigens  -ve  6. Cont Levofloxacin x 7 days at d/c agree - Would avoid Bactrim given her age risk for ALICIA   7. [id number] Silke Riley for d/c planning when ok with primary     Will sign off  Call with Questions ? Discussed with patient/Family and Nursing staff     Thanks for allowing me to participate in your patient's care and please call me with any questions or concerns.     Nichole Henry MD  Infectious Disease  [facility]) Physician  Phone: [phone]   Fax : [phone]

## 2020-10-23 NOTE — PLAN OF CARE
Problem: Falls - Risk of:  Goal: Will remain free from falls  10/23/2020 1947 by Jone Jensen RN  Outcome: Ongoing     Problem: Falls - Risk of:  Goal: Absence of physical injury  10/23/2020 1947 by Jone Jensen RN  Outcome: Ongoing     Problem: Airway Clearance - Ineffective  Goal: Achieve or maintain patent airway  10/23/2020 1947 by Jone Jensen RN  Outcome: Ongoing     Problem: Gas Exchange - Impaired  Goal: Absence of hypoxia  10/23/2020 1947 by Jone Jensen RN  Outcome: Ongoing     Problem: Gas Exchange - Impaired  Goal: Promote optimal lung function  10/23/2020 1947 by Jone Jensen RN  Outcome: Ongoing     Problem: Breathing Pattern - Ineffective  Goal: Ability to achieve and maintain a regular respiratory rate  10/23/2020 1947 by Jone Jensen RN  Outcome: Ongoing     Problem: Body Temperature -  Risk of, Imbalanced  Goal: Ability to maintain a body temperature within defined limits  10/23/2020 1947 by Jone Jensen RN  Outcome: Ongoing     Problem: Body Temperature -  Risk of, Imbalanced  Goal: Will regain or maintain usual level of consciousness  10/23/2020 1947 by Jone Jensen RN  Outcome: Ongoing     Problem:  Body Temperature -  Risk of, Imbalanced  Goal: Complications related to the disease process, condition or treatment will be avoided or minimized  10/23/2020 1947 by Jone Jensen RN  Outcome: Ongoing     Problem: Isolation Precautions - Risk of Spread of Infection  Goal: Prevent transmission of infection  10/23/2020 1947 by Jone Jensen RN  Outcome: Ongoing     Problem: Nutrition Deficits  Goal: Optimize nutrtional status  10/23/2020 1947 by Jone Jensen RN  Outcome: Ongoing     Problem: Risk for Fluid Volume Deficit  Goal: Maintain normal heart rhythm  10/23/2020 1947 by Jone Jensen RN  Outcome: Ongoing     Problem: Risk for Fluid Volume Deficit  Goal: Maintain absence of muscle cramping  10/23/2020 1947 by Jone Jensen RN  Outcome: Ongoing appropriate level of care  10/23/2020 1947 by Prakash Amanda, RN  Outcome: Ongoing

## 2020-10-23 NOTE — PROGRESS NOTES
Hospitalist Progress Note      PCP: Barry Newton MD    Chief Complaint. Presented to hospital for shortness of breath    Date of Admission: 10/20/2020      Subjective:  denies chest pain, nausea, vomiting, fever or chills. mention feels overall better. Patient mentions her shortness of breath is overall better    Medications:  Reviewed    Infusion Medications    dextrose       Scheduled Medications    levoFLOXacin  750 mg Oral Daily    psyllium  1 packet Oral TID WC    sodium chloride flush  10 mL Intravenous 2 times per day    enoxaparin  30 mg Subcutaneous BID    aspirin  81 mg Oral Daily    atorvastatin  80 mg Oral Daily    levothyroxine  100 mcg Oral Daily    metoprolol tartrate  25 mg Oral BID    pantoprazole  20 mg Oral Daily    dexamethasone  6 mg Oral Daily    lisinopril  5 mg Oral Daily    amLODIPine  2.5 mg Oral Daily    isosorbide mononitrate  60 mg Oral Daily    montelukast  10 mg Oral Nightly    insulin lispro  0-12 Units Subcutaneous TID WC    insulin lispro  0-6 Units Subcutaneous Nightly    ranolazine  1,000 mg Oral BID    carBAMazepine  400 mg Oral BID    budesonide-formoterol  2 puff Inhalation BID    albuterol-ipratropium  1 puff Inhalation 4x Daily    pregabalin  50 mg Oral Nightly     PRN Meds: sodium chloride flush, acetaminophen **OR** acetaminophen, ALPRAZolam, perflutren lipid microspheres, ondansetron, guaiFENesin-dextromethorphan, albuterol sulfate HFA, oxyCODONE-acetaminophen, glucose, dextrose, glucagon (rDNA), dextrose      Intake/Output Summary (Last 24 hours) at 10/23/2020 1844  Last data filed at 10/23/2020 1603  Gross per 24 hour   Intake 870 ml   Output 1000 ml   Net -130 ml       Physical Exam Performed:    BP (!) 112/56   Pulse 89   Temp 98.4 °F (36.9 °C) (Oral)   Resp 18   Ht 5' 6\" (1.676 m)   Wt 181 lb 7 oz (82.3 kg)   SpO2 93%   BMI 29.28 kg/m²     General appearance: No apparent distress,   HEENT:  Conjunctivae/corneas clear.   Neck: Supple, with full range of motion. Respiratory:  Normal respiratory effort. Clear to auscultation, bilaterally without Rales/Wheezes/Rhonchi. Cardiovascular: Regular rate and rhythm with normal S1/S2 without murmurs or rubs  Abdomen: Soft, non-tender, non-distended, normal bowel sounds. Musculoskeletal: No cyanosis or edema bilaterally  Neurologic:  without any focal sensory/motor deficits. grossly non-focal.  Psychiatric: Alert and oriented, Normal mood  Peripheral Pulses: +2 palpable, equal bilaterally       Labs:   Recent Labs     10/21/20  0551 10/22/20  0548 10/23/20  0539   WBC 11.6* 8.3 5.0   HGB 9.6* 9.7* 8.9*   HCT 30.3* 29.9* 27.5*    168 155     Recent Labs     10/21/20  0551 10/22/20  0548 10/23/20  0539    139 135*   K 3.9 3.3* 3.2*   CL 95* 98* 94*   CO2 32 31 32   BUN 13 19 18   CREATININE 1.0 1.0 0.8   CALCIUM 7.8* 7.9* 8.0*     Recent Labs     10/21/20  0551 10/22/20  0548 10/23/20  0539   AST 16 16 19   ALT 12 11 11   BILITOT <0.2 <0.2 <0.2   ALKPHOS 83 72 62     Recent Labs     10/21/20  0551 10/22/20  0548 10/23/20  0539   INR 1.09 1.06 1.04     Recent Labs     10/20/20  2227   TROPONINI <0.01       Urinalysis:      Lab Results   Component Value Date    NITRU Negative 01/30/2020    WBCUA 1 01/30/2020    RBCUA 1 01/30/2020    BLOODU Negative 01/30/2020    SPECGRAV 1.023 01/30/2020    GLUCOSEU Negative 01/30/2020    GLUCOSEU NEGATIVE 03/15/2010       Radiology:  VL Extremity Venous Bilateral   Final Result      CT CHEST PULMONARY EMBOLISM W CONTRAST   Final Result   Negative for acute pulmonary embolism. Multifocal ground-glass and consolidative airspace disease is suspicious for   infection, including atypical and viral pathogens (including COVID-19). Mild interstitial pulmonary edema. Mild subpleural reticular abnormality and bronchiolectasis, suggesting   underlying interstitial lung disease. Mild mediastinal and hilar lymphadenopathy, presumably reactive.          XR CHEST PORTABLE   Final Result   Features of heart failure, with moderate interstitial pulmonary edema. Basilar opacities, favor atelectasis. Superimposed pneumonia or aspiration   could be present in the appropriate context. Assessment/Plan:    Active Hospital Problems    Diagnosis    COVID-19 [U07.1]    Pneumonia [J18.9]    CHF exacerbation (HCC) [I50.9]    COPD exacerbation ([facility] [J44.1]    Acute on chronic respiratory failure (HCC) [J96.20]    [facility]eps[facility] [A41.9]    Edema of right lower extremity [R60.0]    Pneumonia due to COVID-19 virus [U07.1, J12.89]    Chronic respiratory failure with hypoxia (HCC) [J96.11]    Asthma-COPD overlap syndrome (HCC) [J44.9]    Interstitial lung disease ([facility] [J84.9]    Diabetes mellitus ([facility] [E11.9]    Coronary artery disease involving native coronary artery of native heart with angina pectoris ([facility] [I25.119]    HTN (hypertension) [I10]    Hypertriglyceridemia [E78.1]       Patient is a 31-year-old female with past medical history of COPD, chronic respiratory failure, CAD, hypertension, previous CVA, CHF who presented to hospital for worsening shortness of breath and hypoxia. Patient was found positive for COVID-19 infection.     Assessment  Acute on chronic hypoxic respiratory failure  COVID-19 pneumonia  Possible bacterial pneumonia  Interstitial lung disease  COPD  CAD  Diabetes mellitus 2  Hypertension      Plan  Continue oxygen supplementation, uses 2 L oxygen at home, currently on 3 L nasal cannula  Started on Decadron, complete 10 days of Decadron  Antibiotics changed to Levaquin at discharge for total of 7 days course of antibiotics  Continue Symbicort, Combivent  Insulin sliding scale  Continue home aspirin, statin, amlodipine, levothyroxine  DVT prophylaxis with Lovenox 30 twice daily  Diet: DIET LOW SODIUM 2 GM; 1500 ml  Dietary Nutrition Supplements: Low Volume Supplement  Code Status: Full Code    PT/OT Eval Status: ordered    Dispo

## 2020-10-23 NOTE — PROGRESS NOTES
Pulmonary Progress Note    CC: COVID-19 pneumonia  Left upper lobe pneumonia  Chronic hypoxic respiratory failure (on 2L NC)  Asthma/COPD  Interstitial lung disease    24 hr events:  She is afebrile. She denies shortness of breath. She has a cough with white mucus. ROS:  No SOB  +cough  No vomiting       PHYSICAL EXAM:  Blood pressure (!) 118/52, pulse 95, temperature 98.7 °F (37.1 °C), temperature source Oral, resp. rate 18, height 5' 6\" (1.676 m), weight 181 lb 7 oz (82.3 kg), SpO2 94 %, not currently breastfeeding. on 2L NC    Intake/Output Summary (Last 24 hours) at 10/23/2020 1156  Last data filed at 10/23/2020 0559  Gross per 24 hour   Intake 510 ml   Output 900 ml   Net -390 ml       Gen: Well developed; well nourished  Eyes: No scleral icterus. No conjunctival injection. ENT: External appearance of ears and nose normal.  Neck: Trachea midline. No obvious mass. No visible thyroid enlargement    Respiratory:  Expiratory wheezes bilaterally, no accessory muscle use  Cardiovascular: Regular rate and rhythm, no appreciable murmurs. No edema. Skin: Warm and dry. No rashes or ulcers on visible areas. Normal texture and turgor  Musculoskeletal: No cyanosis, clubbing or joint deformity.     Psychiatric: Normal mood and affect; exhibits normal insight and judgement     Medications:  Current Facility-Administered Medications: potassium chloride 10 mEq/100 mL IVPB (Peripheral Line), 10 mEq, Intravenous, Q1H  psyllium (KONSYL) 28.3 % packet 1 packet, 1 packet, Oral, TID WC  sodium chloride flush 0.9 % injection 10 mL, 10 mL, Intravenous, 2 times per day  sodium chloride flush 0.9 % injection 10 mL, 10 mL, Intravenous, PRN  acetaminophen (TYLENOL) tablet 650 mg, 650 mg, Oral, Q6H PRN **OR** acetaminophen (TYLENOL) suppository 650 mg, 650 mg, Rectal, Q6H PRN  enoxaparin (LOVENOX) injection 30 mg, 30 mg, Subcutaneous, BID  aspirin EC tablet 81 mg, 81 mg, Oral, Daily  atorvastatin (LIPITOR) tablet 80 mg, 80 mg, Oral, Daily  levothyroxine (SYNTHROID) tablet 100 mcg, 100 mcg, Oral, Daily  metoprolol tartrate (LOPRESSOR) tablet 25 mg, 25 mg, Oral, BID  pantoprazole (PROTONIX) tablet 20 mg, 20 mg, Oral, Daily  ALPRAZolam (XANAX) tablet 1 mg, 1 mg, Oral, BID PRN  perflutren lipid microspheres (DEFINITY) injection 1.65 mg, 1.5 mL, Intravenous, ONCE PRN  ondansetron (ZOFRAN) injection 4 mg, 4 mg, Intravenous, Q6H PRN  azithromycin (ZITHROMAX) 500 mg in D5W 250ml addavial, 500 mg, Intravenous, Q24H **AND** cefTRIAXone (ROCEPHIN) 1 g IVPB in 50 mL D5W minibag, 1 g, Intravenous, Q24H  guaiFENesin-dextromethorphan (ROBITUSSIN DM) 100-10 MG/5ML syrup 5 mL, 5 mL, Oral, Q4H PRN  dexamethasone (DECADRON) tablet 6 mg, 6 mg, Oral, Daily  lisinopril (PRINIVIL;ZESTRIL) tablet 5 mg, 5 mg, Oral, Daily  albuterol sulfate  (90 Base) MCG/ACT inhaler 2 puff, 2 puff, Inhalation, Q6H PRN  amLODIPine (NORVASC) tablet 2.5 mg, 2.5 mg, Oral, Daily  isosorbide mononitrate (IMDUR) extended release tablet 60 mg, 60 mg, Oral, Daily  montelukast (SINGULAIR) tablet 10 mg, 10 mg, Oral, Nightly  oxyCODONE-acetaminophen (PERCOCET) 7.5-325 MG per tablet 1 tablet, 1 tablet, Oral, Q6H PRN  insulin lispro (HUMALOG) injection vial 0-12 Units, 0-12 Units, Subcutaneous, TID WC  insulin lispro (HUMALOG) injection vial 0-6 Units, 0-6 Units, Subcutaneous, Nightly  glucose (GLUTOSE) 40 % oral gel 15 g, 15 g, Oral, PRN  dextrose 50 % IV solution, 12.5 g, Intravenous, PRN  glucagon (rDNA) injection 1 mg, 1 mg, Intramuscular, PRN  dextrose 5 % solution, 100 mL/hr, Intravenous, PRN  ranolazine (RANEXA) extended release tablet 1,000 mg, 1,000 mg, Oral, BID  carBAMazepine (TEGRETOL XR) extended release tablet 400 mg, 400 mg, Oral, BID  budesonide-formoterol (SYMBICORT) 160-4.5 MCG/ACT inhaler 2 puff, 2 puff, Inhalation, BID  albuterol-ipratropium (COMBIVENT RESPIMAT)  MCG/ACT inhaler 1 puff, 1 puff, Inhalation, 4x Daily  pregabalin (LYRICA) capsule 50 mg, 50 mg, Oral, Nightly    Data reviewed:  Labs:  CBC:   Recent Labs     10/21/20  0551 10/22/20  0548 10/23/20  0539   WBC 11.6* 8.3 5.0   HGB 9.6* 9.7* 8.9*   HCT 30.3* 29.9* 27.5*   MCV 87.2 86.1 86.1    168 155     BMP:   Recent Labs     10/21/20  0551 10/22/20  0548 10/23/20  0539    139 135*   K 3.9 3.3* 3.2*   CL 95* 98* 94*   CO2 32 31 32   BUN 13 19 18   CREATININE 1.0 1.0 0.8     LIVER PROFILE:   Recent Labs     10/21/20  0551 10/22/20  0548 10/23/20  0539   AST 16 16 19   ALT 12 11 11   BILITOT <0.2 <0.2 <0.2   ALKPHOS 83 72 62     PT/INR:   Recent Labs     10/21/20  0551 10/22/20  0548 10/23/20  0539   PROTIME 12.6 12.3 12.1   INR 1.09 1.06 1.04     APTT:   Recent Labs     10/21/20  0551 10/22/20  0548 10/23/20  0539   APTT 24.5 25.9 24.8     Cultures:   Blood culture (10/20): NGTD  Sputum culture (10/21): GNR  Urine strep and Legionella antigens: Negative      Films:  Chest images and reports were reviewed by me. My interpretation is:  No new images    Assessment:     COVID-19 pneumonia  Left upper lobe pneumonia  Chronic hypoxic respiratory failure (on 2L NC)  Asthma/COPD  Interstitial lung disease      Plan:    COVID-19 pneumonia  -Decadron 6mg daily. Would complete 10 days of decadron     Left upper lobe pneumonia  -Ceftriaxone and azithromycin (day #3). Change to levaquin to complete 7 days of antibiotics      Chronic hypoxic respiratory failure (on 2L NC)  -Continue supplemental oxygen to keep oxygen saturation greater than 90%     Asthma/COPD  -Decadron daily   -Symbicort twice daily  -Combivent 4 times daily  -For discharge resume home medications of advair, spiriva, albuterol inhaler and duonebs      Interstitial lung disease  -Thought to be nonspecific interstitial pneumonia. She follows with Dr. Jake Morales. Kathrin as above        Thank you for allowing me to participate in the care of this patient. Will sign off. Please call with any questions or concerns. She follows with Dr. Jake Morales.      Golden

## 2020-10-24 NOTE — PLAN OF CARE
is not possible  10/24/2020 0008 by Marce Petersen RN  Outcome: Ongoing     Problem: Fatigue  Goal: Verbalize increase energy and improved vitality  10/24/2020 0008 by Marce Petersen RN  Outcome: Ongoing     Problem: Patient Education: Go to Patient Education Activity  Goal: Patient/Family Education  10/24/2020 0008 by Marce Petersen RN  Outcome: Ongoing     Problem: OXYGENATION/RESPIRATORY FUNCTION  Goal: Patient will maintain patent airway  10/24/2020 0008 by Marce Petersen RN  Outcome: Ongoing     Problem: FLUID AND ELECTROLYTE IMBALANCE  Goal: Fluid and electrolyte balance are achieved/maintained  10/24/2020 0008 by Marce Petersen RN  Outcome: Ongoing     Problem: ACTIVITY INTOLERANCE/IMPAIRED MOBILITY  Goal: Mobility/activity is maintained at optimum level for patient  10/24/2020 0008 by Marce Petersen RN  Outcome: Ongoing     Problem: Nutrition  Goal: Optimal nutrition therapy  10/24/2020 0008 by Marce Petersen RN  Outcome: Ongoing     Problem: Pain:  Goal: Pain level will decrease  Description: Pain level will decrease  10/24/2020 0008 by Marce Petersen RN  Outcome: Ongoing

## 2020-10-24 NOTE — PROGRESS NOTES
Hospitalist Progress Note      PCP: Harlan Hunt MD    Chief Complaint. Presented to hospital for shortness of breath    Date of Admission: 10/20/2020      Subjective:  Oxygen requirement upto 4 L today. Feels like and slightly short of breath. Denies other concerns    Medications:  Reviewed    Infusion Medications    dextrose       Scheduled Medications    carBAMazepine  300 mg Oral BID    levoFLOXacin  750 mg Oral Daily    psyllium  1 packet Oral TID WC    sodium chloride flush  10 mL Intravenous 2 times per day    enoxaparin  30 mg Subcutaneous BID    aspirin  81 mg Oral Daily    atorvastatin  80 mg Oral Daily    levothyroxine  100 mcg Oral Daily    metoprolol tartrate  25 mg Oral BID    pantoprazole  20 mg Oral Daily    dexamethasone  6 mg Oral Daily    lisinopril  5 mg Oral Daily    amLODIPine  2.5 mg Oral Daily    isosorbide mononitrate  60 mg Oral Daily    montelukast  10 mg Oral Nightly    insulin lispro  0-12 Units Subcutaneous TID WC    insulin lispro  0-6 Units Subcutaneous Nightly    ranolazine  1,000 mg Oral BID    budesonide-formoterol  2 puff Inhalation BID    albuterol-ipratropium  1 puff Inhalation 4x Daily    pregabalin  50 mg Oral Nightly     PRN Meds: sodium chloride flush, acetaminophen **OR** acetaminophen, ALPRAZolam, perflutren lipid microspheres, ondansetron, guaiFENesin-dextromethorphan, albuterol sulfate HFA, oxyCODONE-acetaminophen, glucose, dextrose, glucagon (rDNA), dextrose      Intake/Output Summary (Last 24 hours) at 10/24/2020 1232  Last data filed at 10/24/2020 1143  Gross per 24 hour   Intake 1260 ml   Output 1125 ml   Net 135 ml       Physical Exam Performed:    BP (!) 132/58   Pulse 72   Temp 99.2 °F (37.3 °C) (Oral)   Resp 22   Ht 5' 6\" (1.676 m)   Wt 187 lb 6.3 oz (85 kg)   SpO2 91%   BMI 30.25 kg/m²     General appearance: No apparent distress,   HEENT:  Conjunctivae/corneas clear. Neck: Supple, with full range of motion.    Respiratory: Normal respiratory effort. Clear to auscultation, bilaterally without Rales/Wheezes/Rhonchi. Cardiovascular: Regular rate and rhythm with normal S1/S2 without murmurs or rubs  Abdomen: Soft, non-tender, non-distended, normal bowel sounds. Musculoskeletal: No cyanosis or edema bilaterally  Neurologic:  without any focal sensory/motor deficits. grossly non-focal.  Psychiatric: Alert and oriented, Normal mood  Peripheral Pulses: +2 palpable, equal bilaterally       Labs:   Recent Labs     10/22/20  0548 10/23/20  0539 10/24/20  0622   WBC 8.3 5.0 3.9*   HGB 9.7* 8.9* 9.2*   HCT 29.9* 27.5* 28.5*    155 157     Recent Labs     10/22/20  0548 10/23/20  0539 10/24/20  0622    135* 133*   K 3.3* 3.2* 3.9   CL 98* 94* 94*   CO2 31 32 32   BUN 19 18 14   CREATININE 1.0 0.8 0.9   CALCIUM 7.9* 8.0* 8.0*     Recent Labs     10/22/20  0548 10/23/20  0539 10/24/20  0622   AST 16 19 22   ALT 11 11 12   BILITOT <0.2 <0.2 <0.2   ALKPHOS 72 62 73     Recent Labs     10/22/20  0548 10/23/20  0539   INR 1.06 1.04     No results for input(s): Donna Basques in the last 72 hours. Urinalysis:      Lab Results   Component Value Date    NITRU Negative 01/30/2020    WBCUA 1 01/30/2020    RBCUA 1 01/30/2020    BLOODU Negative 01/30/2020    SPECGRAV 1.023 01/30/2020    GLUCOSEU Negative 01/30/2020    GLUCOSEU NEGATIVE 03/15/2010       Radiology:  VL Extremity Venous Bilateral   Final Result      CT CHEST PULMONARY EMBOLISM W CONTRAST   Final Result   Negative for acute pulmonary embolism. Multifocal ground-glass and consolidative airspace disease is suspicious for   infection, including atypical and viral pathogens (including COVID-19). Mild interstitial pulmonary edema. Mild subpleural reticular abnormality and bronchiolectasis, suggesting   underlying interstitial lung disease. Mild mediastinal and hilar lymphadenopathy, presumably reactive.          XR CHEST PORTABLE   Final Result   Features of heart failure, with moderate interstitial pulmonary edema. Basilar opacities, favor atelectasis. Superimposed pneumonia or aspiration   could be present in the appropriate context. 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 Patient was found positive for COVID-19 infection.     Assessment  Acute on chronic hypoxic respiratory failure 2/2 COVID-19 pneumonia  Possible bacterial pneumonia  Interstitial lung disease  COPD  CAD  Diabetes mellitus 2  Hypertension      Plan  -Continue oxygen supplementation, uses 2 L oxygen at home  -continue 10 days of Decadron  -Continue levaquin  -Continue Symbicort, Combivent  -Insulin sliding scale  -Continue home aspirin, statin, amlodipine, levothyroxine    DVT prophylaxis with Lovenox 30 twice daily  Diet: DIET LOW SODIUM 2 GM; 1500 ml  Dietary Nutrition Supplements: Low Volume Supplement  Code Status: Full Code    PT/OT Eval Status: ordered    Dispo -likely discharge tomorrow    Tracie Lomeli MD

## 2020-10-24 NOTE — PLAN OF CARE
Problem: Falls - Risk of:  Goal: Will remain free from falls  Description: Will remain free from falls  10/24/2020 0946 by Patricia Venegas RN  Outcome: Ongoing  10/24/2020 0008 by Fredy Olvera RN  Outcome: Ongoing  10/23/2020 1947 by Anabel Parmar RN  Outcome: Ongoing   Fall risk assessment completed every shift. All precautions in place. Pt has call light within reach at all times. Room clear of clutter. Pt aware to call for assistance when getting up. Bed locked in lowest position, alarm engaged, call light within reach, will continue to monitor. Problem: Airway Clearance - Ineffective  Goal: Achieve or maintain patent airway  10/24/2020 0946 by Patricia Venegas RN  Outcome: Ongoing  10/24/2020 0008 by Fredy Olvera RN  Outcome: Ongoing  10/23/2020 1947 by Anabel Parmar RN  Outcome: Ongoing     Problem: Gas Exchange - Impaired  Goal: Absence of hypoxia  10/24/2020 0946 by Patricia Venegas RN  Outcome: Ongoing  10/24/2020 0008 by Fredy Olvera RN  Outcome: Ongoing  10/23/2020 1947 by Anabel Parmar RN  Outcome: Ongoing     Problem: Breathing Pattern - Ineffective  Goal: Ability to achieve and maintain a regular respiratory rate  10/24/2020 0946 by Patricia Venegas RN  Outcome: Ongoing  10/24/2020 0008 by Fredy Olvera RN  Outcome: Ongoing  10/23/2020 1947 by Anabel Parmar RN  Outcome: Ongoing     Problem:  Body Temperature -  Risk of, Imbalanced  Goal: Ability to maintain a body temperature within defined limits  10/24/2020 0946 by Patricia Venegas RN  Outcome: Ongoing  10/24/2020 0008 by Fredy Olvera RN  Outcome: Ongoing  10/23/2020 1947 by Anabel Parmar RN  Outcome: Ongoing     Problem: Isolation Precautions - Risk of Spread of Infection  Goal: Prevent transmission of infection  10/24/2020 0946 by Patricia Venegas RN  Outcome: Ongoing  10/24/2020 0008 by Fredy Olvera RN  Outcome: Ongoing  10/23/2020 1947 by Anabel Parmar RN  Outcome: Ongoing     Problem: Nutrition Deficits  Goal: Optimize nutrtional status  10/24/2020 0946 by Jess Rand RN  Outcome: Ongoing  10/24/2020 0008 by Maria Esther Stubbs RN  Outcome: Ongoing  10/23/2020 1947 by Grace Sanford RN  Outcome: Ongoing     Problem: Risk for Fluid Volume Deficit  Goal: Maintain normal heart rhythm  10/24/2020 0946 by Jess Rand RN  Outcome: Ongoing  10/24/2020 0008 by Maria Esther Stubbs RN  Outcome: Ongoing  10/23/2020 1947 by Grace Sanford RN  Outcome: Ongoing     Problem: Loneliness or Risk for Loneliness  Goal: Demonstrate positive use of time alone when socialization is not possible  10/24/2020 0946 by Jess Rand RN  Outcome: Ongoing  10/24/2020 0008 by Maria Esther Stubbs RN  Outcome: Ongoing  10/23/2020 1947 by Grace Sanford RN  Outcome: Ongoing     Problem: OXYGENATION/RESPIRATORY FUNCTION  Goal: Patient will maintain patent airway  10/24/2020 0946 by Jess Rand RN  Outcome: Ongoing  10/24/2020 0008 by Maria Esther Stubbs RN  Outcome: Ongoing  10/23/2020 1947 by Grace Sanford RN  Outcome: Ongoing     Problem: HEMODYNAMIC STATUS  Goal: Patient has stable vital signs and fluid balance  10/24/2020 0946 by Jess Rand RN  Outcome: Ongoing  10/23/2020 1947 by Grace Sanford RN  Outcome: Ongoing   Vitals completed q4h and assessed by RN. I&O charted and assessed per MD order. Pt tolerating adequate fluid intake. Problem: ACTIVITY INTOLERANCE/IMPAIRED MOBILITY  Goal: Mobility/activity is maintained at optimum level for patient  10/24/2020 0946 by Jess Rand RN  Outcome: Ongoing  10/24/2020 0008 by Maria Esther Stubbs RN  Outcome: Ongoing  10/23/2020 1947 by Grace Sanford RN  Outcome: Ongoing   Assess pts mobility status and compare to baseline. Determine if pt uses crutches/cane/walker and make sure they are within reach. Provide bedside commode to conserve pt energy. Encourage adeqaute rest periods, especially before meals, other activities of daily living, exercise sessions,

## 2020-10-25 NOTE — PROGRESS NOTES
interstitial lung disease. Mild mediastinal and hilar lymphadenopathy, presumably reactive. XR CHEST PORTABLE   Final Result   Features of heart failure, with moderate interstitial pulmonary edema. Basilar opacities, favor atelectasis. Superimposed pneumonia or aspiration   could be present in the appropriate context. Assessment/Plan:    Active Hospital Problems    Diagnosis    COVID-19 [U07.1]    Pneumonia [J18.9]    CHF exacerbation (HCC) [I50.9]    COPD exacerbation ([facility] [J44.1]    Acute on chronic respiratory failure (HCC) [J96.20]    Sepsis ([facility] [A41.9]    Edema of right lower extremity [R60.0]    Pneumonia due to COVID-19 virus [U07.1, J12.89]    Chronic respiratory failure with hypoxia (HCC) [J96.11]    Asthma-COPD overlap syndrome (HCC) [J44.9]    Interstitial lung disease ([facility] [J84.9]    Diabetes mellitus [facility] [E11.9]    Coronary artery disease involving native coronary artery of native heart with angina pectoris ([facility] [I25.119]    HTN (hypertension) [I10]    Hypertriglyceridemia [E78.1]       Patient is a 64-year-old female with past medical history of COPD, chronic respiratory failure, CAD, hypertension, previous CVA, CHF who presented to hospital for worsening shortness of breath and hypoxia. Patient was found positive for COVID-19 infection.     Assessment  Acute on chronic hypoxic respiratory failure 2/2 COVID-19 pneumonia  Possible bacterial pneumonia  Interstitial lung disease  COPD  CAD  Diabetes mellitus 2  Hypertension      Plan  -Continue oxygen supplementation, uses 2 L oxygen at home  -continue 10 days of Decadron  -Continue levaquin  -Continue Symbicort, Combivent  -Insulin sliding scale  -Continue home aspirin, statin, amlodipine, levothyroxine    DVT prophylaxis with Lovenox 30 twice daily  Diet: DIET LOW SODIUM 2 GM; 1500 ml  Dietary Nutrition Supplements: Low Volume Supplement  Code Status: Full Code    PT/OT Eval Status: ordered    Dispo -continue to monitor for now.   Wean oxygen as tolerated    Kely Durham MD

## 2020-10-25 NOTE — PLAN OF CARE
Pt is a high fall risk. Bed in locked, low position with side rails up X 2 and an active bed alarm. Fall risk sign, socks, and bracelet in place. Pt oriented to her abilities and compliant with the use of her call light. Pt is a standby assist because she has a cullen and CMU box/wires. Pt is on 4 L NC and normally wears 2 L NC PRN at home. Pt desaturates into the upper 80's while talking or when ambulating but recovers quickly once at rest. Pt has crackles and rhonchi throughout. Pt coughs anytime she takes a deep breath. Pt educated on coughing in a tissues, discarding it in the trash, and cleaning her hands with the hand  on her table. Pt VU. Pt is on a fluid restriction. 1.5 L fluid restriction sign posted outside her room. Pt says she takes her lyrica and xanax around 2300 every night. Pharmacy contacted to modify the scheduled time of the lyrica to 2300. See MAR for change.

## 2020-10-25 NOTE — PROGRESS NOTES
Message sent to the MD regarding patient wanting something for bowels to move.     Electronically signed by Mally Brewster RN on 10/25/2020 at 5:13 PM

## 2020-10-25 NOTE — PLAN OF CARE
Problem: Falls - Risk of:  Goal: Will remain free from falls  Description: Will remain free from falls  10/25/2020 1135 by Lai Thapa RN  Outcome: Ongoing     Problem: Falls - Risk of:  Goal: Absence of physical injury  Description: Absence of physical injury  10/25/2020 1135 by Lai Thapa RN  Outcome: Ongoing     Problem: Airway Clearance - Ineffective  Goal: Achieve or maintain patent airway  10/25/2020 1135 by Lai Thapa RN  Outcome: Ongoing     Problem: Gas Exchange - Impaired  Goal: Absence of hypoxia  10/25/2020 1135 by Lai Thapa RN  Outcome: Ongoing     Problem: Gas Exchange - Impaired  Goal: Promote optimal lung function  10/25/2020 1135 by Lai Thapa RN  Outcome: Ongoing     Problem: Breathing Pattern - Ineffective  Goal: Ability to achieve and maintain a regular respiratory rate  10/25/2020 1135 by Lai Thapa RN  Outcome: Ongoing     Problem:  Body Temperature -  Risk of, Imbalanced  Goal: Ability to maintain a body temperature within defined limits  10/25/2020 1135 by Lai Thapa RN  Outcome: Ongoing

## 2020-10-25 NOTE — PROGRESS NOTES
Message sent again to MD. Patient requesting soap ashley enema. MD Ordered soap ashley enema. Went into patient's room to give enema and patient wants to try just the lactulose first and reevaluate later tonight. Order for soap ashley enema placed for when patient is ready.     Electronically signed by Xu Baez RN on 10/25/2020 at 6:52 PM

## 2020-10-25 NOTE — PROGRESS NOTES
MD replied to RN and placed order for lactulose. See MAR. RN updated daughter and pt on POC for bowel movements. Daughter expressed concerns about why an enema hasn't been given. RN educated daughter on safety of enemas and using less invasive measures before proceeding to enema. Pt has had poor appetite and not eating >50% of meals. RN encouraged intake. RN has also used alternative interventions such as pt movement, eating foods that aide in having bowels, and proper fluid intake. Pt and daughter agreeable to POC.  Electronically signed by Christopher Oliver RN on 10/25/2020 at 5:36 PM

## 2020-10-26 NOTE — PROGRESS NOTES
Patient daughter Elle Robin notified and educated of remdesivir antiviral tx at this time, daughter would like to speak with pcp and get back with this nurse at this time [facility]

## 2020-10-26 NOTE — PROGRESS NOTES
Progress Note  Admit Date: 10/20/2020      PCP: Josseline Cowart MD     CC: F/U for COVID    Days in hospital:  5    SUBJECTIVE / Interval History:  Patient feels okay. Needing 8- 10  L of oxygen  Had some chest pain earlier in the day which resolved with nitro. Patient states she takes nitro off-and-on at home for chest pain      Allergies  Acetaminophen-codeine and Codeine    Medications    Scheduled Meds:   carBAMazepine  300 mg Oral BID    polyethylene glycol  17 g Oral Daily    levoFLOXacin  750 mg Oral Daily    psyllium  1 packet Oral TID WC    sodium chloride flush  10 mL Intravenous 2 times per day    enoxaparin  30 mg Subcutaneous BID    aspirin  81 mg Oral Daily    atorvastatin  80 mg Oral Daily    levothyroxine  100 mcg Oral Daily    metoprolol tartrate  25 mg Oral BID    pantoprazole  20 mg Oral Daily    dexamethasone  6 mg Oral Daily    lisinopril  5 mg Oral Daily    amLODIPine  2.5 mg Oral Daily    isosorbide mononitrate  60 mg Oral Daily    montelukast  10 mg Oral Nightly    insulin lispro  0-12 Units Subcutaneous TID WC    insulin lispro  0-6 Units Subcutaneous Nightly    ranolazine  1,000 mg Oral BID    budesonide-formoterol  2 puff Inhalation BID    albuterol-ipratropium  1 puff Inhalation 4x Daily    pregabalin  50 mg Oral Nightly     Continuous Infusions:   dextrose         PRN Meds:  sodium chloride flush, acetaminophen **OR** acetaminophen, ALPRAZolam, perflutren lipid microspheres, ondansetron, guaiFENesin-dextromethorphan, albuterol sulfate HFA, oxyCODONE-acetaminophen, glucose, dextrose, glucagon (rDNA), dextrose    Vitals    BP (!) 130/50   Pulse 73   Temp 99.8 °F (37.7 °C) (Oral)   Resp 18   Ht 5' 6\" (1.676 m)   Wt 186 lb 11.7 oz (84.7 kg)   SpO2 100%   BMI 30.14 kg/m²     Exam:    Gen: No distress. Eyes: PERRL. No sclera icterus. No conjunctival injection. ENT: No discharge. Pharynx clear.  External appearance of ears and nose normal.  Neck: Trachea midline. No obvious mass. Resp: No accessory muscle use. No crackles. No wheezes. + rhonchi. No dullness on percussion. CV: Regular rate. Regular rhythm. No murmur or rub. Trace edema. GI: Non-tender. Non-distended. No hernia. Skin: Warm, dry, normal texture and turgor. No nodule on exposed extremities. Lymph: No cervical LAD. No supraclavicular LAD. M/S: No cyanosis. No clubbing. No joint deformity. Neuro: Moves all four extremities. CN 2-12 tested, no defect noted. Psych: Oriented x 3. No anxiety. Awake. Alert. Intact judgement and insight. Data    LABS  CBC:   Recent Labs     10/24/20  0622 10/25/20  0534 10/26/20  0456   WBC 3.9* 4.7 4.3   HGB 9.2* 9.1* 9.2*   HCT 28.5* 27.7* 29.0*   MCV 86.7 85.7 86.9    162 165     BMP:   Recent Labs     10/24/20  0622 10/25/20  0534 10/26/20  0456   * 136 136   K 3.9 4.9 4.2   CL 94* 96* 96*   CO2 32 34* 32   BUN 14 10 11   CREATININE 0.9 0.9 0.8   GLUCOSE 89 88 97     POC GLUCOSE:    Recent Labs     10/25/20  1122 10/25/20  1626 10/25/20  2045 10/25/20  2141 10/26/20  0758   POCGLU 138* 153* 131* 147* 90     LIVER PROFILE:   Recent Labs     10/24/20  0622 10/25/20  0534 10/26/20  0456   AST 22 22 29   ALT 12 10 13   LABALBU 2.8* 2.8* 3.0*   BILITOT <0.2 <0.2 <0.2   ALKPHOS 73 69 79     PT/INR:   No results for input(s): PROTIME, INR in the last 72 hours. APTT:   No results for input(s): APTT in the last 72 hours. UA:No results for input(s): NITRITE, COLORU, PHUR, LABCAST, WBCUA, RBCUA, MUCUS, TRICHOMONAS, YEAST, BACTERIA, CLARITYU, SPECGRAV, LEUKOCYTESUR, UROBILINOGEN, BILIRUBINUR, BLOODU, GLUCOSEU, KETUA, AMORPHOUS in the last 72 hours. Microbiology:  Wound Culture: No results for input(s): WNDABS, ORG in the last 72 hours. Invalid input(s):  LABGRAM  Nasal Culture: No results for input(s): ORG, MRSAPCR in the last 72 hours. Blood Culture:   No results for input(s): BC, BLOODCULT2 in the last 72 hours.   Fungal Culture:   No results for input(s): FUNGSM in the last 72 hours. No results for input(s): FUNCXBLD in the last 72 hours. CSF Culture:  No results for input(s): COLORCSF, APPEARCSF, CFTUBE, CLOTCSF, WBCCSF, RBCCSF, NEUTCSF, NUMCELLSCSF, LYMPHSCSF, MONOCSF, GLUCCSF, VOLCSF in the last 72 hours. Respiratory Culture:  No results for input(s): Jose Brooke in the last 72 hours. AFB:No results for input(s): AFBSMEAR in the last 72 hours. Urine Culture  No results for input(s): LABURIN in the last 72 hours. RADIOLOGY:    VL Extremity Venous Bilateral   Final Result      CT CHEST PULMONARY EMBOLISM W CONTRAST   Final Result   Negative for acute pulmonary embolism. Multifocal ground-glass and consolidative airspace disease is suspicious for   infection, including atypical and viral pathogens (including COVID-19). Mild interstitial pulmonary edema. Mild subpleural reticular abnormality and bronchiolectasis, suggesting   underlying interstitial lung disease. Mild mediastinal and hilar lymphadenopathy, presumably reactive. XR CHEST PORTABLE   Final Result   Features of heart failure, with moderate interstitial pulmonary edema. Basilar opacities, favor atelectasis. Superimposed pneumonia or aspiration   could be present in the appropriate context.              CONSULTS:    IP CONSULT TO DIETITIAN  IP CONSULT TO CARDIOLOGY  IP CONSULT TO INFECTIOUS DISEASES  IP CONSULT TO PULMONOLOGY    ASSESSMENT AND PLAN:      Active Problems:    HTN (hypertension)    Hypertriglyceridemia    Coronary artery disease involving native coronary artery of native heart with angina pectoris (HCC)    Diabetes mellitus ([address]    COVID-19    Pneumonia    CHF exacerbation (HCC)    COPD exacerbation (HCC)    Acute on chronic respiratory failure ([address]    Sepsis ([address]    Edema of right lower extremity    Pneumonia due to COVID-19 virus    Chronic respiratory failure with hypoxia (HCC)    Asthma-COPD overlap syndrome ([address]    Interstitial lung disease ([facility]  Resolved Problems:    * No resolved hospital problems. *    Patient is a 81-year-old female with a past medical history of COPD, chronic respiratory failure, coronary artery disease, hypertension, previous CVA and CHF who presented with worsening shortness of breath and hypoxia. Patient was found to be hypoxic in the low 80s on 4 L of oxygen on admission. There was some concern of possible CHF and patient was diuresed. She started on dexamethasone. Acute on chronic hypoxic respiratory failure-worse now on 10 L of oxygen  -Secondary to COVID-19  -Wean oxygen as tolerated. Uses 2 L of oxygen    COVID-19 pneumonia  -Started on Decadron, day 6  -Infectious disease consult  -Patient's oxygen requirement is worse. Will start remdesivir.  -Discussed with infectious disease. They will order convalescent plasma if indicated     Possible bacterial pneumonia  -Procalcitonin elevated on admission. Treat as gram-negative/atypical day  -Continue antibiotics    COPR  -ct inhalers    Coronary artery disease with chest pain  -Continue home medications  -Troponin negative EKG shows no acute ST-T wave changes  -Patient states she is not a candidate for any kind of procedure. Will add nitro as needed    Possible CHF-ruled out - echo shows nrl EF   -Patient had some pitting edema on admission with interstitial edema on CT  -Patient's baseline weight documented as about 193 pounds. Patient is below her dry weight at present    DM2  -Diet controlled. Last A1c 6.5  -Sliding scale insulin    HTN  - ct home meds    Anxiety  -Resume home meds    Chronic pain  -Continue home medication    Discussed with family about goals of care. She is a full code for now. Daughter will talk to her about further details. DVT Prophylaxis: lovenox  Diet: DIET LOW SODIUM 2 GM; 1500 ml  Dietary Nutrition Supplements: Low Volume Supplement  Code Status: Full Code        Discharge plan -patient's oxygen requirement worse. Will need close monitoring. May need to be transferred to the ICU      The patient and / or the family were informed of the results of any tests, a time was given to answer questions, a plan was proposed and they agreed with plan. Discussed with consulting physicians, nursing and social work     The note was completed using EMR. Every effort was made to ensure accuracy; however, inadvertent computerized transcription errors may be present.        Akilah Bowen MD

## 2020-10-26 NOTE — TELEPHONE ENCOUNTER
Patient daughter calling back, hospital just called and they want to start her on a clinical trial of Remdesivir. She is asking what your thoughts are on that.       Please call, [phone]

## 2020-10-26 NOTE — PLAN OF CARE
Problem: Nutrition  Goal: Optimal nutrition therapy  10/26/2020 1157 by Freedom Johnson RD, LD  Outcome: Ongoing  10/26/2020 0656 by Filipe Arizmendi RN  Outcome: Ongoing   Nutrition Problem #1: Increased nutrient needs  Intervention: Food and/or Nutrient Delivery: Continue Current Diet, Continue Oral Nutrition Supplement  Nutritional Goals: consume >50% of meals and supplements this admission.

## 2020-10-26 NOTE — PROGRESS NOTES
Patient has  2 XL soft bm today, pt continues requesting laxatives, one on one education provided about excessive laxatives, pt redirectable at this time [facility]

## 2020-10-26 NOTE — PLAN OF CARE
Pt is a high fall risk. Bed in locked, low position with side rails up X 2 and an active bed alarm. Fall risk sign, socks, and bracelet in place. Pt oriented to her abilities and compliant with the use of her call light. Pt is a standby assist because she has a cullen and CMU box/wires. Pt has been on 7 L HFNC and normally wears 2 L NC PRN at home. Towards the end of the shift, pt's O2 sat was in the mid 80's on 7 L HFNC while pt resting in bed. Pt's HOB increased and pt encouraged to cough but still required 10 L HFNC to maintain O2 sat > 90%. Pt left on 10 L HFNC. Pt has crackles and rhonchi throughout. Pt coughs anytime she takes a deep breath.

## 2020-10-26 NOTE — PROGRESS NOTES
Comprehensive Nutrition Assessment    Type and Reason for Visit:  Reassess    Nutrition Recommendations/Plan:   Continue Low Na diet with 1500 mL FR. Monitor need to liberalize diet to encourage PO intakes. Continue ONS. Nutrition Assessment:  Follow-up. Pt COVID+, in isolation. Pt continues with poor appetite with intakes reflecting <50%. Supplement on board but only 1 good intake documented. Pt had worsening o2 yesterday and remains on 10 L oxygen. Will continue supplement. Malnutrition Assessment:  Malnutrition Status: At risk for malnutrition     Context:  Chronic Illness     Findings of the 6 clinical characteristics of malnutrition:  Energy Intake:  Mild decrease in energy intake  Weight Loss:  1 - Mild weight loss (specify amount and time period)(- ~6% weight loss)     Body Fat Loss:  Unable to assess     Muscle Mass Loss:  Unable to assess    Fluid Accumulation:  Unable to assess     Strength:  Not Performed    Estimated Daily Nutrient Needs:  Energy (kcal):  1666-4982 (20-25) CBW  Protein (g):  77-89 (1.3-1.5) IBW  Fluid (ml/day):  1500 mL    Nutrition Related Findings:  BM 10/26; +1 BLE edema      Wounds:  Stage I       Current Nutrition Therapies:    DIET LOW SODIUM 2 GM; 1500 ml  Dietary Nutrition Supplements: Low Volume Supplement    Anthropometric Measures:  · Height: 5' 6\" (167.6 cm)  · Current Body Weight: 186 lb (84.4 kg)   · Admission Body Weight: 198 lb (89.8 kg)    · Usual Body Weight: 198 lb (89.8 kg)(11/13/2019)     · Ideal Body Weight: 130 lbs; % Ideal Body Weight 143.1 %   · BMI: 30  · BMI Categories: Obese Class 1 (BMI 30.0-34. 9)       Nutrition Diagnosis:   · Increased nutrient needs related to increase demand for energy/nutrients(poor appetite) as evidenced by wounds      Nutrition Interventions:   Food and/or Nutrient Delivery:  Continue Current Diet, Continue Oral Nutrition Supplement  Nutrition Education/Counseling:  No recommendation at this time   Coordination of Nutrition Care:  Continued Inpatient Monitoring    Goals:  consume >50% of meals and supplements this admission. Nutrition Monitoring and Evaluation:   Food/Nutrient Intake Outcomes:  Food and Nutrient Intake, Supplement Intake  Physical Signs/Symptoms Outcomes:  Biochemical Data, Nutrition Focused Physical Findings, Skin, Weight, Fluid Status or Edema     Discharge Planning:     Too soon to determine     Electronically signed by Angie Aguilar RD, LD on 10/26/20 at 11:55 AM EDT    Contact: [phone]

## 2020-10-27 NOTE — PROGRESS NOTES
Pt to the commode and returned to bed. Vitals obtained and scheduled medication given. Pt watching TV in bed, in lowest position, call light within reach and bed alarm on. Consent obtained for plasma per orders. Pt expresses no further needs at this time.

## 2020-10-27 NOTE — CARE COORDINATION
Patient is currently on 15L of oxygen. Not ready for discharge. SW will continue to monitor for needs including oxygen and/or therapy if appropriate. Family will transport at discharge.      Respectfully submitted,     Symone SERNA, NAY-S  [facility]   [phone]     Electronically signed by KARINE Mcfarland on 10/27/2020 at 9:54 AM

## 2020-10-27 NOTE — PLAN OF CARE
Problem: Falls - Risk of:  Goal: Will remain free from falls  Description: Will remain free from falls  10/27/2020 1436 by Virginia Ramirez RN  Outcome: Ongoing     Problem: Falls - Risk of:  Goal: Absence of physical injury  Description: Absence of physical injury  10/27/2020 1436 by Virginia Ramirez RN  Outcome: Ongoing     Problem: Airway Clearance - Ineffective  Goal: Achieve or maintain patent airway  10/27/2020 1436 by Virginia Ramirez RN  Outcome: Ongoing     Problem: Gas Exchange - Impaired  Goal: Absence of hypoxia  10/27/2020 1436 by Virginia Ramirez RN  Outcome: Ongoing     Problem: Gas Exchange - Impaired  Goal: Promote optimal lung function  10/27/2020 1436 by Virginia Ramirez RN  Outcome: Ongoing     Problem: Breathing Pattern - Ineffective  Goal: Ability to achieve and maintain a regular respiratory rate  10/27/2020 1436 by Virginia Ramirez RN  Outcome: Ongoing     Problem: Body Temperature -  Risk of, Imbalanced  Goal: Ability to maintain a body temperature within defined limits  10/27/2020 1436 by Virginia Ramirez RN  Outcome: Ongoing     Problem: Body Temperature -  Risk of, Imbalanced  Goal: Will regain or maintain usual level of consciousness  10/27/2020 1436 by Virginia Ramirez RN  Outcome: Ongoing     Problem:  Body Temperature -  Risk of, Imbalanced  Goal: Complications related to the disease process, condition or treatment will be avoided or minimized  10/27/2020 1436 by Virginia Ramirez RN  Outcome: Ongoing     Problem: Isolation Precautions - Risk of Spread of Infection  Goal: Prevent transmission of infection  10/27/2020 1436 by Virginia Ramirez RN  Outcome: Ongoing     Problem: Nutrition Deficits  Goal: Optimize nutrtional status  10/27/2020 1436 by Virginia Ramirez RN  Outcome: Ongoing     Problem: Risk for Fluid Volume Deficit  Goal: Maintain normal heart rhythm  10/27/2020 1436 by Virginia Ramirez RN  Outcome: Ongoing     Problem: Risk for Fluid Volume Deficit  Goal: Maintain absence of muscle cramping  10/27/2020 1436 by Kenny Calzada RN  Outcome: Ongoing     Problem: Risk for Fluid Volume Deficit  Goal: Maintain normal serum potassium, sodium, calcium, phosphorus, and pH  10/27/2020 1436 by Kenny Calzada RN  Outcome: Ongoing     Problem: Loneliness or Risk for Loneliness  Goal: Demonstrate positive use of time alone when socialization is not possible  10/27/2020 1436 by Kenny Calzada RN  Outcome: Ongoing     Problem: Fatigue  Goal: Verbalize increase energy and improved vitality  10/27/2020 1436 by Kenny Calzada RN  Outcome: Ongoing     Problem: Patient Education: Go to Patient Education Activity  Goal: Patient/Family Education  10/27/2020 1436 by Kenny Calzada RN  Outcome: Ongoing     Problem: OXYGENATION/RESPIRATORY FUNCTION  Goal: Patient will maintain patent airway  10/27/2020 1436 by Kenny Calzada RN  Outcome: Ongoing     Problem: OXYGENATION/RESPIRATORY FUNCTION  Goal: Patient will achieve/maintain normal respiratory rate/effort  Description: Respiratory rate and effort will be within normal limits for the patient  10/27/2020 1436 by Kenny Calzada RN  Outcome: Ongoing     Problem: HEMODYNAMIC STATUS  Goal: Patient has stable vital signs and fluid balance  10/27/2020 1436 by Kenny Calzada RN  Outcome: Ongoing     Problem: FLUID AND ELECTROLYTE IMBALANCE  Goal: Fluid and electrolyte balance are achieved/maintained  10/27/2020 1436 by Kenny Calzada RN  Outcome: Ongoing     Problem: ACTIVITY INTOLERANCE/IMPAIRED MOBILITY  Goal: Mobility/activity is maintained at optimum level for patient  10/27/2020 1436 by Kenny Calzada RN  Outcome: Ongoing     Problem: Nutrition  Goal: Optimal nutrition therapy  10/27/2020 1436 by Kenny Calzada RN  Outcome: Ongoing     Problem: Pain:  Goal: Pain level will decrease  Description: Pain level will decrease  10/27/2020 1436 by Kenny Calzada RN  Outcome: Ongoing     Problem: Pain:  Goal: Control of acute pain  Description: Control of acute pain  10/27/2020 1436 by Sadaf Cervantes RN  Outcome: Ongoing     Problem: Pain:  Goal: Control of chronic pain  Description: Control of chronic pain  10/27/2020 1436 by Sadaf Cervantes RN  Outcome: Ongoing     Problem: Discharge Planning:  Goal: Discharged to appropriate level of care  Description: Discharged to appropriate level of care  10/27/2020 1436 by Sadaf Cervantes RN  Outcome: Ongoing     Problem: Skin Integrity:  Goal: Will show no infection signs and symptoms  Description: Will show no infection signs and symptoms  10/27/2020 1436 by Sadaf Cervantes RN  Outcome: Ongoing     Problem: Skin Integrity:  Goal: Absence of new skin breakdown  Description: Absence of new skin breakdown  10/27/2020 1436 by Sadaf Cervantes RN  Outcome: Ongoing

## 2020-10-27 NOTE — PLAN OF CARE
Pt is a high fall risk. Bed in locked, low position with side rails up X 2 with an active bed alarm. Pt is not necessarily weak but she does have PINEDO. Pt's F/C removed and a BSC placed at bedside. Prior to ambulating pt to [facility] to void, pt's O2 was increased to 15 L HFNC to hyper-oxygenate her. Pt did not desaturate during transfer to and from [facility]. She was able to void while up to the [facility]. Fall risk bracelet, socks, and sign in place. Pt calls appropriately and doesn't attempt to get OOB by herself. RN attempted to wean pt's O2 w/o success. Pt increased from 10 L HFNC to 12 L HFNC when she couldn't recover after activity in the bed. When RN noted pt was 97% on 12 L HFNC, RN attempted to wean pt down to 8 L HFNC. Pt's O2 sats initially held in the low 90's but then she dropped into the 80's. Pt increased back up to 11 L HFNC and was able to maintain O2 sat in the low 90's. Pt has crackles t/o and rhonchi. Pt encouraged to do deep breathing, cough and reposition frequently in bed while awake. Pt noted to desaturate into the 80's when she talks for prolonged periods or moves. Pt hyper-oxygenated to 15 L HFNC for physical activity and has not desaturated during the activity. Pt states she didn't eat a lot today. Pt encouraged to drink her supplements if she cannot eat a meal.    Pt has denied pain t/o shift and has been afebrile. Pt c/o CP and was medicated with PRN nitroglycerin per her request. Pt's CP noted to resolve after 1 dose of nitroglycerin.

## 2020-10-27 NOTE — PROGRESS NOTES
Pulmonary Progress Note    Date of Admission: 10/20/2020   LOS: 6 days       CC:  COVID-19 hypoxemia    Subjective:  Pulmonary and infectious disease signed off October 23 when she was doing better. Unfortunately, she continued to deteriorate. She is now is requiring 12 to 15 L nasal cannula with a worsening chest x-ray. Continues to have cough with productive phlegm. No fever chills night sweats. ROS:   No nausea  No Vomiting  No chest pain        PHYSICAL EXAM:   Blood pressure (!) 104/38, pulse 70, temperature 99.7 °F (37.6 °C), temperature source Oral, resp. rate 18, height 5' 6\" (1.676 m), weight 186 lb 4.6 oz (84.5 kg), SpO2 94 %, not currently breastfeeding.'  Gen: No distress. Eyes: PERRL. No sclera icterus. No conjunctival injection. ENT: No discharge. Pharynx clear. External appearance of ears and nose normal.  Neck: Trachea midline. No obvious mass. Resp: No accessory muscle use. + crackles. + wheezes. ++ rhonchi. CV: Regular rate. Regular rhythm. No murmur or rub. No edema. GI: Non-tender. Non-distended. No hernia. Skin: Warm, dry, normal texture and turgor. No nodule on exposed extremities. Lymph: No cervical LAD. No supraclavicular LAD. M/S: No cyanosis. No clubbing. No joint deformity. Neuro: Moves all four extremities. Psych: Oriented x 3. No anxiety. Awake. Alert. Intact judgement and insight.       Medications:    Scheduled Meds:   sodium chloride  20 mL Intravenous Once    remdesivir IVPB  100 mg Intravenous Q24H    carBAMazepine  300 mg Oral BID    polyethylene glycol  17 g Oral Daily    levoFLOXacin  750 mg Oral Daily    psyllium  1 packet Oral TID     sodium chloride flush  10 mL Intravenous 2 times per day    enoxaparin  30 mg Subcutaneous BID    aspirin  81 mg Oral Daily    atorvastatin  80 mg Oral Daily    levothyroxine  100 mcg Oral Daily    metoprolol tartrate  25 mg Oral BID    pantoprazole  20 mg Oral Daily    dexamethasone  6 mg Oral Daily WITHOUT CONTRAST 1/19/2019 11:52 am    TECHNIQUE:  CT of the chest was performed without the administration of intravenous  contrast. Multiplanar reformatted images are provided for review. Dose  modulation, iterative reconstruction, and/or weight based adjustment of the  mA/kV was utilized to reduce the radiation dose to as low as reasonably  achievable. COMPARISON:  03/16/2010    HISTORY:  ORDERING SYSTEM PROVIDED HISTORY: CHEST WALL PAIN  TECHNOLOGIST PROVIDED HISTORY:  Ordering Physician Provided Reason for Exam: pt has pain in her right  posterior ribs s/p falling onto her right side on 01/17  Acuity: Acute  Type of Exam: Initial  Mechanism of Injury: fall onto right side  Relevant Medical/Surgical History: COPD, CAD, diabetes, cholecystectomy    FINDINGS:  Mediastinum: Visualized portions of the thyroid gland are unremarkable. There is a conventional branching pattern to the great vessels. No discrete  mediastinal or hilar lymphadenopathy identified. The heart is normal in size  without pericardial fluid collection. Lungs/pleura: Evaluation of the lung parenchyma is somewhat limited due to  motion artifact. Peripheral lower lobe opacities bilaterally favoring  atelectasis/scarring. No definite pleural effusion or pneumothorax. Linear  scarring/atelectasis within the lingula also noted. Mild generalized  interstitial prominence. No definite bronchiectasis. No discrete pulmonary  nodules or masses. Central airways are patent. Upper Abdomen: Status post cholecystectomy. Soft Tissues/Bones: Nondisplaced fractures of the right 2nd through 4th ribs  anterolaterally, limited by motion artifact and possibly subacute/chronic. No left-sided rib fractures are identified. Impression Bilateral lower lobe peripheral opacities, favoring atelectasis/scarring. Similar findings noted within the lingula. No definite pleural effusion or  pneumothorax.     Age-indeterminate fractures of the right 2nd for  infection, including atypical and viral pathogens (including COVID-19). Mild interstitial pulmonary edema. Mild subpleural reticular abnormality and bronchiolectasis, suggesting  underlying interstitial lung disease. Mild mediastinal and hilar lymphadenopathy, presumably reactive. CXR PA/LAT:   Results for orders placed in visit on 09/14/20   XR CHEST (2 VW)    Narrative Site: [facility] Alegre #: [id number] #: [id number]: [place] #: [de-identified] #: [id number] #: [id number]: XR CHEST PA AND LATERALExam Date/Time: 09/14/2020 05:30 PMAdmitting Diagnosis: Reason for Exam:  Dictated by: Mynor Spence LILY: 09/14/2020 05:47 PMT: This document is confidential medical information. Unauthorized disclosure or use of this information is prohibited by law. If you are not the intended recipient of this document, please advise   us by calling immediately [phone]. Impression/Conclusion below    HISTORY: Postnasal drip;Chronic obstructive pulmonary disease, unspecified; Other specified   disorders of nose and nasal sinuses  COMPARISON: 8/19/2019  NOTE:  If there are questions about the content of this report, please contact [facility]   radiology by calling [phone]    FINDINGS:  LINES/DEVICES: Stable implanted electronic device anterior chest.  LUNGS/PLEURA:  Stable chronic interstitial changes of the lungs consistent with fibrosis. Appearance similar to 8/19/2019. No acute airspace disease. HEART:  Enlarged  MEDIASTINUM/MARIANELA:  Unremarkable  BONES:  Osteopenia  OTHER:  None        IMPRESSION:    Stable chronic interstitial changes of the lungs consistent with history of fibrosis. SIGNED BY: Aida Grant DO on 9/14/2020  5:44 PM       [facility] - [address] [facility] [phone] -  [facility] Call Center: [phone]            CXR portable:   Results for orders placed during the hospital encounter of 10/20/20   XR CHEST note was transcribed using [id number] [employer]. Please disregard any translational errors.       Tristian Ledezma [facility], [facility] and [facility] [phone]

## 2020-10-27 NOTE — PROGRESS NOTES
Progress Note  Admit Date: 10/20/2020      PCP: Barrera Mcclelland MD     CC: F/U for COVID    Days in hospital:  6    SUBJECTIVE / Interval History:  Patient feels okay. Needing 15   L of oxygen        Allergies  Acetaminophen-codeine and Codeine    Medications    Scheduled Meds:   remdesivir IVPB  100 mg Intravenous Q24H    carBAMazepine  300 mg Oral BID    polyethylene glycol  17 g Oral Daily    levoFLOXacin  750 mg Oral Daily    psyllium  1 packet Oral TID WC    sodium chloride flush  10 mL Intravenous 2 times per day    enoxaparin  30 mg Subcutaneous BID    aspirin  81 mg Oral Daily    atorvastatin  80 mg Oral Daily    levothyroxine  100 mcg Oral Daily    metoprolol tartrate  25 mg Oral BID    pantoprazole  20 mg Oral Daily    dexamethasone  6 mg Oral Daily    lisinopril  5 mg Oral Daily    amLODIPine  2.5 mg Oral Daily    isosorbide mononitrate  60 mg Oral Daily    montelukast  10 mg Oral Nightly    insulin lispro  0-12 Units Subcutaneous TID WC    insulin lispro  0-6 Units Subcutaneous Nightly    ranolazine  1,000 mg Oral BID    budesonide-formoterol  2 puff Inhalation BID    albuterol-ipratropium  1 puff Inhalation 4x Daily    pregabalin  50 mg Oral Nightly     Continuous Infusions:   dextrose         PRN Meds:  nitroGLYCERIN, sodium chloride, sodium chloride flush, acetaminophen **OR** acetaminophen, ALPRAZolam, perflutren lipid microspheres, ondansetron, guaiFENesin-dextromethorphan, albuterol sulfate HFA, oxyCODONE-acetaminophen, glucose, dextrose, glucagon (rDNA), dextrose    Vitals    BP (!) 110/58   Pulse 86   Temp 100.4 °F (38 °C) (Oral)   Resp 18   Ht 5' 6\" (1.676 m)   Wt 186 lb 4.6 oz (84.5 kg)   SpO2 94%   BMI 30.07 kg/m²     Exam:    Gen: No distress. Eyes: PERRL. No sclera icterus. No conjunctival injection. ENT: No discharge. Pharynx clear. External appearance of ears and nose normal.  Neck: Trachea midline. No obvious mass. Resp: No accessory muscle use.  No crackles. No wheezes. + rhonchi. No dullness on percussion. CV: Regular rate. Regular rhythm. No murmur or rub. Trace edema. GI: Non-tender. Non-distended. No hernia. Skin: Warm, dry, normal texture and turgor. No nodule on exposed extremities. Lymph: No cervical LAD. No supraclavicular LAD. M/S: No cyanosis. No clubbing. No joint deformity. Neuro: Moves all four extremities. CN 2-12 tested, no defect noted. Psych: Oriented x 3. No anxiety. Awake. Alert. Intact judgement and insight. Data    LABS  CBC:   Recent Labs     10/25/20  0534 10/26/20  0456 10/27/20  0542   WBC 4.7 4.3 5.4   HGB 9.1* 9.2* 9.2*   HCT 27.7* 29.0* 28.4*   MCV 85.7 86.9 85.7    165 177     BMP:   Recent Labs     10/25/20  0534 10/26/20  0456 10/27/20  0542    136 135*   K 4.9 4.2 4.7   CL 96* 96* 97*   CO2 34* 32 31   BUN 10 11 13   CREATININE 0.9 0.8 0.8   GLUCOSE 88 97 83     POC GLUCOSE:    Recent Labs     10/26/20  0758 10/26/20  1150 10/26/20  1645 10/26/20  2034 10/27/20  0905   POCGLU 90 101* 182* 189* 118*     LIVER PROFILE:   Recent Labs     10/25/20  0534 10/26/20  0456 10/27/20  0542   AST 22 29 29   ALT 10 13 12   LABALBU 2.8* 3.0* 2.9*   BILITOT <0.2 <0.2 0.3   ALKPHOS 69 79 83     PT/INR:   No results for input(s): PROTIME, INR in the last 72 hours. APTT:   No results for input(s): APTT in the last 72 hours. UA:No results for input(s): NITRITE, COLORU, PHUR, LABCAST, WBCUA, RBCUA, MUCUS, TRICHOMONAS, YEAST, BACTERIA, CLARITYU, SPECGRAV, LEUKOCYTESUR, UROBILINOGEN, BILIRUBINUR, BLOODU, GLUCOSEU, KETUA, AMORPHOUS in the last 72 hours. Microbiology:  Wound Culture: No results for input(s): WNDABS, ORG in the last 72 hours. Invalid input(s):  LABGRAM  Nasal Culture: No results for input(s): ORG, MRSAPCR in the last 72 hours. Blood Culture:   No results for input(s): BC, BLOODCULT2 in the last 72 hours. Fungal Culture:   No results for input(s): FUNGSM in the last 72 hours.   No results for input(s): FUNCXBLD in the last 72 hours. CSF Culture:  No results for input(s): COLORCSF, APPEARCSF, CFTUBE, CLOTCSF, WBCCSF, RBCCSF, NEUTCSF, NUMCELLSCSF, LYMPHSCSF, MONOCSF, GLUCCSF, VOLCSF in the last 72 hours. Respiratory Culture:  No results for input(s): Pamla Jaclyn in the last 72 hours. AFB:No results for input(s): AFBSMEAR in the last 72 hours. Urine Culture  No results for input(s): LABURIN in the last 72 hours. RADIOLOGY:    VL Extremity Venous Bilateral   Final Result      CT CHEST PULMONARY EMBOLISM W CONTRAST   Final Result   Negative for acute pulmonary embolism. Multifocal ground-glass and consolidative airspace disease is suspicious for   infection, including atypical and viral pathogens (including COVID-19). Mild interstitial pulmonary edema. Mild subpleural reticular abnormality and bronchiolectasis, suggesting   underlying interstitial lung disease. Mild mediastinal and hilar lymphadenopathy, presumably reactive. XR CHEST PORTABLE   Final Result   Features of heart failure, with moderate interstitial pulmonary edema. Basilar opacities, favor atelectasis. Superimposed pneumonia or aspiration   could be present in the appropriate context.              CONSULTS:    IP CONSULT TO DIETITIAN  IP CONSULT TO CARDIOLOGY  IP CONSULT TO INFECTIOUS DISEASES  IP CONSULT TO PULMONOLOGY  IP CONSULT TO PHARMACY    ASSESSMENT AND PLAN:      Active Problems:    HTN (hypertension)    Hypertriglyceridemia    Coronary artery disease involving native coronary artery of native heart with angina pectoris (HCC)    Diabetes mellitus ([address]    COVID-19    Pneumonia    CHF exacerbation (HCC)    COPD exacerbation (HCC)    Acute on chronic respiratory failure (HCC)    Sepsis ([address]    Edema of right lower extremity    Pneumonia due to COVID-19 virus    Chronic respiratory failure with hypoxia (HCC)    Asthma-COPD overlap syndrome ([address]    Interstitial lung disease ([address]  Resolved Problems:    * No resolved hospital problems. *    Patient is a 81-year-old female with a past medical history of COPD, chronic respiratory failure, coronary artery disease, hypertension, previous CVA and CHF who presented with worsening shortness of breath and hypoxia. Patient was found to be hypoxic in the low 80s on 4 L of oxygen on admission. There was some concern of possible CHF and patient was diuresed. She started on dexamethasone. Acute on chronic hypoxic respiratory failure-worse now on 15 L of oxygen  -Secondary to COVID-19  -Wean oxygen as tolerated. Uses 2 L of oxygen  -pro rayna ok   - CXR ordered   -pulm consulted    COVID-19 pneumonia  -Started on Decadron, day 7  -Infectious disease consult  - remdesivir day 2  -Discussed with infectious disease. They will order convalescent plasma if indicated     Possible bacterial pneumonia  -Procalcitonin elevated on admission. Treat as gram-negative/atypical day  -Continue antibiotics    COPR  -ct inhalers    Coronary artery disease with chest pain  -Continue home medications  -Troponin negative EKG shows no acute ST-T wave changes  -Patient states she is not a candidate for any kind of procedure. Will add nitro as needed    Possible CHF-ruled out - echo shows nrl EF   -Patient had some pitting edema on admission with interstitial edema on CT  -Patient's baseline weight documented as about 193 pounds. Patient is below her dry weight at present    DM2  -Diet controlled. Last A1c 6.5  -Sliding scale insulin    HTN  - ct home meds    Anxiety  -Resume home meds    Chronic pain  -Continue home medication    Discussed with family about goals of care. She is a full code for now. Daughter will talk to her about further details. DVT Prophylaxis: lovenox  Diet: DIET LOW SODIUM 2 GM; 1500 ml  Dietary Nutrition Supplements: Low Volume Supplement  Code Status: Full Code        Discharge plan -patient's oxygen requirement worse. Will need close monitoring. May need to be transferred to the ICU      The patient and / or the family were informed of the results of any tests, a time was given to answer questions, a plan was proposed and they agreed with plan. Discussed with consulting physicians, nursing and social work     The note was completed using EMR. Every effort was made to ensure accuracy; however, inadvertent computerized transcription errors may be present.        Akilah Bowen MD

## 2020-10-27 NOTE — PROGRESS NOTES
Pt desaturated on 12 L HFNC into the 80's. Pt states she was having trouble breathing and had recently been coughing a lot. Pt increased to 15 L HFNC. Pt given albuterol inhaler and cough syrup. Pt turned to R side. Pt's O2 sat > 90% on 15 L HFNC. Pt stated she was comfortable prior to RN leaving the room and breathing easier. RT made aware and stated she would be up to see the pt.

## 2020-10-27 NOTE — PROGRESS NOTES
Pt to the bedside commode and returned to bed. Pt semi-fowlers in bed and shifts self. Vitals, shift assessment and medications completed. Pt alert and oriented x4 and calls appropriately for assistance. Pt set up for breakfast. Pt remains in bed, in the lowest position, call light within reach and bed alarm on. Pt expresses no further needs at this time.

## 2020-10-28 NOTE — CONSULTS
[facility]  Palliative Care   Consult Note    NAME:  Stewart Rod [address] RECORD NUMBER:  [id number]  AGE: 81 y.o. GENDER: female  : 1935  TODAY'S DATE:  10/28/2020    Subjective     Reason for Consult:  goals of care and code status   Visit Type: Initial Consult      Marco A Mulligan is a 81 y.o. female referred by:  [x] Physician    PAST MEDICAL HISTORY      Diagnosis Date    Arthritis     COPD [facility] obstructive [facility] disease) [facility]     Heart disease     Hypertension     Lung disease     Renal insufficiency     Stroke ([facility]        PAST SURGICAL HISTORY  Past Surgical History:   Procedure Laterality Date    CATARACT REMOVAL      CHOLECYSTECTOMY         FAMILY HISTORY  Family History   Problem Relation Age of Onset    Cancer Mother     Tuberculosis Father     Cancer Sister     Heart Disease Brother     Cancer Brother     Heart Attack Brother        SOCIAL HISTORY  Social History     Tobacco Use    Smoking status: Former Smoker     Last attempt to quit: 1982     Years since quittin.8    Smokeless tobacco: Never Used   Substance Use Topics    Alcohol use: No     Alcohol/week: 0.0 standard drinks    Drug use: No       ALLERGIES  Allergies   Allergen Reactions    Acetaminophen-Codeine Nausea Only    Codeine        MEDICATIONS  No current facility-administered medications on file prior to encounter.       Current Outpatient Medications on File Prior to Encounter   Medication Sig Dispense Refill    fluticasone-salmeterol (ADVAIR) 500-50 MCG/DOSE diskus inhaler Inhale 1 puff into the lungs every 12 hours      azithromycin (ZITHROMAX) 500 MG tablet Take 500 mg by mouth three times a week MWF      ipratropium-albuterol (DUONEB) 0.5-2.5 (3) MG/3ML SOLN nebulizer solution Inhale 1 vial into the lungs 2 times daily      benzonatate (TESSALON) 100 MG capsule Take 100 mg by mouth 3 times daily as needed for Cough      allopurinol (ZYLOPRIM) 300 MG Home with Hospice Care   [] ECF with Hospice  [] ECF skilled care with Hospice to follow   [] Other:    Discussion: Patient with advanced COPD admitted for worsening shortness of breath, CHF, CVA and now COVID 23. There was some question as to her current code status. I have called the patient she is alert and oriented and agrees if she gets worse needs CPR or ventilator she does not want these things, she wants to die a peaceful death. I have spoken to her daughter she agrees she with her mother's decision. Dr. Sebastian Beaulieu informed of above. I will continue to follow Ms. Inman's care as needed. Thank you for allowing me to participate in the care of Ms. Terrace Riedel .      Electronically signed by Evan Joel RN, [address] [place] [address] on 10/28/2020 at 12:00 PM  [address] [place] [address]  Office: [phone]

## 2020-10-28 NOTE — PROGRESS NOTES
Progress Note  Admit Date: 10/20/2020      PCP: Terese Messer MD     CC: F/U for COVID    Days in hospital:  7    SUBJECTIVE / Interval Histo ry:  Patient feels okay. Hypoxic overnight, placed on vapotherm     Patient stated that she did not want intubation resuscitation. She is awake alert. She states that she has a bad heart and also has bad lungs and does not want any kind of breathing intervention or resuscitation.   When explained to her respiratory failure could get worse patient states she would like to be kept comfortable if that happens      Allergies  Acetaminophen-codeine and Codeine    Medications    Scheduled Meds:   remdesivir IVPB  100 mg Intravenous Q24H    carBAMazepine  300 mg Oral BID    polyethylene glycol  17 g Oral Daily    levoFLOXacin  750 mg Oral Daily    psyllium  1 packet Oral TID WC    sodium chloride flush  10 mL Intravenous 2 times per day    enoxaparin  30 mg Subcutaneous BID    aspirin  81 mg Oral Daily    atorvastatin  80 mg Oral Daily    levothyroxine  100 mcg Oral Daily    metoprolol tartrate  25 mg Oral BID    pantoprazole  20 mg Oral Daily    dexamethasone  6 mg Oral Daily    lisinopril  5 mg Oral Daily    amLODIPine  2.5 mg Oral Daily    isosorbide mononitrate  60 mg Oral Daily    montelukast  10 mg Oral Nightly    insulin lispro  0-12 Units Subcutaneous TID WC    insulin lispro  0-6 Units Subcutaneous Nightly    ranolazine  1,000 mg Oral BID    budesonide-formoterol  2 puff Inhalation BID    albuterol-ipratropium  1 puff Inhalation 4x Daily    pregabalin  50 mg Oral Nightly     Continuous Infusions:   dextrose         PRN Meds:  nitroGLYCERIN, sodium chloride, sodium chloride flush, acetaminophen **OR** acetaminophen, ALPRAZolam, perflutren lipid microspheres, ondansetron, guaiFENesin-dextromethorphan, albuterol sulfate HFA, oxyCODONE-acetaminophen, glucose, dextrose, glucagon (rDNA), dextrose    Vitals    BP (!) 120/58   Pulse 83   Temp 99.6 °F (37.6 °C) (Axillary)   Resp 24   Ht 5' 6\" (1.676 m)   Wt 185 lb 10 oz (84.2 kg)   SpO2 93%   BMI 29.96 kg/m²     Exam:    Gen: No distress. Eyes: PERRL. No sclera icterus. No conjunctival injection. ENT: No discharge. Pharynx clear. External appearance of ears and nose normal.  Neck: Trachea midline. No obvious mass. Resp: No accessory muscle use. No crackles. No wheezes. + rhonchi. No dullness on percussion. CV: Regular rate. Regular rhythm. No murmur or rub. Trace edema. GI: Non-tender. Non-distended. No hernia. Skin: Warm, dry, normal texture and turgor. No nodule on exposed extremities. Lymph: No cervical LAD. No supraclavicular LAD. M/S: No cyanosis. No clubbing. No joint deformity. Neuro: Moves all four extremities. CN 2-12 tested, no defect noted. Psych: Oriented x 3. No anxiety. Awake. Alert. Intact judgement and insight. Data    LABS  CBC:   Recent Labs     10/26/20  0456 10/27/20  0542 10/28/20  0622   WBC 4.3 5.4 7.1   HGB 9.2* 9.2* 9.1*   HCT 29.0* 28.4* 28.4*   MCV 86.9 85.7 85.8    177 193     BMP:   Recent Labs     10/26/20  0456 10/27/20  0542 10/28/20  0623    135* 130*   K 4.2 4.7 4.3   CL 96* 97* 91*   CO2 32 31 28   BUN 11 13 13   CREATININE 0.8 0.8 0.9   GLUCOSE 97 83 97     POC GLUCOSE:    Recent Labs     10/27/20  0905 10/27/20  1159 10/27/20  1615 10/27/20  2126 10/28/20  0745   POCGLU 118* 174* 140* 139* 112*     LIVER PROFILE:   Recent Labs     10/26/20  0456 10/27/20  0542 10/28/20  0623   AST 29 29 36   ALT 13 12 15   LABALBU 3.0* 2.9* 2.9*   BILITOT <0.2 0.3 0.3   ALKPHOS 79 83 97     PT/INR:   No results for input(s): PROTIME, INR in the last 72 hours. APTT:   No results for input(s): APTT in the last 72 hours.   UA:No results for input(s): NITRITE, COLORU, PHUR, LABCAST, WBCUA, RBCUA, MUCUS, TRICHOMONAS, YEAST, BACTERIA, CLARITYU, SPECGRAV, LEUKOCYTESUR, UROBILINOGEN, BILIRUBINUR, BLOODU, GLUCOSEU, KETUA, AMORPHOUS in the last 72 hours.  Microbiology:  Wound Culture: No results for input(s): WNDABS, ORG in the last 72 hours. Invalid input(s):  LABGRAM  Nasal Culture: No results for input(s): ORG, MRSAPCR in the last 72 hours. Blood Culture:   No results for input(s): BC, BLOODCULT2 in the last 72 hours. Fungal Culture:   No results for input(s): FUNGSM in the last 72 hours. No results for input(s): FUNCXBLD in the last 72 hours. CSF Culture:  No results for input(s): COLORCSF, APPEARCSF, CFTUBE, CLOTCSF, WBCCSF, RBCCSF, NEUTCSF, NUMCELLSCSF, LYMPHSCSF, MONOCSF, GLUCCSF, VOLCSF in the last 72 hours. Respiratory Culture:  No results for input(s): Dasie Watts in the last 72 hours. AFB:No results for input(s): AFBSMEAR in the last 72 hours. Urine Culture  No results for input(s): LABURIN in the last 72 hours. RADIOLOGY:    XR CHEST PORTABLE   Final Result   New multifocal peripheral ground-glass opacification, concerning for   pneumonia including atypical viral pneumonia. VL Extremity Venous Bilateral   Final Result      CT CHEST PULMONARY EMBOLISM W CONTRAST   Final Result   Negative for acute pulmonary embolism. Multifocal ground-glass and consolidative airspace disease is suspicious for   infection, including atypical and viral pathogens (including COVID-19). Mild interstitial pulmonary edema. Mild subpleural reticular abnormality and bronchiolectasis, suggesting   underlying interstitial lung disease. Mild mediastinal and hilar lymphadenopathy, presumably reactive. XR CHEST PORTABLE   Final Result   Features of heart failure, with moderate interstitial pulmonary edema. Basilar opacities, favor atelectasis. Superimposed pneumonia or aspiration   could be present in the appropriate context.              CONSULTS:    IP CONSULT TO DIETITIAN  IP CONSULT TO CARDIOLOGY  IP CONSULT TO INFECTIOUS DISEASES  IP CONSULT TO PULMONOLOGY  IP CONSULT TO PHARMACY    ASSESSMENT AND PLAN:      Active Problems:    HTN (hypertension)    Hypertriglyceridemia    Coronary artery disease involving native coronary artery of native heart with angina pectoris (HCC)    Diabetes mellitus ([facility]    COVID-19    Pneumonia    CHF exacerbation (HCC)    COPD exacerbation (HCC)    Acute on chronic respiratory failure (HCC)    Sepsis ([facility]    Edema of right lower extremity    Pneumonia due to COVID-19 virus    Chronic respiratory failure with hypoxia (HCC)    Asthma-COPD overlap syndrome ([facility]    Interstitial lung disease ([facility]  Resolved Problems:    * No resolved hospital problems. *    Patient is a 81-year-old female with a past medical history of COPD, chronic respiratory failure, coronary artery disease, hypertension, previous CVA and CHF who presented with worsening shortness of breath and hypoxia. Patient was found to be hypoxic in the low 80s on 4 L of oxygen on admission. There was some concern of possible CHF and patient was diuresed. She started on dexamethasone. Acute on chronic hypoxic respiratory failure-worse now on vapotherm  -Secondary to COVID-19  -Wean oxygen as tolerated. Uses 2 L of oxygen  -pro rayna ok   - CXR shows multifocal pneumonia  -pulm consulted    COVID-19 pneumonia  -Started on Decadron, day 8  -Infectious disease consult  - remdesivir day 3  - convalescent plasma given 10/27      Possible bacterial pneumonia  -Procalcitonin elevated on admission, now improved . Treat as gram-negative/atypical day  -Continue antibiotics     COPR  -ct inhalers    Coronary artery disease with chest pain  -Continue home medications  -Troponin negative EKG shows no acute ST-T wave changes  -Patient states she is not a candidate for any kind of procedure. Will add nitro as needed    Possible CHF-ruled out - echo shows nrl EF   -Patient had some pitting edema on admission with interstitial edema on CT  -Patient's baseline weight documented as about 193 pounds.   Patient is below her dry weight at present    DM2  -Diet controlled. Last A1c 6.5  -Sliding scale insulin    HTN  - ct home meds    Anxiety  -Resume home meds    Chronic pain  -Continue home medication    Code changed to limited code per patient wishes. DVT Prophylaxis: lovenox  Diet: DIET LOW SODIUM 2 GM; 1500 ml  Dietary Nutrition Supplements: Low Volume Supplement  Code Status: Full Code        Discharge plan -unclear, possibly needs hospice      The patient and / or the family were informed of the results of any tests, a time was given to answer questions, a plan was proposed and they agreed with plan. Discussed with consulting physicians, nursing and social work     The note was completed using EMR. Every effort was made to ensure accuracy; however, inadvertent computerized transcription errors may be present.        Saman Conte MD

## 2020-10-28 NOTE — PLAN OF CARE
Problem: Falls - Risk of:  Goal: Will remain free from falls  Description: Will remain free from falls  10/28/2020 1319 by Brandon Lutz RN  Outcome: Ongoing     Problem: Falls - Risk of:  Goal: Absence of physical injury  Description: Absence of physical injury  10/28/2020 1319 by Brandon Lutz RN  Outcome: Ongoing     Problem: Airway Clearance - Ineffective  Goal: Achieve or maintain patent airway  10/28/2020 1319 by Brandon Lutz RN  Outcome: Ongoing     Problem: Gas Exchange - Impaired  Goal: Absence of hypoxia  10/28/2020 1319 by Brandon Lutz RN  Outcome: Ongoing     Problem: Gas Exchange - Impaired  Goal: Promote optimal lung function  10/28/2020 1319 by Brandon Lutz RN  Outcome: Ongoing     Problem: Breathing Pattern - Ineffective  Goal: Ability to achieve and maintain a regular respiratory rate  10/28/2020 1319 by Brandon Lutz RN  Outcome: Ongoing     Problem: Body Temperature -  Risk of, Imbalanced  Goal: Ability to maintain a body temperature within defined limits  10/28/2020 1319 by Brandon Lutz RN  Outcome: Ongoing     Problem: Body Temperature -  Risk of, Imbalanced  Goal: Will regain or maintain usual level of consciousness  10/28/2020 1319 by Brandon Lutz RN  Outcome: Ongoing     Problem:  Body Temperature -  Risk of, Imbalanced  Goal: Complications related to the disease process, condition or treatment will be avoided or minimized  10/28/2020 1319 by Brandon Lutz RN  Outcome: Ongoing     Problem: Isolation Precautions - Risk of Spread of Infection  Goal: Prevent transmission of infection  10/28/2020 1319 by Brandon Lutz RN  Outcome: Ongoing     Problem: Nutrition Deficits  Goal: Optimize nutrtional status  10/28/2020 1319 by Brandon Lutz RN  Outcome: Ongoing     Problem: Risk for Fluid Volume Deficit  Goal: Maintain normal heart rhythm  10/28/2020 1319 by Brandon Lutz RN  Outcome: Ongoing     Problem: Risk for Fluid Volume Deficit  Goal: Maintain absence of muscle cramping  10/28/2020 1319 by Liss Trujillo RN  Outcome: Ongoing     Problem: Risk for Fluid Volume Deficit  Goal: Maintain normal serum potassium, sodium, calcium, phosphorus, and pH  10/28/2020 1319 by Liss Trujillo RN  Outcome: Ongoing     Problem: Loneliness or Risk for Loneliness  Goal: Demonstrate positive use of time alone when socialization is not possible  10/28/2020 1319 by Liss Trujillo RN  Outcome: Ongoing     Problem: Fatigue  Goal: Verbalize increase energy and improved vitality  10/28/2020 1319 by Liss Trujillo RN  Outcome: Ongoing     Problem: Patient Education: Go to Patient Education Activity  Goal: Patient/Family Education  10/28/2020 1319 by Liss Trujillo RN  Outcome: Ongoing     Problem: OXYGENATION/RESPIRATORY FUNCTION  Goal: Patient will maintain patent airway  10/28/2020 1319 by Liss Trujillo RN  Outcome: Ongoing     Problem: OXYGENATION/RESPIRATORY FUNCTION  Goal: Patient will achieve/maintain normal respiratory rate/effort  Description: Respiratory rate and effort will be within normal limits for the patient  10/28/2020 1319 by Liss Trujillo RN  Outcome: Ongoing     Problem: HEMODYNAMIC STATUS  Goal: Patient has stable vital signs and fluid balance  10/28/2020 1319 by Liss Trujillo RN  Outcome: Ongoing     Problem: FLUID AND ELECTROLYTE IMBALANCE  Goal: Fluid and electrolyte balance are achieved/maintained  10/28/2020 1319 by Liss Trujillo RN  Outcome: Ongoing     Problem: ACTIVITY INTOLERANCE/IMPAIRED MOBILITY  Goal: Mobility/activity is maintained at optimum level for patient  10/28/2020 1319 by Liss Trujillo RN  Outcome: Ongoing     Problem: Nutrition  Goal: Optimal nutrition therapy  10/28/2020 1319 by Liss Trujillo RN  Outcome: Ongoing     Problem: Pain:  Goal: Pain level will decrease  Description: Pain level will decrease  10/28/2020 1319 by Liss Trujillo RN  Outcome: Ongoing     Problem: Pain:  Goal: Control of acute pain  Description:

## 2020-10-28 NOTE — PLAN OF CARE
Problem: Falls - Risk of:  Goal: Will remain free from falls  Description: Will remain free from falls  10/28/2020 0135 by Laverne Foster RN  Outcome: Ongoing  10/27/2020 1436 by Kenny Calzada RN  Outcome: Ongoing  Goal: Absence of physical injury  Description: Absence of physical injury  10/28/2020 0135 by Laverne Foster RN  Outcome: Ongoing  10/27/2020 1436 by Kenny Calzada RN  Outcome: Ongoing     Problem: Airway Clearance - Ineffective  Goal: Achieve or maintain patent airway  10/28/2020 0135 by Laverne Foster RN  Outcome: Ongoing  10/27/2020 1436 by Kenny Calzada RN  Outcome: Ongoing

## 2020-10-28 NOTE — PROGRESS NOTES
Patient placed on Vapotherm per order from Dr. Mateusz Fontaine. Patient tolerating well.  Electronically signed by Mi Hoover RCP on 10/28/2020 at 4:42 AM

## 2020-10-28 NOTE — PROGRESS NOTES
ranolazine  1,000 mg Oral BID    budesonide-formoterol  2 puff Inhalation BID    albuterol-ipratropium  1 puff Inhalation 4x Daily    pregabalin  50 mg Oral Nightly       Continuous Infusions:   dextrose         PRN Meds:  nitroGLYCERIN, sodium chloride, sodium chloride flush, acetaminophen **OR** acetaminophen, ALPRAZolam, perflutren lipid microspheres, ondansetron, guaiFENesin-dextromethorphan, albuterol sulfate HFA, oxyCODONE-acetaminophen, glucose, dextrose, glucagon (rDNA), dextrose    Labs reviewed:  CBC:   Recent Labs     10/26/20  0456 10/27/20  0542 10/28/20  0622   WBC 4.3 5.4 7.1   HGB 9.2* 9.2* 9.1*   HCT 29.0* 28.4* 28.4*   MCV 86.9 85.7 85.8    177 193     BMP:   Recent Labs     10/26/20  0456 10/27/20  0542 10/28/20  0623    135* 130*   K 4.2 4.7 4.3   CL 96* 97* 91*   CO2 32 31 28   BUN 11 13 13   CREATININE 0.8 0.8 0.9     LIVER PROFILE:   Recent Labs     10/26/20  0456 10/27/20  0542 10/28/20  0623   AST 29 29 36   ALT 13 12 15   BILITOT <0.2 0.3 0.3   ALKPHOS 79 83 97     PT/INR: No results for input(s): PROTIME, INR in the last 72 hours. APTT: No results for input(s): APTT in the last 72 hours. UA:No results for input(s): NITRITE, COLORU, PHUR, LABCAST, WBCUA, RBCUA, MUCUS, TRICHOMONAS, YEAST, BACTERIA, CLARITYU, SPECGRAV, LEUKOCYTESUR, UROBILINOGEN, BILIRUBINUR, BLOODU, GLUCOSEU, AMORPHOUS in the last 72 hours. Invalid input(s): Irven Guess  No results for input(s): PH, PCO2, PO2 in the last 72 hours. Cx:      Films:  Radiology Review:  Pertinent images / reports were reviewed as a part of this visit.     CT Chest w/ contrast:   Results for orders placed during the hospital encounter of 04/01/12   CT chest with contrast    Narrative Site: [address]  [id number]  Rad #:   Unit #: [id number]  Location: [facility]  Account #: [de-identified]  Req #: [id number]  Order #: [id number]  Primary Insurance: MEDICARE PART A   B  Procedure: [id number] Chest FR PE W Cont [id number]  Exam Date/Time: 04/11/12 0001  Admitting Diagnosis: CHEST PAIN,CAD  Reason for exam: R/O EMBOLI        Dictated by:  Tobias Draper MD  Signed by:    Tobias Draper MD   04/11/12 1540      KF  D:  04/11/12 0957  T:  04/11/12 1047    This document is confidential medical information. Unauthorized disclosure or   use of this information is prohibited by law. If you are not the intended   recipient of this document, please advise us by calling immediately   [phone]. Impression/Conclusion below      HELICAL CHEST CT WITH CONTRAST   04/11/12    TECHNIQUE:  Routine. 100 mL of Optiray 350 was injected IV. FINDINGS:  Since 07/13/09, there is good opacification of the pulmonary arteries. No filling   defects are identified. Right middle lobe and right lower lobe consolidation with air   bronchograms are noted. In the lungs there is diffuse peripheral reticular markings. These   are more accentuated in the anterior right upper lobe as well as in bilateral lower lobes. An   event recorder is noted in the anterior chest wall. Since the 07/13/09 study the reticular   markings in the lungs are stable. IMPRESSION:    1. No pulmonary embolus appreciated. 2.  There are stable reticular markings in the lungs. These may reflect previous post   inflammatory scar. 3.  There is consolidation in the right middle lobe and right lower lobe. Some of this   consolidation was present in 2009 and may represent chronic scar. Superimposed pneumonia could   not be entirely excluded, however. CT Chest w/o contrast:   Results for orders placed during the hospital encounter of 01/19/19   CT CHEST WO CONTRAST    Narrative EXAMINATION:  CT OF THE CHEST WITHOUT CONTRAST 1/19/2019 11:52 am    TECHNIQUE:  CT of the chest was performed without the administration of intravenous  contrast. Multiplanar reformatted images are provided for review.  Dose  modulation, iterative reconstruction, and/or weight based adjustment of the  mA/kV was utilized to reduce the radiation dose to as low as reasonably  achievable. COMPARISON:  03/16/2010    HISTORY:  ORDERING SYSTEM PROVIDED HISTORY: CHEST WALL PAIN  TECHNOLOGIST PROVIDED HISTORY:  Ordering Physician Provided Reason for Exam: pt has pain in her right  posterior ribs s/p falling onto her right side on 01/17  Acuity: Acute  Type of Exam: Initial  Mechanism of Injury: fall onto right side  Relevant Medical/Surgical History: COPD, CAD, diabetes, cholecystectomy    FINDINGS:  Mediastinum: Visualized portions of the thyroid gland are unremarkable. There is a conventional branching pattern to the great vessels. No discrete  mediastinal or hilar lymphadenopathy identified. The heart is normal in size  without pericardial fluid collection. Lungs/pleura: Evaluation of the lung parenchyma is somewhat limited due to  motion artifact. Peripheral lower lobe opacities bilaterally favoring  atelectasis/scarring. No definite pleural effusion or pneumothorax. Linear  scarring/atelectasis within the lingula also noted. Mild generalized  interstitial prominence. No definite bronchiectasis. No discrete pulmonary  nodules or masses. Central airways are patent. Upper Abdomen: Status post cholecystectomy. Soft Tissues/Bones: Nondisplaced fractures of the right 2nd through 4th ribs  anterolaterally, limited by motion artifact and possibly subacute/chronic. No left-sided rib fractures are identified. Impression Bilateral lower lobe peripheral opacities, favoring atelectasis/scarring. Similar findings noted within the lingula. No definite pleural effusion or  pneumothorax. Age-indeterminate fractures of the right 2nd and 4th ribs anterolaterally,  limited by motion artifact.          CTPA:   Results for orders placed during the hospital encounter of 10/20/20   CT CHEST PULMONARY EMBOLISM W CONTRAST    Narrative EXAMINATION:  CTA OF THE CHEST 10/21/2020 1:19 am    TECHNIQUE:  CTA of the chest was performed after the administration of intravenous  contrast.  Multiplanar reformatted images are provided for review. MIP  images are provided for review. Dose modulation, iterative reconstruction,  and/or weight based adjustment of the mA/kV was utilized to reduce the  radiation dose to as low as reasonably achievable. COMPARISON:  CT chest 01/19/2019    HISTORY:  ORDERING SYSTEM PROVIDED HISTORY: SOB, RLE swelling  TECHNOLOGIST PROVIDED HISTORY:  Reason for exam:->SOB, RLE swelling  Reason for Exam: SOB, RLE swelling COVID positive  Acuity: Acute  Relevant Medical/Surgical History: hx hypertension, copd, pneumonia    FINDINGS:  Pulmonary Arteries: Pulmonary arteries are adequately opacified for  evaluation. No evidence of intraluminal filling defect to suggest pulmonary  embolism. Main pulmonary artery is mildly enlarged at 3.1 cm in caliber. Mediastinum: Mild mediastinal and hilar lymphadenopathy. Normal caliber  thoracic aorta without evidence of aneurysm or dissection. Upper normal  heart size. Borderline trace pericardial effusion. Multivessel coronary  calcifications. Lungs/pleura: Patchy alveolar and consolidative airspace disease. Interlobular septal thickening. Dependent atelectasis. No pneumothorax. Linear secretions within the central airways. Bronchiolectasis evident along  subpleural lungs. Upper Abdomen: Status post cholecystectomy. Soft Tissues/Bones: No enlarged axillary supraclavicular lymph nodes. Loop  recorder overlies the sternum. Healed rib fractures. Impression Negative for acute pulmonary embolism. Multifocal ground-glass and consolidative airspace disease is suspicious for  infection, including atypical and viral pathogens (including COVID-19). Mild interstitial pulmonary edema. Mild subpleural reticular abnormality and bronchiolectasis, suggesting  underlying interstitial lung disease.     Mild mediastinal and hilar lymphadenopathy, presumably reactive. CXR PA/LAT:   Results for orders placed in visit on 09/14/20   XR CHEST (2 VW)    Narrative Site: [facility] #: [id number] #: [id number]: Gladys Jordan #: [de-identified] #: [id number] #: [id number]: XR CHEST PA AND LATERALExam Date/Time: 09/14/2020 05:30 PMAdmitting Diagnosis: Reason for Exam:  Dictated by: Cait Butler LILY: 09/14/2020 05:47 PMT: This document is confidential medical information. Unauthorized disclosure or use of this information is prohibited by law. If you are not the intended recipient of this document, please advise   us by calling immediately [phone]. Impression/Conclusion below    HISTORY: Postnasal drip;Chronic obstructive pulmonary disease, unspecified; Other specified   disorders of nose and nasal sinuses  COMPARISON: 8/19/2019  NOTE:  If there are questions about the content of this report, please contact [address] [place] [address]   radiology by calling [phone]    FINDINGS:  LINES/DEVICES: Stable implanted electronic device anterior chest.  LUNGS/PLEURA:  Stable chronic interstitial changes of the lungs consistent with fibrosis. Appearance similar to 8/19/2019. No acute airspace disease. HEART:  Enlarged  MEDIASTINUM/MARIANELA:  Unremarkable  BONES:  Osteopenia  OTHER:  None        IMPRESSION:    Stable chronic interstitial changes of the lungs consistent with history of fibrosis. SIGNED BY: Aida Kohli DO on 9/14/2020  5:44 PM       [facility] - [address] [facility] [phone] -  [facility] Call Center: [phone]            CXR portable:   Results for orders placed during the hospital encounter of 10/20/20   XR CHEST PORTABLE    Narrative EXAMINATION:  ONE XRAY VIEW OF THE CHEST    10/27/2020 1:59 pm    COMPARISON:  10/20/2020.     HISTORY:  ORDERING SYSTEM PROVIDED HISTORY: hypoxemia  TECHNOLOGIST PROVIDED HISTORY:  Reason for exam:->hypoxemia  Reason for Exam: sob    FINDINGS:  The cardiac silhouette is enlarged and stable. Aortic vascular  calcification. There are new areas of ground-glass opacification in the  peripheral aspect of the right upper lobe and left mid lung. Findings are  concerning for pneumonia including atypical viral pneumonia. Dependent  changes at the lung bases, likely atelectasis. Impression New multifocal peripheral ground-glass opacification, concerning for  pneumonia including atypical viral pneumonia. Assessment:            Plan:        Hospital Day: 7     Acute hypoxemia/Covid 19    -Convalescent plasma 10/27  -Remdesivir started 10/26  - increase decadron to 20 mg daily  - discussed progression with daughter and the patient. Very likely to need transfer to ICU / intubation  - poor prognosis. Code status changed. - continue on floor for now  - no sign of secondary infection. Volume status controlled, - 4 L for admission    [phone]  Spoke with Daughter. Prior to this admission, she was a DNR CCA. With admission was changed to full code. However, daughter now has changed her to no chest compressions, intubation ok. Updated Daughter. 14 minute phone call. Asthma/COPD  -Symbicort  -Combivent  -Decadron      Interstitial lung disease  -Nonspecific. -Decadron. Stenotrophomonas pneumonia  -Treated with IV Levaquin started 10/23  - complete therapy          Nutrition  - DIET LOW SODIUM 2 GM; 1500 ml  Dietary Nutrition Supplements: Low Volume Supplement  -     Intake/Output Summary (Last 24 hours) at 10/28/2020 0951  Last data filed at 10/28/2020 0100  Gross per 24 hour   Intake 1532.5 ml   Output 1100 ml   Net 432.5 ml                 Access  Arterial      PICC          CVC              very high risk. This note was transcribed using [id number]. Please disregard any translational errors.       Tristian Ledezma [facility] [phone]

## 2020-10-29 NOTE — PLAN OF CARE
Problem: Airway Clearance - Ineffective  Goal: Achieve or maintain patent airway  10/29/2020 0004 by Milo Houser RN  Outcome: Ongoing  10/28/2020 1319 by Liss Trujillo RN  Outcome: Ongoing     Problem: Gas Exchange - Impaired  Goal: Absence of hypoxia  10/29/2020 0004 by Milo Houser RN  Outcome: Ongoing  10/28/2020 1319 by Liss Trujillo RN  Outcome: Ongoing  Goal: Promote optimal lung function  10/29/2020 0004 by Milo Houser RN  Outcome: Ongoing  10/28/2020 1319 by Liss Trujillo RN  Outcome: Ongoing

## 2020-10-29 NOTE — PROGRESS NOTES
obvious mass. Resp: No accessory muscle use. No crackles. No wheezes. + rhonchi. No dullness on percussion. CV: Regular rate. Regular rhythm. No murmur or rub. Trace edema. GI: Non-tender. Non-distended. No hernia. Skin: Warm, dry, normal texture and turgor. No nodule on exposed extremities. Lymph: No cervical LAD. No supraclavicular LAD. M/S: No cyanosis. No clubbing. No joint deformity. Neuro: Moves all four extremities. CN 2-12 tested, no defect noted. Psych: Oriented x 3. No anxiety. Awake. Alert. Intact judgement and insight. Data    LABS  CBC:   Recent Labs     10/27/20  0542 10/28/20  0622 10/29/20  0616   WBC 5.4 7.1 5.5   HGB 9.2* 9.1* 8.8*   HCT 28.4* 28.4* 27.2*   MCV 85.7 85.8 86.2    193 190     BMP:   Recent Labs     10/27/20  0542 10/28/20  0623 10/29/20  0616   * 130* 137   K 4.7 4.3 3.7   CL 97* 91* 98*   CO2 31 28 31   BUN 13 13 16   CREATININE 0.8 0.9 0.8   GLUCOSE 83 97 86     POC GLUCOSE:    Recent Labs     10/28/20  0745 10/28/20  1338 10/28/20  1653 10/28/20  2103 10/29/20  0816   POCGLU 112* 148* 277* 237* 92     LIVER PROFILE:   Recent Labs     10/27/20  0542 10/28/20  0623 10/29/20  0616   AST 29 36 29   ALT 12 15 15   LABALBU 2.9* 2.9* 2.8*   BILITOT 0.3 0.3 0.3   ALKPHOS 83 97 86     PT/INR:   No results for input(s): PROTIME, INR in the last 72 hours. APTT:   No results for input(s): APTT in the last 72 hours. UA:No results for input(s): NITRITE, COLORU, PHUR, LABCAST, WBCUA, RBCUA, MUCUS, TRICHOMONAS, YEAST, BACTERIA, CLARITYU, SPECGRAV, LEUKOCYTESUR, UROBILINOGEN, BILIRUBINUR, BLOODU, GLUCOSEU, KETUA, AMORPHOUS in the last 72 hours. Microbiology:  Wound Culture: No results for input(s): WNDABS, ORG in the last 72 hours. Invalid input(s):  LABGRAM  Nasal Culture: No results for input(s): ORG, MRSAPCR in the last 72 hours. Blood Culture:   No results for input(s): BC, BLOODCULT2 in the last 72 hours.   Fungal Culture:   No results for input(s): FUNGSM in the last 72 hours. No results for input(s): FUNCXBLD in the last 72 hours. CSF Culture:  No results for input(s): COLORCSF, APPEARCSF, CFTUBE, CLOTCSF, WBCCSF, RBCCSF, NEUTCSF, NUMCELLSCSF, LYMPHSCSF, MONOCSF, GLUCCSF, VOLCSF in the last 72 hours. Respiratory Culture:  No results for input(s): Alferd Kand in the last 72 hours. AFB:No results for input(s): AFBSMEAR in the last 72 hours. Urine Culture  No results for input(s): LABURIN in the last 72 hours. RADIOLOGY:    XR CHEST PORTABLE   Final Result   New multifocal peripheral ground-glass opacification, concerning for   pneumonia including atypical viral pneumonia. VL Extremity Venous Bilateral   Final Result      CT CHEST PULMONARY EMBOLISM W CONTRAST   Final Result   Negative for acute pulmonary embolism. Multifocal ground-glass and consolidative airspace disease is suspicious for   infection, including atypical and viral pathogens (including COVID-19). Mild interstitial pulmonary edema. Mild subpleural reticular abnormality and bronchiolectasis, suggesting   underlying interstitial lung disease. Mild mediastinal and hilar lymphadenopathy, presumably reactive. XR CHEST PORTABLE   Final Result   Features of heart failure, with moderate interstitial pulmonary edema. Basilar opacities, favor atelectasis. Superimposed pneumonia or aspiration   could be present in the appropriate context.              CONSULTS:    IP CONSULT TO DIETITIAN  IP CONSULT TO CARDIOLOGY  IP CONSULT TO INFECTIOUS DISEASES  IP CONSULT TO PULMONOLOGY  IP CONSULT TO PHARMACY  IP CONSULT TO PALLIATIVE CARE    ASSESSMENT AND PLAN:      Active Problems:    HTN (hypertension)    Hypertriglyceridemia    Coronary artery disease involving native coronary artery of native heart with angina pectoris (HCC)    Diabetes mellitus ([facility] [address]    COVID-19    Pneumonia    CHF exacerbation (HCC)    COPD exacerbation ([facility] [address]    Acute on chronic respiratory failure (HCC)    Sepsis ([facility] [address]    Edema of right lower extremity    Pneumonia due to COVID-19 virus    Chronic respiratory failure with hypoxia (HCC)    Asthma-COPD overlap syndrome ([facility] [address]    Interstitial lung disease ([facility] [address]  Resolved Problems:    * No resolved hospital problems. *    Patient is a 64-year-old female with a past medical history of COPD, chronic respiratory failure, coronary artery disease, hypertension, previous CVA and CHF who presented with worsening shortness of breath and hypoxia. Patient was found to be hypoxic in the low 80s on 4 L of oxygen on admission. There was some concern of possible CHF and patient was diuresed. She started on dexamethasone. Acute on chronic hypoxic respiratory failure- now on vapotherm  -Secondary to COVID-19  -Wean oxygen as tolerated. Uses 2 L of oxygen  -pro rayna ok   - CXR shows multifocal pneumonia  -pulm consulted    COVID-19 pneumonia  -Started on Decadron, day 9  -Infectious disease consult  - remdesivir day 4  - convalescent plasma given 10/27      Possible bacterial pneumonia  -Procalcitonin elevated on admission, now improved . Treat as gram-negative/atypical day  -Continue antibiotics     COPR  -ct inhalers    Coronary artery disease with chest pain  -Continue home medications  -Troponin negative EKG shows no acute ST-T wave changes  -Patient states she is not a candidate for any kind of procedure. Will add nitro as needed    Possible CHF-ruled out - echo shows nrl EF   -Patient had some pitting edema on admission with interstitial edema on CT  -Patient's baseline weight documented as about 193 pounds. Patient is below her dry weight at present    DM2  -Diet controlled. Last A1c 6.5  -Sliding scale insulin    HTN  - ct home meds    Anxiety  -Resume home meds    Chronic pain  -Continue home medication    Code changed to limited code per patient wishes. Patient does not want BiPAP or intubation or resuscitation.   If she gets hypoxic on the Vapotherm she would like comfort meds    DVT Prophylaxis: lovenox  Diet: DIET LOW SODIUM 2 GM; 1500 ml  Dietary Nutrition Supplements: Low Volume Supplement  Code Status: Limited        Discharge plan -unclear, possibly needs hospice if she deteriorates on vapotherm      The patient and / or the family were informed of the results of any tests, a time was given to answer questions, a plan was proposed and they agreed with plan. Discussed with consulting physicians, nursing and social work     The note was completed using EMR. Every effort was made to ensure accuracy; however, inadvertent computerized transcription errors may be present.        Tenzin Benoit MD

## 2020-10-29 NOTE — PROGRESS NOTES
Pulmonary Progress Note    Date of Admission: 10/20/2020   LOS: 8 days       CC:  COVID-19 hypoxemia    Subjective:  Feeling much better today. Sitting in chair. ROS:   No nausea  No Vomiting  No chest pain        PHYSICAL EXAM:   Blood pressure 118/68, pulse 83, temperature 97 °F (36.1 °C), temperature source Axillary, resp. rate 18, height 5' 6\" (1.676 m), weight 189 lb 9.5 oz (86 kg), SpO2 (!) 89 %, not currently breastfeeding.'  Gen: No distress. Eyes: PERRL. No sclera icterus. No conjunctival injection. ENT: No discharge. Pharynx clear. External appearance of ears and nose normal.  Neck: Trachea midline. No obvious mass. Resp: No accessory muscle use. No  crackles. no wheezes. no rhonchi. Worsen then yesterday    CV: Regular rate. Regular rhythm. No murmur or rub. No edema. GI: Non-tender. Non-distended. No hernia. Skin: Warm, dry, normal texture and turgor. No nodule on exposed extremities. Lymph: No cervical LAD. No supraclavicular LAD. M/S: No cyanosis. No clubbing. No joint deformity. Neuro: Moves all four extremities. Psych: Oriented x 3. No anxiety. Awake. Alert. Intact judgement and insight.       Medications:    Scheduled Meds:   dexamethasone  20 mg Oral Daily    acetaminophen  650 mg Oral 3 times per day    remdesivir IVPB  100 mg Intravenous Q24H    carBAMazepine  300 mg Oral BID    polyethylene glycol  17 g Oral Daily    levoFLOXacin  750 mg Oral Daily    psyllium  1 packet Oral TID     sodium chloride flush  10 mL Intravenous 2 times per day    enoxaparin  30 mg Subcutaneous BID    aspirin  81 mg Oral Daily    atorvastatin  80 mg Oral Daily    levothyroxine  100 mcg Oral Daily    metoprolol tartrate  25 mg Oral BID    pantoprazole  20 mg Oral Daily    lisinopril  5 mg Oral Daily    amLODIPine  2.5 mg Oral Daily    isosorbide mononitrate  60 mg Oral Daily    montelukast  10 mg Oral Nightly    insulin lispro  0-12 Units Subcutaneous TID     insulin lispro  0-6 Units Subcutaneous Nightly    ranolazine  1,000 mg Oral BID    budesonide-formoterol  2 puff Inhalation BID    albuterol-ipratropium  1 puff Inhalation 4x Daily    pregabalin  50 mg Oral Nightly       Continuous Infusions:   dextrose         PRN Meds:  nitroGLYCERIN, sodium chloride, sodium chloride flush, acetaminophen **OR** acetaminophen, ALPRAZolam, perflutren lipid microspheres, ondansetron, guaiFENesin-dextromethorphan, albuterol sulfate HFA, oxyCODONE-acetaminophen, glucose, dextrose, glucagon (rDNA), dextrose    Labs reviewed:  CBC:   Recent Labs     10/27/20  0542 10/28/20  0622 10/29/20  0616   WBC 5.4 7.1 5.5   HGB 9.2* 9.1* 8.8*   HCT 28.4* 28.4* 27.2*   MCV 85.7 85.8 86.2    193 190     BMP:   Recent Labs     10/27/20  0542 10/28/20  0623 10/29/20  0616   * 130* 137   K 4.7 4.3 3.7   CL 97* 91* 98*   CO2 31 28 31   BUN 13 13 16   CREATININE 0.8 0.9 0.8     LIVER PROFILE:   Recent Labs     10/27/20  0542 10/28/20  0623 10/29/20  0616   AST 29 36 29   ALT 12 15 15   BILITOT 0.3 0.3 0.3   ALKPHOS 83 97 86     PT/INR: No results for input(s): PROTIME, INR in the last 72 hours. APTT: No results for input(s): APTT in the last 72 hours. UA:No results for input(s): NITRITE, COLORU, PHUR, LABCAST, WBCUA, RBCUA, MUCUS, TRICHOMONAS, YEAST, BACTERIA, CLARITYU, SPECGRAV, LEUKOCYTESUR, UROBILINOGEN, BILIRUBINUR, BLOODU, GLUCOSEU, AMORPHOUS in the last 72 hours. Invalid input(s): Kristen Felix  No results for input(s): PH, PCO2, PO2 in the last 72 hours. Cx:      Films:  Radiology Review:  Pertinent images / reports were reviewed as a part of this visit.     CT Chest w/ contrast:   Results for orders placed during the hospital encounter of 04/01/12   CT chest with contrast    Narrative Site: [address]  [id number]  Rad #:   Unit #: [id number]  Location: [facility]  Account #: [de-identified]  Req #: [id number]  Order #: [id number]  Primary Insurance: MEDICARE PART A Narrative EXAMINATION:  CTA OF THE CHEST 10/21/2020 1:19 am    TECHNIQUE:  CTA of the chest was performed after the administration of intravenous  contrast.  Multiplanar reformatted images are provided for review. MIP  images are provided for review. Dose modulation, iterative reconstruction,  and/or weight based adjustment of the mA/kV was utilized to reduce the  radiation dose to as low as reasonably achievable. COMPARISON:  CT chest 01/19/2019    HISTORY:  ORDERING SYSTEM PROVIDED HISTORY: SOB, RLE swelling  TECHNOLOGIST PROVIDED HISTORY:  Reason for exam:->SOB, RLE swelling  Reason for Exam: SOB, RLE swelling COVID positive  Acuity: Acute  Relevant Medical/Surgical History: hx hypertension, copd, pneumonia    FINDINGS:  Pulmonary Arteries: Pulmonary arteries are adequately opacified for  evaluation. No evidence of intraluminal filling defect to suggest pulmonary  embolism. Main pulmonary artery is mildly enlarged at 3.1 cm in caliber. Mediastinum: Mild mediastinal and hilar lymphadenopathy. Normal caliber  thoracic aorta without evidence of aneurysm or dissection. Upper normal  heart size. Borderline trace pericardial effusion. Multivessel coronary  calcifications. Lungs/pleura: Patchy alveolar and consolidative airspace disease. Interlobular septal thickening. Dependent atelectasis. No pneumothorax. Linear secretions within the central airways. Bronchiolectasis evident along  subpleural lungs. Upper Abdomen: Status post cholecystectomy. Soft Tissues/Bones: No enlarged axillary supraclavicular lymph nodes. Loop  recorder overlies the sternum. Healed rib fractures. Impression Negative for acute pulmonary embolism. Multifocal ground-glass and consolidative airspace disease is suspicious for  infection, including atypical and viral pathogens (including COVID-19). Mild interstitial pulmonary edema.     Mild subpleural reticular abnormality and bronchiolectasis,

## 2020-10-30 NOTE — PLAN OF CARE
proper nutrition. Patients intake monitored and patient assessed to make sure no assistance with eating was required. Patient encouraged to eat a well balanced diet. Problem: Isolation Precautions - Risk of Spread of Infection  Goal: Prevent transmission of infection  Outcome: Ongoing  Note: Proper PPE and good handwashing used. Problem: Risk for Fluid Volume Deficit  Goal: Maintain normal heart rhythm  Outcome: Ongoing  Note: Patient has stable vital signs and fluid balance at this time. Will continue to monitor patient throughout shift and provide care for unstable vital signs or poor fluid balance as needed. Goal: Maintain absence of muscle cramping  Outcome: Met This Shift  Note: No c/o of cramping per this shift. Goal: Maintain normal serum potassium, sodium, calcium, phosphorus, and pH  Outcome: Ongoing     Problem: Loneliness or Risk for Loneliness  Goal: Demonstrate positive use of time alone when socialization is not possible  Outcome: Ongoing  Note: Pt speaking to family on the phone and watching TV     Problem: Loneliness or Risk for Loneliness  Goal: Demonstrate positive use of time alone when socialization is not possible  Outcome: Ongoing  Note: Pt speaking to family on the phone and watching TV     Problem: Fatigue  Goal: Verbalize increase energy and improved vitality  Outcome: Ongoing     Problem: Patient Education: Go to Patient Education Activity  Goal: Patient/Family Education  Outcome: Ongoing     Problem: HEMODYNAMIC STATUS  Goal: Patient has stable vital signs and fluid balance  Outcome: Ongoing  Note: Maintain hydration by drinking small amounts of clear fluids frequently. Tolerated well. Output good. Problem: FLUID AND ELECTROLYTE IMBALANCE  Goal: Fluid and electrolyte balance are achieved/maintained  Outcome: Ongoing  Note: Patient monitored for fluid and electrolyte imbalance throughout the day by use of daily labs.  Patient encouraged to drink fluids and eat a well balanced . Abnormal lab results will be reported to physician, per orders. Will continue to monitor. Problem: ACTIVITY INTOLERANCE/IMPAIRED MOBILITY  Goal: Mobility/activity is maintained at optimum level for patient  Outcome: Ongoing  Note: Early and frequent ambulqtion encouraged. Educated patient on importance of early ambulation. Patient assisted with ambulation. Will continue to monitor. Problem: Nutrition  Goal: Optimal nutrition therapy  Outcome: Ongoing     Problem: Pain:  Goal: Pain level will decrease  Description: Pain level will decrease  Outcome: Ongoing  Note: Pt c/o of generalized aching per this shift. Pain /discomfort being managed with PRN analgesics per MD orders. Patient able to express presence and absence of pain and rate pain appropriately using numerical scale. Goal: Control of acute pain  Description: Control of acute pain  Outcome: Ongoing  Note: Pt c/o of generalized aching per this shift. Pain /discomfort being managed with PRN analgesics per MD orders. Patient able to express presence and absence of pain and rate pain appropriately using numerical scale. Goal: Control of chronic pain  Description: Control of chronic pain  Outcome: Ongoing  Note: Pt c/o of generalized aching per this shift. Pain /discomfort being managed with PRN analgesics per MD orders. Patient able to express presence and absence of pain and rate pain appropriately using numerical scale. Problem: Discharge Planning:  Goal: Discharged to appropriate level of care  Description: Discharged to appropriate level of care  Outcome: Not Met This Shift     Problem: Skin Integrity:  Goal: Will show no infection signs and symptoms  Description: Will show no infection signs and symptoms  Outcome: Ongoing  Note: Michael score assessed. Patient able to ambulate with assistance and turn self. Repositioned patient Q2H and assessed skin.  Educated patient on importance of repositioning to prevent skin issues. Goal: Absence of new skin breakdown  Description: Absence of new skin breakdown  Outcome: Ongoing  Note: Pt skin assessed per this shift. No knew area noted. Patient able to ambulate with assistance and turn self. Repositioned patient Q2H and assessed skin. Educated patient on importance of repositioning to prevent skin issues.

## 2020-10-30 NOTE — PROGRESS NOTES
Call placed to patients daughter to notify of covid visiting protocol  And protocol if they chose to withdraw care.      Patients daughter requesting to be called if patient is deteriorating in status

## 2020-10-30 NOTE — PLAN OF CARE
Problem: Falls - Risk of:  Goal: Will remain free from falls  Description: Will remain free from falls  Outcome: Met This Shift  Goal: Absence of physical injury  Description: Absence of physical injury  Outcome: Met This Shift     Problem: Airway Clearance - Ineffective  Goal: Achieve or maintain patent airway  Outcome: Ongoing     Problem: Gas Exchange - Impaired  Goal: Absence of hypoxia  Outcome: Ongoing  Goal: Promote optimal lung function  Outcome: Ongoing     Problem: Breathing Pattern - Ineffective  Goal: Ability to achieve and maintain a regular respiratory rate  Outcome: Ongoing     Problem:  Body Temperature -  Risk of, Imbalanced  Goal: Ability to maintain a body temperature within defined limits  Outcome: Ongoing  Goal: Will regain or maintain usual level of consciousness  Outcome: Ongoing  Goal: Complications related to the disease process, condition or treatment will be avoided or minimized  Outcome: Ongoing     Problem: Isolation Precautions - Risk of Spread of Infection  Goal: Prevent transmission of infection  Outcome: Met This Shift     Problem: Nutrition Deficits  Goal: Optimize nutrtional status  Outcome: Ongoing     Problem: Nutrition  Goal: Optimal nutrition therapy  10/30/2020 1358 by Kenyatta Hamilton RD, LD  Outcome: Ongoing

## 2020-10-30 NOTE — PROGRESS NOTES
Pt AAO x4 per this shift. Pt c/o of generalized aching per this shift. Managed with Prn medication per MD orders, rest, repositioning. Pt remains on 40 L of vapotherm d/t de-stating when speaking and re postioning. Pt tolerating PO fluids. Pt daughter updated per this shift. No further needs voiced at this time. Fall precautions in place. Bed alarm on. Call light within reach. Will continue to round. Electronically signed by Kike Caputo RN on 10/30/2020 at 4:57 AM

## 2020-10-30 NOTE — CARE COORDINATION
Per chart review patient is on 40 L of oxygen, will continue to follow as she progresses for D/C needs.     Electronically signed by Frankey Flies, RN on 10/30/2020 at 5:12 PM

## 2020-10-30 NOTE — PROGRESS NOTES
Comprehensive Nutrition Assessment    Type and Reason for Visit:  Reassess    Nutrition Recommendations/Plan:   Continue chocolate Ensure Compact bid    Nutrition Assessment:  Follow-up. Pt continues to tolerate the ordered 2 gm Na / 1500 ml per day with chocolate Ensure Compact bid. Intake good at this time. Noted worsening O2 sats. Malnutrition Assessment:  Malnutrition Status: At risk for malnutrition (Comment)    Context:  Chronic Illness     Findings of the 6 clinical characteristics of malnutrition:  Energy Intake:  Mild decrease in energy intake (Comment)  Weight Loss:  1 - Mild weight loss (specify amount and time period)(- ~6% weight loss)     Body Fat Loss:  Unable to assess     Muscle Mass Loss:  Unable to assess    Fluid Accumulation:  Unable to assess     Strength:  Not Performed   Due to current CDC guidelines recommending 6-ft distancing for social isolation for COVID19 prevention, NFPE/malnutrition assessment was deferred at this time. Estimated Daily Nutrient Needs:  Energy (kcal):  7859-8401 (15-18 x ABW 85 kg); Weight Used for Energy Requirements:  Current     Protein (g):   (.8-1.2 x ABW (adj for renal insufficiency); Weight Used for Protein Requirements:  Ideal        Fluid (ml/day):  1500; Method Used for Fluid Requirements:  Current      Nutrition Related Findings:  BM 10/27. Noted glycolax on board.  Noted trace generalized edema      Wounds:  (Noted scabbed area & stage l to sacrum, skin tear to left FA)       Current Nutrition Therapies:    DIET LOW SODIUM 2 GM; 1500 ml  Dietary Nutrition Supplements: Low Volume Supplement    Anthropometric Measures:  · Height: 5' 6\" (167.6 cm)  · Current Body Weight: 187 lb (84.8 kg)   · Admission Body Weight: 198 lb (89.8 kg)    · Usual Body Weight: (dry wt noted at 187 lbs)     · Ideal Body Weight: 130 lbs; % Ideal Body Weight 143.8 %   · BMI: 30.2  · Adjusted Body Weight:  ; No Adjustment   · BMI Categories: Obese Class 1 (BMI 30.0-34. 9)       Nutrition Diagnosis:   · Increased nutrient needs related to increase demand for energy/nutrients(poor appetite) as evidenced by other (comment), wounds(COVID)      Nutrition Interventions:   Food and/or Nutrient Delivery:  Continue Current Diet, Continue Oral Nutrition Supplement  Nutrition Education/Counseling:  No recommendation at this time   Coordination of Nutrition Care:  Continue to monitor while inpatient    Goals:  consume >50% of meals and supplements this admission. Nutrition Monitoring and Evaluation:   Behavioral-Environmental Outcomes:  (n/a)   Food/Nutrient Intake Outcomes:  Food and Nutrient Intake, Supplement Intake  Physical Signs/Symptoms Outcomes:  Biochemical Data, Constipation, Diarrhea, Weight, Skin, Nutrition Focused Physical Findings, Fluid Status or Edema     Discharge Planning:     Too soon to determine     Electronically signed by Franklin Serrano RD, LD on 10/30/20 at 1:57 PM EDT    Contact: [phone]

## 2020-10-30 NOTE — PROGRESS NOTES
Pulmonary Progress Note    Date of Admission: 10/20/2020   LOS: 9 days       CC:  COVID-19 hypoxemia    Subjective:  Shortness of breath today. ROS:   No nausea  No Vomiting  No chest pain        PHYSICAL EXAM:   Blood pressure 124/62, pulse 77, temperature 98.2 °F (36.8 °C), temperature source Oral, resp. rate 20, height 5' 6\" (1.676 m), weight 187 lb 2.7 oz (84.9 kg), SpO2 92 %, not currently breastfeeding.'  Gen: mild distress. Eyes: PERRL. No sclera icterus. No conjunctival injection. ENT: No discharge. Pharynx clear. External appearance of ears and nose normal.  Neck: Trachea midline. No obvious mass. Resp: No accessory muscle use. No  crackles. no wheezes. no rhonchi. Mildly worse then yesterday   CV: Regular rate. Regular rhythm. No murmur or rub. No edema. GI: Non-tender. Non-distended. No hernia. Skin: Warm, dry, normal texture and turgor. No nodule on exposed extremities. Lymph: No cervical LAD. No supraclavicular LAD. M/S: No cyanosis. No clubbing. No joint deformity. Neuro: Moves all four extremities. Psych: Oriented x 3. No anxiety. Awake. Alert. Intact judgement and insight.       Medications:    Scheduled Meds:   dexamethasone  20 mg Oral Daily    acetaminophen  650 mg Oral 3 times per day    carBAMazepine  300 mg Oral BID    polyethylene glycol  17 g Oral Daily    levoFLOXacin  750 mg Oral Daily    psyllium  1 packet Oral TID     sodium chloride flush  10 mL Intravenous 2 times per day    enoxaparin  30 mg Subcutaneous BID    aspirin  81 mg Oral Daily    atorvastatin  80 mg Oral Daily    levothyroxine  100 mcg Oral Daily    metoprolol tartrate  25 mg Oral BID    pantoprazole  20 mg Oral Daily    lisinopril  5 mg Oral Daily    amLODIPine  2.5 mg Oral Daily    isosorbide mononitrate  60 mg Oral Daily    montelukast  10 mg Oral Nightly    insulin lispro  0-12 Units Subcutaneous TID     insulin lispro  0-6 Units Subcutaneous Nightly    ranolazine 1,000 mg Oral BID    budesonide-formoterol  2 puff Inhalation BID    albuterol-ipratropium  1 puff Inhalation 4x Daily    pregabalin  50 mg Oral Nightly       Continuous Infusions:   dextrose         PRN Meds:  nitroGLYCERIN, sodium chloride, sodium chloride flush, acetaminophen **OR** acetaminophen, ALPRAZolam, perflutren lipid microspheres, ondansetron, guaiFENesin-dextromethorphan, albuterol sulfate HFA, oxyCODONE-acetaminophen, glucose, dextrose, glucagon (rDNA), dextrose    Labs reviewed:  CBC:   Recent Labs     10/28/20  0622 10/29/20  0616 10/30/20  0543   WBC 7.1 5.5 8.2   HGB 9.1* 8.8* 9.4*   HCT 28.4* 27.2* 29.6*   MCV 85.8 86.2 86.1    190 214     BMP:   Recent Labs     10/28/20  0623 10/29/20  0616 10/30/20  0543   * 137 135*   K 4.3 3.7 4.5   CL 91* 98* 96*   CO2 28 31 29   BUN 13 16 19   CREATININE 0.9 0.8 0.8     LIVER PROFILE:   Recent Labs     10/28/20  0623 10/29/20  0616 10/30/20  0543   AST 36 29 31   ALT 15 15 16   BILITOT 0.3 0.3 0.3   ALKPHOS 97 86 93     PT/INR: No results for input(s): PROTIME, INR in the last 72 hours. APTT: No results for input(s): APTT in the last 72 hours. UA:No results for input(s): NITRITE, COLORU, PHUR, LABCAST, WBCUA, RBCUA, MUCUS, TRICHOMONAS, YEAST, BACTERIA, CLARITYU, SPECGRAV, LEUKOCYTESUR, UROBILINOGEN, BILIRUBINUR, BLOODU, GLUCOSEU, AMORPHOUS in the last 72 hours. Invalid input(s): Shin Plump  No results for input(s): PH, PCO2, PO2 in the last 72 hours. Cx:      Films:  Radiology Review:  Pertinent images / reports were reviewed as a part of this visit.     CT Chest w/ contrast:   Results for orders placed during the hospital encounter of 04/01/12   CT chest with contrast    Narrative Site: [address]  [id number]  Rad #:   Unit #: [id number]  Location: [facility]  Account #: [de-identified]  Req #: [id number]  Order #: [id number]  Primary Insurance: MEDICARE PART A   B  Procedure: [id number] Chest FR PE W Cont [id number]  Exam Date/Time: 04/11/12 0001  Admitting Diagnosis: CHEST PAIN,CAD  Reason for exam: R/O EMBOLI        Dictated by:  Julio Cesar Jones MD  Signed by:    Julio Cesar Jones MD   04/11/12 1540      KF  D:  04/11/12 0957  T:  04/11/12 1047    This document is confidential medical information. Unauthorized disclosure or   use of this information is prohibited by law. If you are not the intended   recipient of this document, please advise us by calling immediately   [phone]. Impression/Conclusion below      HELICAL CHEST CT WITH CONTRAST   04/11/12    TECHNIQUE:  Routine. 100 mL of Optiray 350 was injected IV. FINDINGS:  Since 07/13/09, there is good opacification of the pulmonary arteries. No filling   defects are identified. Right middle lobe and right lower lobe consolidation with air   bronchograms are noted. In the lungs there is diffuse peripheral reticular markings. These   are more accentuated in the anterior right upper lobe as well as in bilateral lower lobes. An   event recorder is noted in the anterior chest wall. Since the 07/13/09 study the reticular   markings in the lungs are stable. IMPRESSION:    1. No pulmonary embolus appreciated. 2.  There are stable reticular markings in the lungs. These may reflect previous post   inflammatory scar. 3.  There is consolidation in the right middle lobe and right lower lobe. Some of this   consolidation was present in 2009 and may represent chronic scar. Superimposed pneumonia could   not be entirely excluded, however. CT Chest w/o contrast:   Results for orders placed during the hospital encounter of 01/19/19   CT CHEST WO CONTRAST    Narrative EXAMINATION:  CT OF THE CHEST WITHOUT CONTRAST 1/19/2019 11:52 am    TECHNIQUE:  CT of the chest was performed without the administration of intravenous  contrast. Multiplanar reformatted images are provided for review.  Dose  modulation, iterative reconstruction, and/or weight based adjustment of the  mA/kV was utilized to reduce the radiation dose to as low as reasonably  achievable. COMPARISON:  03/16/2010    HISTORY:  ORDERING SYSTEM PROVIDED HISTORY: CHEST WALL PAIN  TECHNOLOGIST PROVIDED HISTORY:  Ordering Physician Provided Reason for Exam: pt has pain in her right  posterior ribs s/p falling onto her right side on 01/17  Acuity: Acute  Type of Exam: Initial  Mechanism of Injury: fall onto right side  Relevant Medical/Surgical History: COPD, CAD, diabetes, cholecystectomy    FINDINGS:  Mediastinum: Visualized portions of the thyroid gland are unremarkable. There is a conventional branching pattern to the great vessels. No discrete  mediastinal or hilar lymphadenopathy identified. The heart is normal in size  without pericardial fluid collection. Lungs/pleura: Evaluation of the lung parenchyma is somewhat limited due to  motion artifact. Peripheral lower lobe opacities bilaterally favoring  atelectasis/scarring. No definite pleural effusion or pneumothorax. Linear  scarring/atelectasis within the lingula also noted. Mild generalized  interstitial prominence. No definite bronchiectasis. No discrete pulmonary  nodules or masses. Central airways are patent. Upper Abdomen: Status post cholecystectomy. Soft Tissues/Bones: Nondisplaced fractures of the right 2nd through 4th ribs  anterolaterally, limited by motion artifact and possibly subacute/chronic. No left-sided rib fractures are identified. Impression Bilateral lower lobe peripheral opacities, favoring atelectasis/scarring. Similar findings noted within the lingula. No definite pleural effusion or  pneumothorax. Age-indeterminate fractures of the right 2nd and 4th ribs anterolaterally,  limited by motion artifact.          CTPA:   Results for orders placed during the hospital encounter of 10/20/20   CT CHEST PULMONARY EMBOLISM W CONTRAST    Narrative EXAMINATION:  CTA OF THE CHEST 10/21/2020 1:19 am    TECHNIQUE:  CTA of the chest was performed after the administration of intravenous  contrast.  Multiplanar reformatted images are provided for review. MIP  images are provided for review. Dose modulation, iterative reconstruction,  and/or weight based adjustment of the mA/kV was utilized to reduce the  radiation dose to as low as reasonably achievable. COMPARISON:  CT chest 01/19/2019    HISTORY:  ORDERING SYSTEM PROVIDED HISTORY: SOB, RLE swelling  TECHNOLOGIST PROVIDED HISTORY:  Reason for exam:->SOB, RLE swelling  Reason for Exam: SOB, RLE swelling COVID positive  Acuity: Acute  Relevant Medical/Surgical History: hx hypertension, copd, pneumonia    FINDINGS:  Pulmonary Arteries: Pulmonary arteries are adequately opacified for  evaluation. No evidence of intraluminal filling defect to suggest pulmonary  embolism. Main pulmonary artery is mildly enlarged at 3.1 cm in caliber. Mediastinum: Mild mediastinal and hilar lymphadenopathy. Normal caliber  thoracic aorta without evidence of aneurysm or dissection. Upper normal  heart size. Borderline trace pericardial effusion. Multivessel coronary  calcifications. Lungs/pleura: Patchy alveolar and consolidative airspace disease. Interlobular septal thickening. Dependent atelectasis. No pneumothorax. Linear secretions within the central airways. Bronchiolectasis evident along  subpleural lungs. Upper Abdomen: Status post cholecystectomy. Soft Tissues/Bones: No enlarged axillary supraclavicular lymph nodes. Loop  recorder overlies the sternum. Healed rib fractures. Impression Negative for acute pulmonary embolism. Multifocal ground-glass and consolidative airspace disease is suspicious for  infection, including atypical and viral pathogens (including COVID-19). Mild interstitial pulmonary edema. Mild subpleural reticular abnormality and bronchiolectasis, suggesting  underlying interstitial lung disease.     Mild mediastinal and hilar lymphadenopathy, presumably reactive. CXR PA/LAT:   Results for orders placed in visit on 09/14/20   XR CHEST (2 VW)    Narrative Site: [facility] #: [id number] #: [id number]: Fermin Regan #: [de-identified] #: [id number] #: [id number]: XR CHEST PA AND LATERALExam Date/Time: 09/14/2020 05:30 PMAdmitting Diagnosis: Reason for Exam:  Dictated by: Jono Baker LILY: 09/14/2020 05:47 PMT: This document is confidential medical information. Unauthorized disclosure or use of this information is prohibited by law. If you are not the intended recipient of this document, please advise   us by calling immediately [phone]. Impression/Conclusion below    HISTORY: Postnasal drip;Chronic obstructive pulmonary disease, unspecified; Other specified   disorders of nose and nasal sinuses  COMPARISON: 8/19/2019  NOTE:  If there are questions about the content of this report, please contact [address] [place] [address]   radiology by calling [phone]    FINDINGS:  LINES/DEVICES: Stable implanted electronic device anterior chest.  LUNGS/PLEURA:  Stable chronic interstitial changes of the lungs consistent with fibrosis. Appearance similar to 8/19/2019. No acute airspace disease. HEART:  Enlarged  MEDIASTINUM/MARIANELA:  Unremarkable  BONES:  Osteopenia  OTHER:  None        IMPRESSION:    Stable chronic interstitial changes of the lungs consistent with history of fibrosis. SIGNED BY: Aida Riley DO on 9/14/2020  5:44 PM       [facility] - [address] [facility] [phone] -  [facility] Call Center: [phone]            CXR portable:   Results for orders placed during the hospital encounter of 10/20/20   XR CHEST PORTABLE    Narrative EXAMINATION:  ONE XRAY VIEW OF THE CHEST    10/27/2020 1:59 pm    COMPARISON:  10/20/2020.     HISTORY:  ORDERING SYSTEM PROVIDED HISTORY: hypoxemia  TECHNOLOGIST PROVIDED HISTORY:  Reason for exam:->hypoxemia  Reason for Exam: sob    FINDINGS:  The cardiac silhouette is enlarged and stable. Aortic vascular  calcification. There are new areas of ground-glass opacification in the  peripheral aspect of the right upper lobe and left mid lung. Findings are  concerning for pneumonia including atypical viral pneumonia. Dependent  changes at the lung bases, likely atelectasis. Impression New multifocal peripheral ground-glass opacification, concerning for  pneumonia including atypical viral pneumonia. Assessment:            Plan:        Hospital Day: 9     Acute hypoxemia/Covid 19    -Convalescent plasma 10/27  -Remdesivir started 10/26  -  decadron to 20 mg daily  -  On 40 L o2 + NRB mask. -  Volume status controlled @ -3.7 negative. Asthma/COPD  -Symbicort  -Combivent  -Decadron      Interstitial lung disease  -Nonspecific. -Decadron. Stenotrophomonas pneumonia  - complete therapy      called daughter and answered all questions. Nutrition  - DIET LOW SODIUM 2 GM; 1500 ml  Dietary Nutrition Supplements: Low Volume Supplement  -     Intake/Output Summary (Last 24 hours) at 10/30/2020 1622  Last data filed at 10/30/2020 0845  Gross per 24 hour   Intake 710 ml   Output 425 ml   Net 285 ml               very high risk. This note was transcribed using [id number]. Please disregard any translational errors.       Tristian Ledezma [facility] [phone]

## 2020-10-30 NOTE — PROGRESS NOTES
@4643 patient rates pain 10 out of 10 NRS scale and states \" I just want to get comfortable so I can get some rest\" RN administered 2mg of morphine IV. RN will reassess pain. @0620 RN spoke with Earnest Fernandez, who expressed concerns regarding patient condition and visitation states \" When I spoke when Dr. Natividad Carolina she said I'd be able to be with my mom, when will I be able to come up because I'm ready\" RN informed daughter that she will make charge nurse aware to verify information.      @0489 RN re-aassessed pain pt rates 7 out of 10 NRS and states \"my pain went down but I get any sleep\"

## 2020-10-30 NOTE — PROGRESS NOTES
ENT: No discharge. Pharynx clear. External appearance of ears and nose normal.  Neck: Trachea midline. No obvious mass. Resp: No accessory muscle use. No crackles. No wheezes. + rhonchi. No dullness on percussion. CV: Regular rate. Regular rhythm. No murmur or rub. Trace edema. GI: Non-tender. Non-distended. No hernia. Skin: Warm, dry, normal texture and turgor. No nodule on exposed extremities. Lymph: No cervical LAD. No supraclavicular LAD. M/S: No cyanosis. No clubbing. No joint deformity. Neuro: Moves all four extremities. CN 2-12 tested, no defect noted. Psych: Oriented x 3. No anxiety. Awake. Alert. Intact judgement and insight. Data    LABS  CBC:   Recent Labs     10/28/20  0622 10/29/20  0616 10/30/20  0543   WBC 7.1 5.5 8.2   HGB 9.1* 8.8* 9.4*   HCT 28.4* 27.2* 29.6*   MCV 85.8 86.2 86.1    190 214     BMP:   Recent Labs     10/28/20  0623 10/29/20  0616 10/30/20  0543   * 137 135*   K 4.3 3.7 4.5   CL 91* 98* 96*   CO2 28 31 29   BUN 13 16 19   CREATININE 0.9 0.8 0.8   GLUCOSE 97 86 69*     POC GLUCOSE:    Recent Labs     10/28/20  2103 10/29/20  0816 10/29/20  1205 10/29/20  1712 10/29/20  2039   POCGLU 237* 92 134* 176* 162*     LIVER PROFILE:   Recent Labs     10/28/20  0623 10/29/20  0616 10/30/20  0543   AST 36 29 31   ALT 15 15 16   LABALBU 2.9* 2.8* 2.9*   BILITOT 0.3 0.3 0.3   ALKPHOS 97 86 93     PT/INR:   No results for input(s): PROTIME, INR in the last 72 hours. APTT:   No results for input(s): APTT in the last 72 hours. UA:No results for input(s): NITRITE, COLORU, PHUR, LABCAST, WBCUA, RBCUA, MUCUS, TRICHOMONAS, YEAST, BACTERIA, CLARITYU, SPECGRAV, LEUKOCYTESUR, UROBILINOGEN, BILIRUBINUR, BLOODU, GLUCOSEU, KETUA, AMORPHOUS in the last 72 hours. Microbiology:  Wound Culture: No results for input(s): WNDABS, ORG in the last 72 hours. Invalid input(s):  LABGRAM  Nasal Culture: No results for input(s): ORG, MRSAPCR in the last 72 hours.   Blood Culture: No results for input(s): BC, Martha Patient in the last 72 hours. Fungal Culture:   No results for input(s): FUNGSM in the last 72 hours. No results for input(s): FUNCXBLD in the last 72 hours. CSF Culture:  No results for input(s): COLORCSF, APPEARCSF, CFTUBE, CLOTCSF, WBCCSF, RBCCSF, NEUTCSF, NUMCELLSCSF, LYMPHSCSF, MONOCSF, GLUCCSF, VOLCSF in the last 72 hours. Respiratory Culture:  No results for input(s): Fernando Litter in the last 72 hours. AFB:No results for input(s): AFBSMEAR in the last 72 hours. Urine Culture  No results for input(s): LABURIN in the last 72 hours. RADIOLOGY:    XR CHEST PORTABLE   Final Result   New multifocal peripheral ground-glass opacification, concerning for   pneumonia including atypical viral pneumonia. VL Extremity Venous Bilateral   Final Result      CT CHEST PULMONARY EMBOLISM W CONTRAST   Final Result   Negative for acute pulmonary embolism. Multifocal ground-glass and consolidative airspace disease is suspicious for   infection, including atypical and viral pathogens (including COVID-19). Mild interstitial pulmonary edema. Mild subpleural reticular abnormality and bronchiolectasis, suggesting   underlying interstitial lung disease. Mild mediastinal and hilar lymphadenopathy, presumably reactive. XR CHEST PORTABLE   Final Result   Features of heart failure, with moderate interstitial pulmonary edema. Basilar opacities, favor atelectasis. Superimposed pneumonia or aspiration   could be present in the appropriate context.              CONSULTS:    IP CONSULT TO DIETITIAN  IP CONSULT TO CARDIOLOGY  IP CONSULT TO INFECTIOUS DISEASES  IP CONSULT TO PULMONOLOGY  IP CONSULT TO PHARMACY  IP CONSULT TO PALLIATIVE CARE    ASSESSMENT AND PLAN:      Active Problems:    HTN (hypertension)    Hypertriglyceridemia    Coronary artery disease involving native coronary artery of native heart with angina pectoris ([facility]    Diabetes mellitus ([facility] COVID-19    Pneumonia    CHF exacerbation (HCC)    COPD exacerbation (HCC)    Acute on chronic respiratory failure (HCC)    Sepsis (HCC)    Edema of right lower extremity    Pneumonia due to COVID-19 virus    Chronic respiratory failure with hypoxia (HCC)    Asthma-COPD overlap syndrome ([facility] [address]    Interstitial lung disease ([facility] [address]  Resolved Problems:    * No resolved hospital problems. *    Patient is a 41-year-old female with a past medical history of COPD, chronic respiratory failure, coronary artery disease, hypertension, previous CVA and CHF who presented with worsening shortness of breath and hypoxia. Patient was found to be hypoxic in the low 80s on 4 L of oxygen on admission. There was some concern of possible CHF and patient was diuresed. She started on dexamethasone. In the hospital patient initially improved and was on her home oxygen but then decompensated. She was given Remdesevir and convalescent plasma with not much improvement    Acute on chronic hypoxic respiratory failure- now on vapotherm, FiO2 40%, and requirement worse  -Secondary to COVID-19  -Wean oxygen as tolerated. Uses 2 L of oxygen  -pro rayna ok   - CXR shows multifocal pneumonia  -pulm consulted    COVID-19 pneumonia  -Started on Decadron, and finished 8 days of low-dose Decadron, switch to high-dose Decadron, day 3  -Infectious disease consult  - remdesivir day 4  - convalescent plasma given 10/27      Possible bacterial pneumonia- finished course  -Procalcitonin elevated on admission, now improved . Treat as gram-negative/atypical day  -Continue antibiotics     COPR  -ct inhalers     Coronary artery disease with chest pain  -Continue home medications  -Troponin negative EKG shows no acute ST-T wave changes  -Patient states she is not a candidate for any kind of procedure.   Will add nitro as needed    Possible CHF-ruled out - echo shows nrl EF   -Patient had some pitting edema on admission with interstitial edema on CT  -Patient's baseline weight documented as about 193 pounds. Patient is below her dry weight at present    DM2  -Diet controlled. Last A1c 6.5  -Sliding scale insulin    HTN  - ct home meds    Anxiety  -Resume home meds    Chronic pain  -Continue home medication    Discussed with patient's POA/daughter. Patient's prognosis is very guarded. Family understands that if her oxygen requirement is worse comfort measures will be initiated. Daughter does state she does not want her mom to suffer. DVT Prophylaxis: lovenox  Diet: DIET LOW SODIUM 2 GM; 1500 ml  Dietary Nutrition Supplements: Low Volume Supplement  Code Status: Limited        Discharge plan -unclear, possibly needs hospice if she deteriorates on vapotherm      The patient and / or the family were informed of the results of any tests, a time was given to answer questions, a plan was proposed and they agreed with plan. Discussed with consulting physicians, nursing and social work     The note was completed using EMR. Every effort was made to ensure accuracy; however, inadvertent computerized transcription errors may be present.        Carole Mondragon MD

## 2020-10-31 NOTE — PROGRESS NOTES
Progress Note  Admit Date: 10/20/2020      PCP: Ayanna Clinton MD     CC: F/U for COVID    Days in hospital:  10    SUBJECTIVE / Interval Histo ry:  Patient feels bad today. Needing Vapotherm and nonrebreather. Patient states that she is tired of all of this and wants just to be comfortable and passed away peacefully. Allergies  Acetaminophen-codeine and Codeine    Medications    Scheduled Meds:   dexamethasone  20 mg Oral Daily    acetaminophen  650 mg Oral 3 times per day    carBAMazepine  300 mg Oral BID    polyethylene glycol  17 g Oral Daily    psyllium  1 packet Oral TID WC    sodium chloride flush  10 mL Intravenous 2 times per day    enoxaparin  30 mg Subcutaneous BID    aspirin  81 mg Oral Daily    atorvastatin  80 mg Oral Daily    levothyroxine  100 mcg Oral Daily    metoprolol tartrate  25 mg Oral BID    pantoprazole  20 mg Oral Daily    lisinopril  5 mg Oral Daily    amLODIPine  2.5 mg Oral Daily    isosorbide mononitrate  60 mg Oral Daily    montelukast  10 mg Oral Nightly    insulin lispro  0-12 Units Subcutaneous TID WC    insulin lispro  0-6 Units Subcutaneous Nightly    ranolazine  1,000 mg Oral BID    budesonide-formoterol  2 puff Inhalation BID    albuterol-ipratropium  1 puff Inhalation 4x Daily    pregabalin  50 mg Oral Nightly     Continuous Infusions:   dextrose         PRN Meds:  nitroGLYCERIN, sodium chloride, sodium chloride flush, acetaminophen **OR** acetaminophen, ALPRAZolam, perflutren lipid microspheres, ondansetron, guaiFENesin-dextromethorphan, albuterol sulfate HFA, oxyCODONE-acetaminophen, glucose, dextrose, glucagon (rDNA), dextrose    Vitals    BP (!) 142/60   Pulse 91   Temp 96.3 °F (35.7 °C) (Axillary)   Resp 20   Ht 5' 6\" (1.676 m)   Wt 186 lb 11.7 oz (84.7 kg)   SpO2 91%   BMI 30.14 kg/m²     Exam:    Gen: No distress. Eyes: PERRL. No sclera icterus. No conjunctival injection. ENT: No discharge. Pharynx clear.  External appearance of aggressive treatment. Wants to be comfortable. At this discussion with the patient and she understands that she will pass away once comfort meds were initiated. Patient states she is tired and just wants to close her right sleep and never wake up and just die peacefully. Discussed with family. Her daughter is on her way to say goodbye. Comfort meds to be initiated after family says the goodbyes    DVT Prophylaxis: lovenox  Diet: DIET LOW SODIUM 2 GM; 1500 ml  Dietary Nutrition Supplements: Low Volume Supplement  Code Status: Limited        Discharge plan -hospice consulted      The patient and / or the family were informed of the results of any tests, a time was given to answer questions, a plan was proposed and they agreed with plan. Discussed with consulting physicians, nursing and social work     The note was completed using EMR. Every effort was made to ensure accuracy; however, inadvertent computerized transcription errors may be present.        Em Sommer MD

## 2020-10-31 NOTE — PLAN OF CARE
Problem: Falls - Risk of:  Goal: Will remain free from falls  Description: Will remain free from falls  Outcome: Ongoing  Goal: Absence of physical injury  Description: Absence of physical injury  Outcome: Ongoing  Problem: Airway Clearance - Ineffective  Goal: Achieve or maintain patent airway  Outcome: Ongoing  Problem: Gas Exchange - Impaired  Goal: Absence of hypoxia  Outcome: Ongoing  Goal: Promote optimal lung function  Outcome: Ongoing  Problem: Breathing Pattern - Ineffective  Goal: Ability to achieve and maintain a regular respiratory rate  Outcome: Ongoing  Problem:  Body Temperature -  Risk of, Imbalanced  Goal: Ability to maintain a body temperature within defined limits  Outcome: Ongoing  Goal: Will regain or maintain usual level of consciousness  Outcome: Ongoing  Goal: Complications related to the disease process, condition or treatment will be avoided or minimized  Outcome: Ongoing  Problem: Isolation Precautions - Risk of Spread of Infection  Goal: Prevent transmission of infection  Outcome: Ongoing  Problem: Nutrition Deficits  Goal: Optimize nutrtional status  Outcome: Ongoing  Problem: Risk for Fluid Volume Deficit  Goal: Maintain normal heart rhythm  Outcome: Ongoing  Goal: Maintain absence of muscle cramping  Outcome: Ongoing  Goal: Maintain normal serum potassium, sodium, calcium, phosphorus, and pH  Outcome: Ongoing  Problem: Loneliness or Risk for Loneliness  Goal: Demonstrate positive use of time alone when socialization is not possible  Outcome: Ongoing  Problem: Fatigue  Goal: Verbalize increase energy and improved vitality  Outcome: Ongoing  Problem: Patient Education: Go to Patient Education Activity  Goal: Patient/Family Education  Outcome: Ongoing  Problem: OXYGENATION/RESPIRATORY FUNCTION  Goal: Patient will maintain patent airway  Outcome: Ongoing  Goal: Patient will achieve/maintain normal respiratory rate/effort  Description: Respiratory rate and effort will be within normal limits for the patient  Outcome: Ongoing  Problem: HEMODYNAMIC STATUS  Goal: Patient has stable vital signs and fluid balance  Outcome: Ongoing  Problem: FLUID AND ELECTROLYTE IMBALANCE  Goal: Fluid and electrolyte balance are achieved/maintained  Outcome: Ongoing  Problem: ACTIVITY INTOLERANCE/IMPAIRED MOBILITY  Goal: Mobility/activity is maintained at optimum level for patient  Outcome: Ongoing  Problem: Nutrition  Goal: Optimal nutrition therapy  Outcome: Ongoing  Problem: Pain:  Goal: Pain level will decrease  Description: Pain level will decrease  Outcome: Ongoing  Goal: Control of acute pain  Description: Control of acute pain  Outcome: Ongoing  Goal: Control of chronic pain  Description: Control of chronic pain  Outcome: Ongoing  Problem: Discharge Planning:  Goal: Discharged to appropriate level of care  Description: Discharged to appropriate level of care  Outcome: Ongoing  Problem: Skin Integrity:  Goal: Will show no infection signs and symptoms  Description: Will show no infection signs and symptoms  Outcome: Ongoing  Goal: Absence of new skin breakdown  Description: Absence of new skin breakdown  Outcome: Ongoing

## 2020-10-31 NOTE — PROGRESS NOTES
Patient told this nurse \"I just do not want to be in pain anymore. I want to close my eyes and be comfortable. \"  Physician notified. See new orders.

## 2020-10-31 NOTE — CARE COORDINATION
Discharge Planning:  SW received a call from AMBER Palencia. Butchmanish Darwin stated that she has reached out to pts dgtr and is waiting for a return call. Butchmanish Darwin stated that she can be reached at [phone] with any questions or concerns.   Electronically signed by Tai Ellington on 10/31/2020 at 4:04 PM

## 2020-10-31 NOTE — CARE COORDINATION
Hospice consult noted. [facility] unable to accept patient at this time. Have referred patient to [facility] ([phone]). Gave verbal referral to [facility]ina[facility]Tan and then faxed requested information ([phone]). Tan will reach out to family and return call to CM/SW office when appointment time has been established to meet with family.     Electronically signed by Thania Perales RN MSN on 10/31/2020 at 3:16 PM

## 2020-11-01 PROCEDURE — 2580000003 HC RX 258: Performed by: STUDENT IN AN ORGANIZED HEALTH CARE EDUCATION/TRAINING PROGRAM

## 2020-11-01 RX ADMIN — SODIUM CHLORIDE, PRESERVATIVE FREE 10 ML: 5 INJECTION INTRAVENOUS at 00:21

## 2020-11-01 NOTE — PROGRESS NOTES
Pt sleeping peacefully at beginning of shift. At [phone], received call from PCA that pt awake and crying out in pain. Went to get prn morphine and ativan. Received 2nd call that PCA was having trouble keeping patient in bed and rushed to room. Pt sitting up leaning into PCA, PCA speaking soothingly to patient. Gave Pt morphine and ativan and patient calmed down but did not go to sleep right away. Sat with patient and HR started to slow down and stopped. This nurse and Tara Morales RN could not feel a pulse or hear a heartbeat. Time of death .

## 2020-11-01 NOTE — PROGRESS NOTES
Notified daughter that patient has passed. Answered all of her questions and she was happy that I had stayed with her Mother. Daughter said she wanted her Mom to go to [place], [phone]. She said she had spoken to  home earlier in the day and would like us to notify the  home of patient death.

## 2020-11-01 NOTE — DISCHARGE SUMMARY
[facility]  Death/Discharge Summary    Name:Yoko Davila       :  1935              MRN:  [id number]    Admit date:  10/20/2020    Discharge /death date:  10/31/2020    Admitting Physician:  Tasneem Eng DO          History of Present Illness: Patient is a 67-year-old female with a past medical history of COPD, chronic respiratory failure, coronary artery disease, hypertension, previous CVA and CHF who presented with worsening shortness of breath and hypoxia. Patient was found to be hypoxic in the low 80s on 4 L of oxygen on admission. She was found to have COVID-19 pneumonia resulting in acute hypoxic respiratory failure. Patient was treated with Remdesevir steroid and convalescent plasma with minimal improvement. Patient also had superimposed strep throat for 1 respiratory pneumonia for which she finished her antibiotic course. Patient's condition continued to deteriorate and goals of care was addressed with her and she wanted to be comfortable did not want BiPAP or intubation or resuscitation. As her condition deteriorated comfort meds were initiated and patient passed away comfortably. Discharge Physical Exam:    Called by nurse that patient had become asystole on telemetry. Patient seen and examined. No palpable pulse. Pupils fixed and dilated. No cardiac or pulmonary activity. Patient pronounced dead.      Time of Death: 23.10 pm 10/31/2020    Cause of Death: COVID-19 pneumonia        [place]   [facility] [place] spent on Discharged > 30 minutes      Signed:  Avtar Sanders MD  2020, 10:01 AM

## 2020-11-09 ENCOUNTER — TELEPHONE (OUTPATIENT)
Dept: FAMILY MEDICINE CLINIC | Age: 85
End: 2020-11-09

## 2024-07-15 NOTE — TELEPHONE ENCOUNTER
Department of Anesthesiology  Preprocedure Note       Name:  Hugo Monroy   Age:  30 y.o.  :  1993                                          MRN:  [id number]         Date:  7/15/2024      Surgeon: * No surgeons listed *    Procedure: * No procedures listed *    Medications prior to admission:   Prior to Admission medications    Medication Sig Start Date End Date Taking? Authorizing Provider   omeprazole (PRILOSEC) 20 MG delayed release capsule Take 1 capsule by mouth 2 times daily (before meals) 24   Sonia Horne APRN - CNM   Misc. Devices KIT Nature's Blend Prenatal Multivitamin with Minerals ([facility] Baby Box)  NDC: [address]-84;  Take 1 softgel by mouth daily or as instructed by provider;  #qs for 6 months  Patient not taking: Reported on 3/5/2024 2/16/24   Meño Herndon MD   Progesterone 200 MG SUPP Place 1 each vaginally  Patient not taking: Reported on 2024    Sara Sethi MD   ASPIRIN 81 PO Take by mouth    Sara Sethi MD   progesterone (ENDOMETRIN) 100 MG suppository Place 1 suppository vaginally daily  Patient not taking: Reported on 2024 10/28/22   Margoth Madden PA-C   Prenatal MV-Min-Fe Fum-FA-DHA (PRENATAL 1 PO) Take by mouth    ProviderSara MD       Current medications:    Current Facility-Administered Medications   Medication Dose Route Frequency Provider Last Rate Last Admin   • terbutaline (BRETHINE) injection 0.25 mg  0.25 mg SubCUTAneous Once PRN Sonia Horne APRN - CNM       • lactated ringers IV soln infusion   IntraVENous Continuous Sonia Horne APRN - CNM       • lactated ringers bolus 500 mL  500 mL IntraVENous PRN Sonia Horne APRN - CNM        Or   • lactated ringers bolus 1,000 mL  1,000 mL IntraVENous PRN Sonia Horne APRN - CNM       • sodium chloride flush 0.9 % injection 5-40 mL  5-40 mL IntraVENous 2 times per day Sonia Horne APRN - CNM       • sodium chloride flush 0.9 % injection 5-40 mL  5-40 mL  I spoke to daughter Qi Farris. She uses this one or one half  And typically will last longer than a month. She has had pbs with anxiety and this has done well for her having been on it for some time.  Daughter aware of concerns with older people and falling and forgetfulness etc